# Patient Record
Sex: MALE | Race: WHITE | NOT HISPANIC OR LATINO | Employment: OTHER | URBAN - METROPOLITAN AREA
[De-identification: names, ages, dates, MRNs, and addresses within clinical notes are randomized per-mention and may not be internally consistent; named-entity substitution may affect disease eponyms.]

---

## 2021-01-01 ENCOUNTER — APPOINTMENT (OUTPATIENT)
Dept: RADIATION ONCOLOGY | Facility: MEDICAL CENTER | Age: 76
End: 2021-01-01
Payer: COMMERCIAL

## 2021-01-01 ENCOUNTER — HOSPITAL ENCOUNTER (OUTPATIENT)
Dept: RADIATION ONCOLOGY | Facility: MEDICAL CENTER | Age: 76
End: 2021-11-30
Attending: RADIOLOGY
Payer: COMMERCIAL

## 2021-01-01 ENCOUNTER — TELEPHONE (OUTPATIENT)
Dept: OPHTHALMOLOGY | Facility: MEDICAL CENTER | Age: 76
End: 2021-01-01

## 2021-01-01 ENCOUNTER — HOSPITAL ENCOUNTER (OUTPATIENT)
Dept: RADIOLOGY | Facility: MEDICAL CENTER | Age: 76
End: 2021-12-27
Attending: RADIOLOGY
Payer: COMMERCIAL

## 2021-01-01 ENCOUNTER — TELEPHONE (OUTPATIENT)
Dept: CARDIOLOGY | Facility: MEDICAL CENTER | Age: 76
End: 2021-01-01

## 2021-01-01 ENCOUNTER — APPOINTMENT (OUTPATIENT)
Dept: OPHTHALMOLOGY | Facility: MEDICAL CENTER | Age: 76
End: 2021-01-01
Payer: COMMERCIAL

## 2021-01-01 ENCOUNTER — TELEPHONE (OUTPATIENT)
Dept: RADIATION ONCOLOGY | Facility: MEDICAL CENTER | Age: 76
End: 2021-01-01

## 2021-01-01 VITALS
DIASTOLIC BLOOD PRESSURE: 69 MMHG | OXYGEN SATURATION: 99 % | BODY MASS INDEX: 22.65 KG/M2 | HEART RATE: 55 BPM | SYSTOLIC BLOOD PRESSURE: 133 MMHG | WEIGHT: 167 LBS | TEMPERATURE: 97.8 F

## 2021-01-01 VITALS
WEIGHT: 167.33 LBS | HEART RATE: 61 BPM | SYSTOLIC BLOOD PRESSURE: 116 MMHG | BODY MASS INDEX: 22.69 KG/M2 | DIASTOLIC BLOOD PRESSURE: 67 MMHG | OXYGEN SATURATION: 99 % | TEMPERATURE: 97.4 F

## 2021-01-01 DIAGNOSIS — C25.0 MALIGNANT NEOPLASM OF HEAD OF PANCREAS (HCC): ICD-10-CM

## 2021-01-01 LAB
CHEMOTHERAPY INFUSION START DATE: NORMAL
CHEMOTHERAPY INFUSION STOP DATE: NORMAL
CHEMOTHERAPY RECORDS: 2.1
CHEMOTHERAPY RECORDS: 5880
CHEMOTHERAPY RECORDS: NORMAL
CHEMOTHERAPY RX CANCER: NORMAL
DATE 1ST CHEMO CANCER: NORMAL
RAD ONC ARIA COURSE LAST TREATMENT DATE: NORMAL
RAD ONC ARIA COURSE TREATMENT ELAPSED DAYS: NORMAL
RAD ONC ARIA REFERENCE POINT DOSAGE GIVEN TO DATE: 39.9
RAD ONC ARIA REFERENCE POINT DOSAGE GIVEN TO DATE: 41.26
RAD ONC ARIA REFERENCE POINT DOSAGE GIVEN TO DATE: 42
RAD ONC ARIA REFERENCE POINT DOSAGE GIVEN TO DATE: 43.43
RAD ONC ARIA REFERENCE POINT DOSAGE GIVEN TO DATE: 44.1
RAD ONC ARIA REFERENCE POINT DOSAGE GIVEN TO DATE: 45.6
RAD ONC ARIA REFERENCE POINT DOSAGE GIVEN TO DATE: 46.2
RAD ONC ARIA REFERENCE POINT DOSAGE GIVEN TO DATE: 47.77
RAD ONC ARIA REFERENCE POINT DOSAGE GIVEN TO DATE: 48.3
RAD ONC ARIA REFERENCE POINT DOSAGE GIVEN TO DATE: 49.94
RAD ONC ARIA REFERENCE POINT DOSAGE GIVEN TO DATE: 50.4
RAD ONC ARIA REFERENCE POINT DOSAGE GIVEN TO DATE: 52.11
RAD ONC ARIA REFERENCE POINT DOSAGE GIVEN TO DATE: 52.5
RAD ONC ARIA REFERENCE POINT DOSAGE GIVEN TO DATE: 54.29
RAD ONC ARIA REFERENCE POINT DOSAGE GIVEN TO DATE: 54.6
RAD ONC ARIA REFERENCE POINT DOSAGE GIVEN TO DATE: 56.46
RAD ONC ARIA REFERENCE POINT DOSAGE GIVEN TO DATE: 56.7
RAD ONC ARIA REFERENCE POINT DOSAGE GIVEN TO DATE: 58.63
RAD ONC ARIA REFERENCE POINT DOSAGE GIVEN TO DATE: 58.8
RAD ONC ARIA REFERENCE POINT DOSAGE GIVEN TO DATE: 58.8
RAD ONC ARIA REFERENCE POINT DOSAGE GIVEN TO DATE: 60.8
RAD ONC ARIA REFERENCE POINT DOSAGE GIVEN TO DATE: 60.8
RAD ONC ARIA REFERENCE POINT ID: NORMAL
RAD ONC ARIA REFERENCE POINT SESSION DOSAGE GIVEN: 2.1
RAD ONC ARIA REFERENCE POINT SESSION DOSAGE GIVEN: 2.17

## 2021-01-01 PROCEDURE — 77336 RADIATION PHYSICS CONSULT: CPT | Performed by: RADIOLOGY

## 2021-01-01 PROCEDURE — 74178 CT ABD&PLV WO CNTR FLWD CNTR: CPT

## 2021-01-01 PROCEDURE — 77014 PR CT GUIDANCE PLACEMENT RAD THERAPY FIELDS: CPT | Mod: 26 | Performed by: RADIOLOGY

## 2021-01-01 PROCEDURE — 77386 HCHG IMRT DELIVERY COMPLEX: CPT | Performed by: RADIOLOGY

## 2021-01-01 PROCEDURE — 77427 RADIATION TX MANAGEMENT X5: CPT | Performed by: RADIOLOGY

## 2021-01-01 PROCEDURE — 71260 CT THORAX DX C+: CPT

## 2021-01-01 PROCEDURE — 700117 HCHG RX CONTRAST REV CODE 255: Performed by: RADIOLOGY

## 2021-01-01 PROCEDURE — 700111 HCHG RX REV CODE 636 W/ 250 OVERRIDE (IP): Performed by: RADIOLOGY

## 2021-01-01 RX ORDER — HEPARIN SODIUM (PORCINE) LOCK FLUSH IV SOLN 100 UNIT/ML 100 UNIT/ML
300-500 SOLUTION INTRAVENOUS PRN
Status: DISCONTINUED | OUTPATIENT
Start: 2021-01-01 | End: 2021-01-01 | Stop reason: HOSPADM

## 2021-01-01 RX ADMIN — IOHEXOL 100 ML: 350 INJECTION, SOLUTION INTRAVENOUS at 09:40

## 2021-01-01 RX ADMIN — HEPARIN 500 UNITS: 100 SYRINGE at 09:45

## 2021-01-01 ASSESSMENT — FIBROSIS 4 INDEX
FIB4 SCORE: 4.66
FIB4 SCORE: 4.66

## 2021-01-01 ASSESSMENT — PAIN SCALES - GENERAL
PAINLEVEL: NO PAIN
PAINLEVEL: NO PAIN

## 2021-03-15 ENCOUNTER — HOSPITAL ENCOUNTER (OUTPATIENT)
Dept: RADIOLOGY | Facility: MEDICAL CENTER | Age: 76
End: 2021-03-15
Payer: COMMERCIAL

## 2021-03-19 ENCOUNTER — PRE-ADMISSION TESTING (OUTPATIENT)
Dept: ADMISSIONS | Facility: MEDICAL CENTER | Age: 76
End: 2021-03-19
Attending: INTERNAL MEDICINE
Payer: COMMERCIAL

## 2021-03-19 DIAGNOSIS — Z01.812 PRE-OPERATIVE LABORATORY EXAMINATION: ICD-10-CM

## 2021-03-19 LAB
EST. AVERAGE GLUCOSE BLD GHB EST-MCNC: 217 MG/DL
HBA1C MFR BLD: 9.2 % (ref 4–5.6)

## 2021-03-19 PROCEDURE — C9803 HOPD COVID-19 SPEC COLLECT: HCPCS

## 2021-03-19 PROCEDURE — 36415 COLL VENOUS BLD VENIPUNCTURE: CPT

## 2021-03-19 PROCEDURE — U0003 INFECTIOUS AGENT DETECTION BY NUCLEIC ACID (DNA OR RNA); SEVERE ACUTE RESPIRATORY SYNDROME CORONAVIRUS 2 (SARS-COV-2) (CORONAVIRUS DISEASE [COVID-19]), AMPLIFIED PROBE TECHNIQUE, MAKING USE OF HIGH THROUGHPUT TECHNOLOGIES AS DESCRIBED BY CMS-2020-01-R: HCPCS

## 2021-03-19 PROCEDURE — U0005 INFEC AGEN DETEC AMPLI PROBE: HCPCS

## 2021-03-19 PROCEDURE — 83036 HEMOGLOBIN GLYCOSYLATED A1C: CPT

## 2021-03-19 RX ORDER — AMOXICILLIN AND CLAVULANATE POTASSIUM 875; 125 MG/1; MG/1
1 TABLET, FILM COATED ORAL 2 TIMES DAILY
Status: ON HOLD | COMMUNITY
End: 2021-08-29

## 2021-03-19 RX ORDER — ATORVASTATIN CALCIUM 10 MG/1
10 TABLET, FILM COATED ORAL NIGHTLY
COMMUNITY

## 2021-03-19 RX ORDER — GABAPENTIN 300 MG/1
300 CAPSULE ORAL 3 TIMES DAILY
COMMUNITY

## 2021-03-19 RX ORDER — VALSARTAN 160 MG/1
160 TABLET ORAL DAILY
Status: ON HOLD | COMMUNITY
End: 2022-01-01

## 2021-03-19 RX ORDER — TRAMADOL HYDROCHLORIDE 100 MG/1
100 TABLET, COATED ORAL
COMMUNITY
Start: 2021-03-09 | End: 2021-04-07

## 2021-03-19 RX ORDER — TAMSULOSIN HYDROCHLORIDE 0.4 MG/1
0.8 CAPSULE ORAL DAILY
COMMUNITY

## 2021-03-19 RX ORDER — SULFAMETHOXAZOLE AND TRIMETHOPRIM 800; 160 MG/1; MG/1
1 TABLET ORAL 2 TIMES DAILY
Status: ON HOLD | COMMUNITY
Start: 2021-02-02 | End: 2021-08-29

## 2021-03-19 RX ORDER — PANTOPRAZOLE SODIUM 40 MG/1
40 TABLET, DELAYED RELEASE ORAL
COMMUNITY
Start: 2021-03-09 | End: 2021-04-07

## 2021-03-19 RX ORDER — OXYCODONE HYDROCHLORIDE AND ACETAMINOPHEN 5; 325 MG/1; MG/1
2 TABLET ORAL EVERY 4 HOURS PRN
Status: ON HOLD | COMMUNITY
End: 2022-01-01

## 2021-03-19 NOTE — PREPROCEDURE INSTRUCTIONS
"Pre-admit appointment completed. \"Preparing for your Procedure\" sheet given to Pt with verbal and written instructions. Pt states all instructions given are understood and to call pre-admit or Dr's office for additional questions or any symptoms of illness/covid develop prior to DOS. Self isolation instructions for after the Covid test given to patient. Medications the patient will take the morning of surgery per anesthesia protocol: None and will take prescribed medications through the day before surgery.    Denies anesthesia complications    Had CBC, BMP, and EKG done at [facility] on 3/18/21      "

## 2021-03-20 LAB
SARS-COV-2 RNA RESP QL NAA+PROBE: NOTDETECTED
SPECIMEN SOURCE: NORMAL

## 2021-03-22 NOTE — PROGRESS NOTES
Subjective:   3/30/2021  6:32 AM  Primary care physician:Ric Delgadillo M.D.  Referring Provider: Mulu Faith DO  Medical Oncologist: Massimo De MD     Chief Complaint:   Chief Complaint   Patient presents with   • New Patient     NP PANCREATIC CA REF DR FAITH- EUS samantha for week before, Films from [facility]     Diagnosis:   1. Malignant neoplasm of head of pancreas (HCC)     2. Abnormal CT of the abdomen     3. Abdominal pain, unspecified abdominal location     4. Elevated CA 19-9 level         History of presenting illness:  Zeus Rodríguez  is a pleasant 75 y.o. year old male who presented with what appears to be adenocarcinoma in the head of the pancreas mostly focused on the uncinate and then tracking into the neck.  The patient states back in February he started developing lethargy and abdominal pain in early February.  He also started developing jaundice.  He was seen in the ER seen by medical oncology and had a CT scan.  I have personally reviewed that CT scan from [facility].  It does show a mass in the uncinate process extending up towards the neck.  It does narrow the portal vein SMV confluence as well as abutment on the common hepatic artery.  There is no sign of metastatic disease.  The patient was then sent for a PET scan which I have also personally reviewed from February 22, 2021 which showed some uptake in the pituitary gland but no mass identified.  There is no sign of metastatic disease and there is uptake in the mass in the head of the pancreas.  The patient did have a stent placed by gastroenterology.  He had an endoscopic ultrasound that identified the above findings as well.  The patient's bilirubin is now normalized.  He is not jaundiced.  He does complain of significant fatigue and decreased appetite.  He does have a family history of malignancies including a sister with breast cancer, father with cancer which she is unclear which one it was, and aunts and cousins with cancer.   He has not had any genetic counseling.  His daughter is here with him.  He is not jaundiced.  He states he was a heavy drinker when he was young but that stopped 20 years ago along with smoking.  He was never hospitalized for pancreatitis.  He does have dilated pancreatic duct at this time but he complains of minimal pain.  The patient is anxious to move forward with some type of therapy.  He feels he has been delayed dramatically and is anxious to start some type of therapy.  In light of the present findings, the patient has local advanced disease      Past Medical History:   Diagnosis Date   • Cancer (HCC) 2021    pancreatic   • Cataract     had surgery   • Dental disorder     upper dentures   • Diabetes (HCC)    • Heart burn    • Hemorrhagic disorder (HCC)     nose-bleeds   • High cholesterol    • Hypertension    • Indigestion    • Snoring    • Urinary bladder disorder      Past Surgical History:   Procedure Laterality Date   • PB UPPER GI ENDOSCOPY,DIAGNOSIS  3/24/2021    Procedure: GASTROSCOPY;  Surgeon: Peter Gaines M.D.;  Location: [facility];  Service: EUS   • EGD W/ENDOSCOPIC ULTRASOUND  3/24/2021    Procedure: EGD, WITH ENDOSCOPIC US;  Surgeon: Peter Gaines M.D.;  Location: [facility];  Service: EUS   • EGD WITH ASP/BX  3/24/2021    Procedure: EGD, WITH ASPIRATION BIOPSY;  Surgeon: Peter Gaines M.D.;  Location: [facility];  Service: EUS   • KNEE ARTHROPLASTY TOTAL Right 2018   • CATARACT PHACO WITH IOL Bilateral 2017   • OTHER  2017    L3,4,5 surgery   • CARPAL TUNNEL RELEASE Bilateral 2017   • OTHER  2016    cervical fusion     Allergies   Allergen Reactions   • Morphine Nausea     Outpatient Encounter Medications as of 3/30/2021   Medication Sig Dispense Refill   • ALLOPURINOL PO Take 500 mg by mouth.     • vitamin D (VITAMIND D3) 1000 UNIT Tab Take 1,000 Units by mouth every day.     • cyanocobalamin (VITAMIN B12) 1000 MCG Tab Take  by mouth  every day.     • metFORMIN (GLUCOPHAGE) 500 MG Tab TAKE ONE TABLET BY MOUTH ONCE A DAY WITH A MEAL FOR DIABETES     • oxyCODONE-acetaminophen (PERCOCET) 5-325 MG Tab TAKE 2 TABLETS BY MOUTH EVERY 4 HOURS AS NEEDED FOR PAIN     • pantoprazole (PROTONIX) 40 MG Tablet Delayed Response Take 40 mg by mouth.     • sulfamethoxazole-trimethoprim (BACTRIM DS) 800-160 MG tablet TAKE 1 TABLET BY MOUTH TWICE DAILY FOR 14 DAYS     • tamsulosin (FLOMAX) 0.4 MG capsule TAKE TWO CAPSULES BY MOUTH ONCE A DAY 30 MINUTES AFTER SAME MEAL EACH DAY; FOR PROSTATE ** DO NOT TAKE WITHIN 4 HOURS OF VIAGRA ** (DOSE INCREASE)     • traMADol HCl 100 MG Tab Take 100 mg by mouth.     • atorvastatin (LIPITOR) 10 MG Tab Take 10 mg by mouth every evening.     • gabapentin (NEURONTIN) 300 MG Cap Take 300 mg by mouth 3 times a day.     • valsartan (DIOVAN) 160 MG Tab Take 160 mg by mouth every day.     • FINASTERIDE PO Take  by mouth.     • NON SPECIFIED Vitamin B3     • amoxicillin-clavulanate (AUGMENTIN) 875-125 MG Tab Take 1 tablet by mouth 2 times a day.     • Acetaminophen (TYLENOL 8 HOUR PO) Take  by mouth.       No facility-administered encounter medications on file as of 3/30/2021.     Social History     Socioeconomic History   • Marital status:      Spouse name: Not on file   • Number of children: Not on file   • Years of education: Not on file   • Highest education level: Not on file   Occupational History   • Not on file   Tobacco Use   • Smoking status: Former Smoker   • Smokeless tobacco: Former User   • Tobacco comment: quit 30 years ago   Substance and Sexual Activity   • Alcohol use: Not Currently   • Drug use: Never   • Sexual activity: Not on file   Other Topics Concern   • Not on file   Social History Narrative   • Not on file     Social Determinants of Health     Financial Resource Strain:    • Difficulty of Paying Living Expenses:    Food Insecurity:    • Worried About Running Out of Food in the Last Year:    • Ran Out of  "Food in the Last Year:    Transportation Needs:    • Lack of Transportation (Medical):    • Lack of Transportation (Non-Medical):    Physical Activity:    • Days of Exercise per Week:    • Minutes of Exercise per Session:    Stress:    • Feeling of Stress :    Social Connections:    • Frequency of Communication with Friends and Family:    • Frequency of Social Gatherings with Friends and Family:    • Attends Buddhism Services:    • Active Member of Clubs or Organizations:    • Attends Club or Organization Meetings:    • Marital Status:    Intimate Partner Violence:    • Fear of Current or Ex-Partner:    • Emotionally Abused:    • Physically Abused:    • Sexually Abused:       Social History     Tobacco Use   Smoking Status Former Smoker   Smokeless Tobacco Former User   Tobacco Comment    quit 30 years ago     Social History     Substance and Sexual Activity   Alcohol Use Not Currently     Social History     Substance and Sexual Activity   Drug Use Never        No family history on file.    Review of Systems   Constitutional: Positive for chills, fever, malaise/fatigue and weight loss.   Eyes: Positive for blurred vision.   Gastrointestinal: Positive for abdominal pain, constipation, nausea and vomiting.   Skin: Positive for itching and rash.   Neurological: Positive for dizziness and weakness.   All other systems reviewed and are negative.       Objective:   /74 (BP Location: Right arm, Patient Position: Sitting, BP Cuff Size: Adult)   Pulse (!) 103   Temp (!) 35.6 °C (96.1 °F) (Temporal)   Ht 1.803 m (5' 11\")   Wt 91.2 kg (201 lb)   SpO2 92%   BMI 28.03 kg/m²     Physical Exam    Labs: 2/17/21    Ca 19-9 <38 U/ml 571High      WBC Count 3.7 - 10.6 x10^3/ul 13.4High     RBC Count 4.50 - 5.70 x10^6/ul 3.15Low     HGB 13.0 - 16.7 g/dl 10.9Low     Hct 38.8 - 49.7 % 31.8Low     MCV 83.0 - 99.0 fl 100.8High     MCH 28.0 - 33.8 pg 34.6High     MCHC 33.1 - 36.5 g/dL 34.3    RDW 11.8 - 14.0 % 14.7High   "   Platelet Count 146 - 390 x10^3/uL 205    MPV 6.4 - 10.2 fl 10.1      Sodium 136 - 144 mmol/L 135Low     POTASSIUM 3.6 - 5.1 mmol/L 3.7    Chloride 101 - 111 mmol/L 104    CARBON DIOXIDE 22 - 32 mmol/L 22    Anion Gap 2 - 11 mmol/L 9    Calcium, S/P 8.7 - 10.3 mg/dl 7.7Low     Glucose, Fasting 60 - 99 mg/dl 178High     BUN 8 - 20 mg/dl 10    Creatinine 0.80 - 1.40 mg/dl 0.71Low     Protein, Total 6.4 - 8.2 g/dl 5.1Low     Albumin 3.5 - 4.8 g/dl 2.2Low     ALKALINE PHOSPHATASE 38 - 126 IU/L 172High     ALT 17 - 63 IU/L 56    AST 15 - 41 IU/L 89High     Bilirubin, Total 0.4 - 2.0 mg/dl 1.9    BILIRUBIN, DIRECT 0.0 - 0.3 mg/dl 0.9High     Indirect Bilirubin 0.0 - 1.5 mg/dl 1.0    Globulin 2.6 - 3.1 g/dl 2.9    Albumin/Globulin Ratio 1.00 - 2.20 Ratio 0.80Low     EGFRE Calc >60 ml/min/1.7 108            Imaging: MRI 3/7/21  Per my read,         3/7/21 CT       2/22/21PET         Pathology: 3/8/21     DIAGNOSIS:   A.  Bile duct brushings (cytology):   POSITIVE FOR MALIGNANCY:  Adenocarcinoma present   B.  Pancreatic duct brushings (cytology):   POSITIVE FOR MALIGNANCY:   Adenocarcinoma present     Procedures: 3/24/21    1.  Esophagogastroduodenoscopy with removal of foreign body/pancreatic stent.  2.  Endoscopic ultrasound, upper.    3/8/21  ERCP      Diagnosis:     1. Malignant neoplasm of head of pancreas (HCC)     2. Abnormal CT of the abdomen     3. Abdominal pain, unspecified abdominal location     4. Elevated CA 19-9 level             Medical Decision Making:  Today's Assessment / Status / Plan:     In light of the present findings, the patient suffers from local advanced disease.  There is narrowing there is also involvement of the of the SMV portal vein junction.  Common hepatic artery.  There is no sign of metastatic disease thus the patient should go to neoadjuvant chemotherapy, full cycles of 6-8 followed by repeat imaging.  I had a long discussion with him about the game plan and he has agreed to along with  his daughter.  The patient would benefit from a PowerPort thus we will schedule him for PowerPort.  We discussed the risks and benefits of the PowerPort including bleeding, infection, the possibility of upper extremity thrombosis and not being able to feed the catheter.  He is left-handed but we will approach from the right side.  I have also sent a referral to cancer care specialist as well as Dr. Hadley since the patient is asking for second opinion.  I did stress to him that it would be nice to have his chemotherapy in [place] since it is close to him and that I work very closely with Dr. Jomar Tran and Dr. De.    Thank you  I, Dr. Quintanilla have entered, reviewed and confirmed the above diagnoses related to this patient on this date of service, 3/30/2021  6:32 AM.    He agreed and verbalized his agreement and understanding with the current plan. I answered all questions and concerns he has at this time and advised him to call at any time in the interim with questions or concerns in regards to his care.    Thank you for allowing me to participate in his care, I will continue to follow closely.       Please note that this dictation was created using voice recognition software. I have made every reasonable attempt to correct obvious errors, but I expect that there are errors of grammar and possibly content that I did not discover before finalizing the note.     Thank you for this consultation and allowing me to participate in your patient's care. If I can be of further service please contact my office.

## 2021-03-24 ENCOUNTER — ANESTHESIA (OUTPATIENT)
Dept: SURGERY | Facility: MEDICAL CENTER | Age: 76
End: 2021-03-24
Payer: COMMERCIAL

## 2021-03-24 ENCOUNTER — ANESTHESIA EVENT (OUTPATIENT)
Dept: SURGERY | Facility: MEDICAL CENTER | Age: 76
End: 2021-03-24
Payer: COMMERCIAL

## 2021-03-24 ENCOUNTER — HOSPITAL ENCOUNTER (OUTPATIENT)
Facility: MEDICAL CENTER | Age: 76
End: 2021-03-24
Attending: INTERNAL MEDICINE | Admitting: INTERNAL MEDICINE
Payer: COMMERCIAL

## 2021-03-24 VITALS
HEART RATE: 80 BPM | TEMPERATURE: 97 F | RESPIRATION RATE: 16 BRPM | DIASTOLIC BLOOD PRESSURE: 57 MMHG | BODY MASS INDEX: 29.81 KG/M2 | OXYGEN SATURATION: 95 % | WEIGHT: 212.96 LBS | SYSTOLIC BLOOD PRESSURE: 116 MMHG | HEIGHT: 71 IN

## 2021-03-24 LAB — GLUCOSE BLD-MCNC: 191 MG/DL (ref 65–99)

## 2021-03-24 PROCEDURE — 502240 HCHG MISC OR SUPPLY RC 0272: Performed by: INTERNAL MEDICINE

## 2021-03-24 PROCEDURE — 160025 RECOVERY II MINUTES (STATS): Performed by: INTERNAL MEDICINE

## 2021-03-24 PROCEDURE — 700101 HCHG RX REV CODE 250: Performed by: INTERNAL MEDICINE

## 2021-03-24 PROCEDURE — 160046 HCHG PACU - 1ST 60 MINS PHASE II: Performed by: INTERNAL MEDICINE

## 2021-03-24 PROCEDURE — 160048 HCHG OR STATISTICAL LEVEL 1-5: Performed by: INTERNAL MEDICINE

## 2021-03-24 PROCEDURE — 160002 HCHG RECOVERY MINUTES (STAT): Performed by: INTERNAL MEDICINE

## 2021-03-24 PROCEDURE — 160203 HCHG ENDO MINUTES - 1ST 30 MINS LEVEL 4: Performed by: INTERNAL MEDICINE

## 2021-03-24 PROCEDURE — 700105 HCHG RX REV CODE 258: Performed by: INTERNAL MEDICINE

## 2021-03-24 PROCEDURE — 82962 GLUCOSE BLOOD TEST: CPT

## 2021-03-24 PROCEDURE — 160035 HCHG PACU - 1ST 60 MINS PHASE I: Performed by: INTERNAL MEDICINE

## 2021-03-24 PROCEDURE — 160009 HCHG ANES TIME/MIN: Performed by: INTERNAL MEDICINE

## 2021-03-24 RX ORDER — DIPHENHYDRAMINE HYDROCHLORIDE 50 MG/ML
12.5 INJECTION INTRAMUSCULAR; INTRAVENOUS
Status: DISCONTINUED | OUTPATIENT
Start: 2021-03-24 | End: 2021-03-24 | Stop reason: HOSPADM

## 2021-03-24 RX ORDER — SODIUM CHLORIDE, SODIUM LACTATE, POTASSIUM CHLORIDE, CALCIUM CHLORIDE 600; 310; 30; 20 MG/100ML; MG/100ML; MG/100ML; MG/100ML
INJECTION, SOLUTION INTRAVENOUS CONTINUOUS
Status: DISCONTINUED | OUTPATIENT
Start: 2021-03-24 | End: 2021-03-24 | Stop reason: HOSPADM

## 2021-03-24 RX ORDER — ONDANSETRON 2 MG/ML
4 INJECTION INTRAMUSCULAR; INTRAVENOUS
Status: DISCONTINUED | OUTPATIENT
Start: 2021-03-24 | End: 2021-03-24 | Stop reason: HOSPADM

## 2021-03-24 RX ORDER — HALOPERIDOL 5 MG/ML
1 INJECTION INTRAMUSCULAR
Status: DISCONTINUED | OUTPATIENT
Start: 2021-03-24 | End: 2021-03-24 | Stop reason: HOSPADM

## 2021-03-24 RX ADMIN — SODIUM CHLORIDE, POTASSIUM CHLORIDE, SODIUM LACTATE AND CALCIUM CHLORIDE: 600; 310; 30; 20 INJECTION, SOLUTION INTRAVENOUS at 06:50

## 2021-03-24 RX ADMIN — WATER 15 ML: 100 IRRIGANT IRRIGATION at 06:49

## 2021-03-24 RX ADMIN — SODIUM CHLORIDE, POTASSIUM CHLORIDE, SODIUM LACTATE AND CALCIUM CHLORIDE: 600; 310; 30; 20 INJECTION, SOLUTION INTRAVENOUS at 08:00

## 2021-03-24 RX ADMIN — LIDOCAINE HYDROCHLORIDE 0.5 ML: 10 INJECTION, SOLUTION INFILTRATION; PERINEURAL at 06:50

## 2021-03-24 NOTE — ANESTHESIA PREPROCEDURE EVALUATION
Relevant Problems   No relevant active problems       Physical Exam    Airway   Mallampati: II  TM distance: >3 FB  Neck ROM: full       Cardiovascular - normal exam  Rhythm: regular  Rate: normal  (-) murmur     Dental - normal exam           Pulmonary - normal exam  Breath sounds clear to auscultation     Abdominal    Neurological - normal exam                 Anesthesia Plan    ASA 3   ASA physical status 3 criteria: other (comment)    Plan - general       Airway plan will be natural airway    (Metastatic cancer)      Induction: intravenous    Postoperative Plan: Postoperative administration of opioids is intended.    Pertinent diagnostic labs and testing reviewed    Informed Consent:    Anesthetic plan and risks discussed with patient.    Use of blood products discussed with: patient whom consented to blood products.

## 2021-03-24 NOTE — ANESTHESIA POSTPROCEDURE EVALUATION
Patient: Zeus Rodríguez    Procedure Summary     Date: 03/24/21 Room / Location:  ENDOSCOPIC ULTRASOUND ROOM / SURGERY [facility]    Anesthesia Start: 0800 Anesthesia Stop: 0857    Procedures:       GASTROSCOPY      EGD, WITH ENDOSCOPIC US      EGD, WITH ASPIRATION BIOPSY Diagnosis:       Mass of pancreas      (MASS OF PANCREAS)    Surgeons: Peter Gaines M.D. Responsible Provider: Yakov Lara M.D.    Anesthesia Type: general ASA Status: 2          Final Anesthesia Type: general  Last vitals  BP   Blood Pressure : 112/67    Temp   37.1 °C (98.8 °F)    Pulse   77   Resp   14    SpO2   100 %      Anesthesia Post Evaluation    Patient location during evaluation: PACU  Patient participation: complete - patient participated  Level of consciousness: awake and alert    Airway patency: patent  Anesthetic complications: no  Cardiovascular status: hemodynamically stable  Respiratory status: acceptable  Hydration status: euvolemic    PONV: none          There were no known complications for this encounter.     Nurse Pain Score: 0 (NPRS)

## 2021-03-24 NOTE — OR NURSING
0832: To PACU post EGD/EUS. EGD bite block in place.  0841: Bite block dc'd, breathing is spontaneous and unlabored.  0900: Pt on room air. No pain or nausea.  0925: Meets criteria for stage ll.

## 2021-03-24 NOTE — ANESTHESIA TIME REPORT
Anesthesia Start and Stop Event Times     Date Time Event    3/24/2021 0800 Anesthesia Start     0811 Ready for Procedure     0850 Anesthesia Stop        Responsible Staff  03/24/21    Name Role Begin End    Yakov Lara M.D. Anesth 0800 0850        Preop Diagnosis (Free Text):  Pre-op Diagnosis     MASS OF PANCREAS, OBSTRUCTIVE HYPERBILIRUBINEMIA        Preop Diagnosis (Codes):  Diagnosis Information     Diagnosis Code(s): Mass of pancreas [K86.89]        Post op Diagnosis  Pancreatic cancer (HCC)      Premium Reason  Non-Premium    Comments:

## 2021-03-24 NOTE — DISCHARGE INSTRUCTIONS
ENDOSCOPY HOME CARE INSTRUCTION    GASTROSCOPY OR ERCP  1. Don't eat or drink anything for about an hour after the test. You can then resume your regular diet.  2. Don't drive or drink alcohol for 24 hours. The medication you received will make you too drowsy.    4. If you begin to vomit bloody material, or develop black or bloody stools, call your doctor as soon as possible.  5. If you have any neck, chest, abdominal pain or temp of 100 degrees, call your doctor.  6. See your doctor ***  7. Additional instructions: ***  8. Prescriptions: ***  Resume pre-procedural diet    Dr. Gaines  [facility]  [address]  [phone]     EGD with:        Pancreas stent / foreign body removal     EUS upper     Indication:        Pancreas cancer staging     Sedation:         LAZ (Marco)     Findings:              EGD                          Esophagus                                      Unremarkable                          Stomach                                      Hiatal hernia - 42-44cm                                      Large partially digested gastric pool                                      Grossly unremarkable mucosa                          Duodenum                                      Unremarkable mucosa                                      Biliary and pancreatic stents in place                                      Pancreatic stent removed with rat-tooth forcep                 EUS                          Celiac axis                                      No adenopathy                                      Clear plane between tumor and celiac and superior mesenteric arteries                          Pancreas body/tail                                      Atrophic, heterogeneous, hypoechoic                          Pancreatic duct                                      Dilated and tortuous to tail of pancreas                          Ampulla                                      Bile duct stent in place                           CBD                                      Encased by tumor                          Head of pancreas tumor                                      29.0mm x 25.6mm x 27.3mm                                      Hypoechoic, heterogeneous                                      Abutting portal vein with loss of interface but no invasion into lumen    Plan:                Follow up with Dr. Quintanilla                          Favor neoadjuvant therapy prior to surgery        3/24/2021 8:31 AM Peter Gaines M.D.    You should call 911 if you develop problems with breathing or chest pain.  If any questions arise, call your doctor. If your doctor is not available, please feel free to call [phone]. You can also call the HEALTH HOTLINE open 24 hours/day, 7 days/week and speak to a nurse at [phone], or toll free [phone].    Depression / Suicide Risk    As you are discharged from this [facility], it is important to learn how to keep safe from harming yourself.    Recognize the warning signs:  · Abrupt changes in personality, positive or negative- including increase in energy   · Giving away possessions  · Change in eating patterns- significant weight changes-  positive or negative  · Change in sleeping patterns- unable to sleep or sleeping all the time   · Unwillingness or inability to communicate  · Depression  · Unusual sadness, discouragement and loneliness  · Talk of wanting to die  · Neglect of personal appearance   · Rebelliousness- reckless behavior  · Withdrawal from people/activities they love  · Confusion- inability to concentrate     If you or a loved one observes any of these behaviors or has concerns about self-harm, here's what you can do:  · Talk about it- your feelings and reasons for harming yourself  · Remove any means that you might use to hurt yourself (examples: pills, rope, extension cords, firearm)  · Get professional help from the community (Mental Health,  Substance Abuse, psychological counseling)  · Do not be alone:Call your Safe Contact- someone whom you trust who will be there for you.  · Call your local CRISIS HOTLINE 889-4842 or 531-188-1263  · Call your local Children's Mobile Crisis Response Team [place] (423) 067-1568 or www.[employer]  · Call the toll free National Suicide Prevention Hotlines   · National Suicide Prevention Lifeline 370-180-UWAQ (9097)  · National Hope Line Network 800-SUICIDE (727-0829)    I acknowledge receipt and understanding of these Home Care Instructions.

## 2021-03-24 NOTE — OR SURGEON
Immediate Post OP Note    PreOp Diagnosis: Pancreatic cancer    PostOp Diagnosis: Same    Procedure(s):  GASTROSCOPY - Wound Class: Clean Contaminated  EGD, WITH ENDOSCOPIC US - Wound Class: Clean Contaminated  EGD, WITH ASPIRATION BIOPSY - Wound Class: Clean Contaminated    Surgeon(s):  Peter Gaines M.D.    Anesthesiologist/Type of Anesthesia:  Anesthesiologist: Yakov Lara M.D./* No anesthesia type entered *    Surgical Staff:  Circulator: Gerardo Larios R.N.  Endoscopy Technician: Sheryl York; Vik Mitchell    Specimens removed if any:  * No specimens in log *    Dr. Gaines  [facility]  [place] [address]  [phone]    EGD with: Pancreas stent / foreign body removal    EUS upper    Indication: Pancreas cancer staging    Sedation: MAC (Marco)    Findings:   EGD    Esophagus     Unremarkable    Stomach     Hiatal hernia - 42-44cm     Large partially digested gastric pool     Grossly unremarkable mucosa    Duodenum     Unremarkable mucosa     Biliary and pancreatic stents in place     Pancreatic stent removed with rat-tooth forcep     EUS    Celiac axis     No adenopathy     Clear plane between tumor and celiac and superior mesenteric arteries    Pancreas body/tail     Atrophic, heterogeneous, hypoechoic    Pancreatic duct     Dilated and tortuous to tail of pancreas    Ampulla     Bile duct stent in place    CBD     Encased by tumor    Head of pancreas tumor     29.0mm x 25.6mm x 27.3mm     Hypoechoic, heterogeneous     Abutting portal vein with loss of interface but no invasion into lumen    Plan:  Follow up with Dr. Quintanilla    Favor neoadjuvant therapy prior to surgery      3/24/2021 8:31 AM Peter Gaines M.D.

## 2021-03-24 NOTE — PROCEDURES
DATE OF PROCEDURE:  03/24/2021     PROCEDURES:  1.  Esophagogastroduodenoscopy with removal of foreign body/pancreatic stent.  2.  Endoscopic ultrasound, upper.     INDICATION:  Pancreatic cancer staging.     FINAL IMPRESSION:  EGD FINDINGS:  1.  Esophagus, unremarkable mucosa.  2.  A 2 cm hiatal hernia.  3.  Large partially digested gastric pool limiting visualization.  4.  Grossly unremarkable gastric mucosa.  5.  Duodenum, unremarkable mucosa throughout.  6.  Biliary and pancreatic stents in place.  7.  Pancreatic stent removed with rat tooth forceps.     ENDOSCOPIC ULTRASOUND FINDINGS:  1.  Celiac axis, no adenopathy.  2.  Clear plane seen between pancreatic tumor and celiac and superior   mesenteric arteries.  3.  Pancreatic body and tail, atrophic heterogeneous and hypoechoic.  4.  Pancreatic duct dilated and tortuous through to the tail.  5.  Biliary ampulla bile duct stent in place.  6.  Common bile duct stent in place.  Encased by tumor in the head of the   pancreas.  7.  Head of pancreas tumor 29.0 mm x 25.6 mm x 27.3 mm.  Hypoechoic   heterogeneous.  Abutting portal vein with loss of interface, but no invasion   into the lumen.     RECOMMENDATIONS:  1.  Follow up with Dr. Quintanilla.  2.  Favor neoadjuvant therapy prior to surgery.     DESCRIPTION OF PROCEDURE:  Prior to the procedure, physical exam was stable.    During procedure, vital signs remained within normal limits.  Prior to   sedation, informed consent was obtained.  Risks, benefits, alternatives   including but not limited to risk of bleeding, infection, perforation, adverse   reaction to medication, failure to identify pathology, pancreatitis and death   explained to the patient and his daughter who accepted all risks.  The   patient was prepped in the left lateral position after sedation provided by   anesthesia in the form of propofol.     EGD:  Scope tip of the Olympus flexible gastroscope passed in the proximal   esophagus.  Proximal  middle and distal thirds of the esophagus were well   visualized and were unremarkable.  Stomach was entered.  There was a hiatal   hernia from 42-44 cm.  Air was insufflated.  There was a large partially   digested gastric pool, which had minimal liquid component and could not be   suctioned.  Air was insufflated, scope retroflexed to view the body, fundus   and cardia, then straightened to view the antrum and incisura.  The mucosa was   grossly unremarkable, but could not be fully assessed given the food in the   stomach.  The pylorus was patent.  Duodenal bulb sweep second and third   portion demonstrated unremarkable parenchyma.  The biliary ampulla had a   biliary and pancreatic stent in place.  A rat tooth forceps was passed down   the scope channel and the pancreatic stent was selectively removed through the   scope channel leaving the biliary stent in place.  Once this was complete,   the scope was withdrawn.  Air was suctioned from the stomach and we proceeded   with endoscopic ultrasound.     EUS:  The flexible radial echoendoscope passed in the proximal stomach.    Imaging of the celiac axis showed no adenopathy.  There was a clear plane seen   between tumor and celiac and superior mesenteric arteries arguing against   invasion.  The pancreatic body and tail itself was heterogeneous hypoechoic   and atrophic consistent with downstream tumor.  The pancreatic duct was   tortuous and dilated through to the tail of the pancreas.  Scope was advanced   into the duodenum.  The biliary ampulla was obscured by the bile duct stent.    The common bile duct was followed and was seen to be encased by tumor.  In the   head of the pancreas, there was a 29.0 x 27.3 x 25.6 mm heterogeneous   hypoechoic tumor consistent with the patient's known adenocarcinoma.  This was   followed and the portal vein was identified.  There was a loss of interface   between the tumor and the portal vein suggestive of tumor adherence at  least,   if not ingrowth.  There was no invasion into the lumen.  Multiple passes with   the scope were made in a similar fashion with the same findings.  Procedure   was deemed complete at this time.  Scope was withdrawn.  Air and liquid were   suctioned.  The patient tolerated the procedure well and was sent to recovery   without immediate complications.        ______________________________  MD MELANIE CARRERA/JEREMIAH/LEO    DD:  03/24/2021 08:50  DT:  03/24/2021 10:13    Job#:  [id number]

## 2021-03-30 ENCOUNTER — OFFICE VISIT (OUTPATIENT)
Dept: SURGICAL ONCOLOGY | Facility: MEDICAL CENTER | Age: 76
End: 2021-03-30
Payer: COMMERCIAL

## 2021-03-30 VITALS
BODY MASS INDEX: 28.14 KG/M2 | DIASTOLIC BLOOD PRESSURE: 74 MMHG | HEART RATE: 103 BPM | OXYGEN SATURATION: 92 % | WEIGHT: 201 LBS | SYSTOLIC BLOOD PRESSURE: 128 MMHG | TEMPERATURE: 96.1 F | HEIGHT: 71 IN

## 2021-03-30 DIAGNOSIS — C25.0 MALIGNANT NEOPLASM OF HEAD OF PANCREAS (HCC): ICD-10-CM

## 2021-03-30 DIAGNOSIS — R97.8 ELEVATED CA 19-9 LEVEL: ICD-10-CM

## 2021-03-30 DIAGNOSIS — R93.5 ABNORMAL CT OF THE ABDOMEN: ICD-10-CM

## 2021-03-30 DIAGNOSIS — R10.9 ABDOMINAL PAIN, UNSPECIFIED ABDOMINAL LOCATION: ICD-10-CM

## 2021-03-30 PROCEDURE — 99205 OFFICE O/P NEW HI 60 MIN: CPT | Performed by: SURGERY

## 2021-03-30 ASSESSMENT — ENCOUNTER SYMPTOMS
WEAKNESS: 1
WEIGHT LOSS: 1
FEVER: 1
DIZZINESS: 1
ABDOMINAL PAIN: 1
BLURRED VISION: 1
NAUSEA: 1
CONSTIPATION: 1
CHILLS: 1
VOMITING: 1

## 2021-03-30 NOTE — PATIENT INSTRUCTIONS
The patient be scheduled for [employer] and also being referred to cancer care specialist but also I have put a message out to Dr. Hadley in [place] since the patient prefers another opinion.

## 2021-03-31 ENCOUNTER — HOSPITAL ENCOUNTER (OUTPATIENT)
Facility: MEDICAL CENTER | Age: 76
End: 2021-03-31
Attending: SURGERY | Admitting: SURGERY
Payer: COMMERCIAL

## 2021-04-02 ENCOUNTER — PRE-ADMISSION TESTING (OUTPATIENT)
Dept: ADMISSIONS | Facility: MEDICAL CENTER | Age: 76
End: 2021-04-02
Attending: SURGERY
Payer: COMMERCIAL

## 2021-04-02 DIAGNOSIS — Z01.812 PRE-OPERATIVE LABORATORY EXAMINATION: ICD-10-CM

## 2021-04-02 LAB
COVID ORDER STATUS COVID19: NORMAL
SARS-COV-2 RNA RESP QL NAA+PROBE: NOTDETECTED
SPECIMEN SOURCE: NORMAL

## 2021-04-02 PROCEDURE — U0003 INFECTIOUS AGENT DETECTION BY NUCLEIC ACID (DNA OR RNA); SEVERE ACUTE RESPIRATORY SYNDROME CORONAVIRUS 2 (SARS-COV-2) (CORONAVIRUS DISEASE [COVID-19]), AMPLIFIED PROBE TECHNIQUE, MAKING USE OF HIGH THROUGHPUT TECHNOLOGIES AS DESCRIBED BY CMS-2020-01-R: HCPCS

## 2021-04-02 PROCEDURE — C9803 HOPD COVID-19 SPEC COLLECT: HCPCS

## 2021-04-02 PROCEDURE — U0005 INFEC AGEN DETEC AMPLI PROBE: HCPCS

## 2021-04-07 ENCOUNTER — HOSPITAL ENCOUNTER (OUTPATIENT)
Facility: MEDICAL CENTER | Age: 76
End: 2021-04-09
Attending: EMERGENCY MEDICINE | Admitting: STUDENT IN AN ORGANIZED HEALTH CARE EDUCATION/TRAINING PROGRAM
Payer: COMMERCIAL

## 2021-04-07 DIAGNOSIS — R17 JAUNDICE: ICD-10-CM

## 2021-04-07 PROBLEM — E11.9 DM (DIABETES MELLITUS) (HCC): Status: ACTIVE | Noted: 2021-04-07

## 2021-04-07 LAB
ALBUMIN SERPL BCP-MCNC: 3.2 G/DL (ref 3.2–4.9)
ALBUMIN/GLOB SERPL: 0.9 G/DL
ALP SERPL-CCNC: 431 U/L (ref 30–99)
ALT SERPL-CCNC: 69 U/L (ref 2–50)
ANION GAP SERPL CALC-SCNC: 11 MMOL/L (ref 7–16)
AST SERPL-CCNC: 129 U/L (ref 12–45)
BASOPHILS # BLD AUTO: 0.6 % (ref 0–1.8)
BASOPHILS # BLD: 0.04 K/UL (ref 0–0.12)
BILIRUB SERPL-MCNC: 6.3 MG/DL (ref 0.1–1.5)
BUN SERPL-MCNC: 7 MG/DL (ref 8–22)
CALCIUM SERPL-MCNC: 9 MG/DL (ref 8.5–10.5)
CHLORIDE SERPL-SCNC: 99 MMOL/L (ref 96–112)
CO2 SERPL-SCNC: 21 MMOL/L (ref 20–33)
CREAT SERPL-MCNC: 0.3 MG/DL (ref 0.5–1.4)
EOSINOPHIL # BLD AUTO: 0.07 K/UL (ref 0–0.51)
EOSINOPHIL NFR BLD: 1.1 % (ref 0–6.9)
ERYTHROCYTE [DISTWIDTH] IN BLOOD BY AUTOMATED COUNT: 54 FL (ref 35.9–50)
GLOBULIN SER CALC-MCNC: 3.5 G/DL (ref 1.9–3.5)
GLUCOSE SERPL-MCNC: 224 MG/DL (ref 65–99)
HCT VFR BLD AUTO: 36.2 % (ref 42–52)
HGB BLD-MCNC: 12.2 G/DL (ref 14–18)
IMM GRANULOCYTES # BLD AUTO: 0.08 K/UL (ref 0–0.11)
IMM GRANULOCYTES NFR BLD AUTO: 1.2 % (ref 0–0.9)
LYMPHOCYTES # BLD AUTO: 2.07 K/UL (ref 1–4.8)
LYMPHOCYTES NFR BLD: 32 % (ref 22–41)
MCH RBC QN AUTO: 33.3 PG (ref 27–33)
MCHC RBC AUTO-ENTMCNC: 33.7 G/DL (ref 33.7–35.3)
MCV RBC AUTO: 98.9 FL (ref 81.4–97.8)
MONOCYTES # BLD AUTO: 0.63 K/UL (ref 0–0.85)
MONOCYTES NFR BLD AUTO: 9.7 % (ref 0–13.4)
NEUTROPHILS # BLD AUTO: 3.58 K/UL (ref 1.82–7.42)
NEUTROPHILS NFR BLD: 55.4 % (ref 44–72)
NRBC # BLD AUTO: 0 K/UL
NRBC BLD-RTO: 0 /100 WBC
PLATELET # BLD AUTO: 256 K/UL (ref 164–446)
PMV BLD AUTO: 12.2 FL (ref 9–12.9)
POTASSIUM SERPL-SCNC: 3.7 MMOL/L (ref 3.6–5.5)
PROT SERPL-MCNC: 6.7 G/DL (ref 6–8.2)
RBC # BLD AUTO: 3.66 M/UL (ref 4.7–6.1)
SODIUM SERPL-SCNC: 131 MMOL/L (ref 135–145)
WBC # BLD AUTO: 6.5 K/UL (ref 4.8–10.8)

## 2021-04-07 PROCEDURE — G0378 HOSPITAL OBSERVATION PER HR: HCPCS

## 2021-04-07 PROCEDURE — 85025 COMPLETE CBC W/AUTO DIFF WBC: CPT

## 2021-04-07 PROCEDURE — 36415 COLL VENOUS BLD VENIPUNCTURE: CPT

## 2021-04-07 PROCEDURE — 80053 COMPREHEN METABOLIC PANEL: CPT

## 2021-04-07 PROCEDURE — 99285 EMERGENCY DEPT VISIT HI MDM: CPT

## 2021-04-07 PROCEDURE — 94760 N-INVAS EAR/PLS OXIMETRY 1: CPT

## 2021-04-07 PROCEDURE — 99219 PR INITIAL OBSERVATION CARE,LEVL II: CPT | Performed by: STUDENT IN AN ORGANIZED HEALTH CARE EDUCATION/TRAINING PROGRAM

## 2021-04-07 PROCEDURE — C9803 HOPD COVID-19 SPEC COLLECT: HCPCS | Performed by: EMERGENCY MEDICINE

## 2021-04-07 RX ORDER — HEPARIN SODIUM 5000 [USP'U]/ML
5000 INJECTION, SOLUTION INTRAVENOUS; SUBCUTANEOUS EVERY 8 HOURS
Status: DISCONTINUED | OUTPATIENT
Start: 2021-04-07 | End: 2021-04-07

## 2021-04-07 RX ORDER — TRAMADOL HYDROCHLORIDE 50 MG/1
100 TABLET ORAL EVERY 6 HOURS PRN
Status: DISCONTINUED | OUTPATIENT
Start: 2021-04-07 | End: 2021-04-09 | Stop reason: HOSPADM

## 2021-04-07 RX ORDER — OMEPRAZOLE 40 MG/1
40 CAPSULE, DELAYED RELEASE ORAL DAILY
Status: ON HOLD | COMMUNITY
End: 2022-01-01

## 2021-04-07 RX ORDER — ONDANSETRON 4 MG/1
4 TABLET, ORALLY DISINTEGRATING ORAL EVERY 4 HOURS PRN
Status: DISCONTINUED | OUTPATIENT
Start: 2021-04-07 | End: 2021-04-09 | Stop reason: HOSPADM

## 2021-04-07 RX ORDER — POLYETHYLENE GLYCOL 3350 17 G/17G
1 POWDER, FOR SOLUTION ORAL
Status: DISCONTINUED | OUTPATIENT
Start: 2021-04-07 | End: 2021-04-09 | Stop reason: HOSPADM

## 2021-04-07 RX ORDER — GABAPENTIN 300 MG/1
300 CAPSULE ORAL 3 TIMES DAILY
Status: DISCONTINUED | OUTPATIENT
Start: 2021-04-08 | End: 2021-04-09 | Stop reason: HOSPADM

## 2021-04-07 RX ORDER — ATORVASTATIN CALCIUM 10 MG/1
10 TABLET, FILM COATED ORAL NIGHTLY
Status: DISCONTINUED | OUTPATIENT
Start: 2021-04-07 | End: 2021-04-09 | Stop reason: HOSPADM

## 2021-04-07 RX ORDER — LABETALOL HYDROCHLORIDE 5 MG/ML
10 INJECTION, SOLUTION INTRAVENOUS EVERY 4 HOURS PRN
Status: DISCONTINUED | OUTPATIENT
Start: 2021-04-07 | End: 2021-04-09 | Stop reason: HOSPADM

## 2021-04-07 RX ORDER — SODIUM CHLORIDE 9 MG/ML
INJECTION, SOLUTION INTRAVENOUS CONTINUOUS
Status: ACTIVE | OUTPATIENT
Start: 2021-04-07 | End: 2021-04-08

## 2021-04-07 RX ORDER — AMOXICILLIN 250 MG
2 CAPSULE ORAL 2 TIMES DAILY
Status: DISCONTINUED | OUTPATIENT
Start: 2021-04-08 | End: 2021-04-09 | Stop reason: HOSPADM

## 2021-04-07 RX ORDER — OMEPRAZOLE 20 MG/1
20 CAPSULE, DELAYED RELEASE ORAL DAILY
Status: DISCONTINUED | OUTPATIENT
Start: 2021-04-08 | End: 2021-04-08

## 2021-04-07 RX ORDER — BISACODYL 10 MG
10 SUPPOSITORY, RECTAL RECTAL
Status: DISCONTINUED | OUTPATIENT
Start: 2021-04-07 | End: 2021-04-09 | Stop reason: HOSPADM

## 2021-04-07 RX ORDER — ONDANSETRON 2 MG/ML
4 INJECTION INTRAMUSCULAR; INTRAVENOUS EVERY 4 HOURS PRN
Status: DISCONTINUED | OUTPATIENT
Start: 2021-04-07 | End: 2021-04-09 | Stop reason: HOSPADM

## 2021-04-07 RX ORDER — VALSARTAN 80 MG/1
160 TABLET ORAL DAILY
Status: DISCONTINUED | OUTPATIENT
Start: 2021-04-08 | End: 2021-04-09 | Stop reason: HOSPADM

## 2021-04-07 RX ORDER — OXYCODONE HYDROCHLORIDE AND ACETAMINOPHEN 5; 325 MG/1; MG/1
1-2 TABLET ORAL EVERY 4 HOURS PRN
Status: DISCONTINUED | OUTPATIENT
Start: 2021-04-07 | End: 2021-04-09 | Stop reason: HOSPADM

## 2021-04-07 RX ORDER — FINASTERIDE 5 MG/1
5 TABLET, FILM COATED ORAL DAILY
Status: DISCONTINUED | OUTPATIENT
Start: 2021-04-08 | End: 2021-04-09 | Stop reason: HOSPADM

## 2021-04-07 RX ORDER — METRONIDAZOLE 500 MG/1
500 TABLET ORAL ONCE
Status: COMPLETED | OUTPATIENT
Start: 2021-04-07 | End: 2021-04-08

## 2021-04-07 RX ORDER — DEXTROSE MONOHYDRATE 25 G/50ML
50 INJECTION, SOLUTION INTRAVENOUS
Status: DISCONTINUED | OUTPATIENT
Start: 2021-04-07 | End: 2021-04-09 | Stop reason: HOSPADM

## 2021-04-07 RX ORDER — ACETAMINOPHEN 325 MG/1
650 TABLET ORAL EVERY 6 HOURS PRN
Status: DISCONTINUED | OUTPATIENT
Start: 2021-04-07 | End: 2021-04-09 | Stop reason: HOSPADM

## 2021-04-07 RX ORDER — TAMSULOSIN HYDROCHLORIDE 0.4 MG/1
0.4 CAPSULE ORAL DAILY
Status: DISCONTINUED | OUTPATIENT
Start: 2021-04-08 | End: 2021-04-09 | Stop reason: HOSPADM

## 2021-04-07 ASSESSMENT — ENCOUNTER SYMPTOMS
DIARRHEA: 0
CONSTIPATION: 0

## 2021-04-07 NOTE — ED TRIAGE NOTES
Pt ambulated to triage with c/o   Chief Complaint   Patient presents with   • Sent by MD     feb dx pancreatic CA, admitted 4 wks ago and stents placed, starting to become jaundice, supposed to have port place, sent for possible ERCP today       Pt reports pain 3/10.      Pt Informed regarding triage process and verbalized understanding to inform triage tech or RN for any changes in condition. Placed in family room.

## 2021-04-08 ENCOUNTER — HOSPITAL ENCOUNTER (OUTPATIENT)
Facility: MEDICAL CENTER | Age: 76
End: 2021-04-08
Attending: INTERNAL MEDICINE | Admitting: INTERNAL MEDICINE
Payer: COMMERCIAL

## 2021-04-08 PROBLEM — N40.0 BPH (BENIGN PROSTATIC HYPERPLASIA): Status: ACTIVE | Noted: 2021-04-08

## 2021-04-08 PROBLEM — D69.9 HEMORRHAGIC DISORDER (HCC): Status: ACTIVE | Noted: 2021-04-08

## 2021-04-08 PROBLEM — I10 HYPERTENSION: Status: ACTIVE | Noted: 2021-04-08

## 2021-04-08 LAB
ALBUMIN SERPL BCP-MCNC: 3.1 G/DL (ref 3.2–4.9)
ALBUMIN/GLOB SERPL: 1 G/DL
ALP SERPL-CCNC: 411 U/L (ref 30–99)
ALT SERPL-CCNC: 61 U/L (ref 2–50)
ANION GAP SERPL CALC-SCNC: 11 MMOL/L (ref 7–16)
AST SERPL-CCNC: 114 U/L (ref 12–45)
BILIRUB SERPL-MCNC: 5.8 MG/DL (ref 0.1–1.5)
BUN SERPL-MCNC: 6 MG/DL (ref 8–22)
CALCIUM SERPL-MCNC: 8.7 MG/DL (ref 8.5–10.5)
CHLORIDE SERPL-SCNC: 100 MMOL/L (ref 96–112)
CHOLEST SERPL-MCNC: 145 MG/DL (ref 100–199)
CO2 SERPL-SCNC: 23 MMOL/L (ref 20–33)
CREAT SERPL-MCNC: 0.3 MG/DL (ref 0.5–1.4)
ERYTHROCYTE [DISTWIDTH] IN BLOOD BY AUTOMATED COUNT: 55.4 FL (ref 35.9–50)
EST. AVERAGE GLUCOSE BLD GHB EST-MCNC: 209 MG/DL
GLOBULIN SER CALC-MCNC: 3.2 G/DL (ref 1.9–3.5)
GLUCOSE BLD-MCNC: 153 MG/DL (ref 65–99)
GLUCOSE BLD-MCNC: 193 MG/DL (ref 65–99)
GLUCOSE BLD-MCNC: 207 MG/DL (ref 65–99)
GLUCOSE BLD-MCNC: 217 MG/DL (ref 65–99)
GLUCOSE BLD-MCNC: 253 MG/DL (ref 65–99)
GLUCOSE SERPL-MCNC: 211 MG/DL (ref 65–99)
HBA1C MFR BLD: 8.9 % (ref 4–5.6)
HCT VFR BLD AUTO: 34.9 % (ref 42–52)
HDLC SERPL-MCNC: 17 MG/DL
HGB BLD-MCNC: 11.8 G/DL (ref 14–18)
LDLC SERPL CALC-MCNC: 96 MG/DL
MAGNESIUM SERPL-MCNC: 1.9 MG/DL (ref 1.5–2.5)
MCH RBC QN AUTO: 33.2 PG (ref 27–33)
MCHC RBC AUTO-ENTMCNC: 33.8 G/DL (ref 33.7–35.3)
MCV RBC AUTO: 98.3 FL (ref 81.4–97.8)
PLATELET # BLD AUTO: 247 K/UL (ref 164–446)
PMV BLD AUTO: 12 FL (ref 9–12.9)
POTASSIUM SERPL-SCNC: 3.6 MMOL/L (ref 3.6–5.5)
PROT SERPL-MCNC: 6.3 G/DL (ref 6–8.2)
RBC # BLD AUTO: 3.55 M/UL (ref 4.7–6.1)
SODIUM SERPL-SCNC: 134 MMOL/L (ref 135–145)
TRIGL SERPL-MCNC: 158 MG/DL (ref 0–149)
WBC # BLD AUTO: 6.4 K/UL (ref 4.8–10.8)

## 2021-04-08 PROCEDURE — 82962 GLUCOSE BLOOD TEST: CPT

## 2021-04-08 PROCEDURE — 700111 HCHG RX REV CODE 636 W/ 250 OVERRIDE (IP): Performed by: STUDENT IN AN ORGANIZED HEALTH CARE EDUCATION/TRAINING PROGRAM

## 2021-04-08 PROCEDURE — 85027 COMPLETE CBC AUTOMATED: CPT

## 2021-04-08 PROCEDURE — 99226 PR SUBSEQUENT OBSERVATION CARE,LEVEL III: CPT | Performed by: HOSPITALIST

## 2021-04-08 PROCEDURE — 80053 COMPREHEN METABOLIC PANEL: CPT

## 2021-04-08 PROCEDURE — G0378 HOSPITAL OBSERVATION PER HR: HCPCS

## 2021-04-08 PROCEDURE — 80061 LIPID PANEL: CPT

## 2021-04-08 PROCEDURE — 700102 HCHG RX REV CODE 250 W/ 637 OVERRIDE(OP): Performed by: STUDENT IN AN ORGANIZED HEALTH CARE EDUCATION/TRAINING PROGRAM

## 2021-04-08 PROCEDURE — 83036 HEMOGLOBIN GLYCOSYLATED A1C: CPT

## 2021-04-08 PROCEDURE — 83735 ASSAY OF MAGNESIUM: CPT

## 2021-04-08 PROCEDURE — 700105 HCHG RX REV CODE 258: Performed by: STUDENT IN AN ORGANIZED HEALTH CARE EDUCATION/TRAINING PROGRAM

## 2021-04-08 PROCEDURE — 96365 THER/PROPH/DIAG IV INF INIT: CPT

## 2021-04-08 PROCEDURE — 96372 THER/PROPH/DIAG INJ SC/IM: CPT | Mod: XU

## 2021-04-08 PROCEDURE — A9270 NON-COVERED ITEM OR SERVICE: HCPCS | Performed by: STUDENT IN AN ORGANIZED HEALTH CARE EDUCATION/TRAINING PROGRAM

## 2021-04-08 RX ORDER — OMEPRAZOLE 20 MG/1
40 CAPSULE, DELAYED RELEASE ORAL DAILY
Status: DISCONTINUED | OUTPATIENT
Start: 2021-04-08 | End: 2021-04-09 | Stop reason: HOSPADM

## 2021-04-08 RX ADMIN — CEFTRIAXONE SODIUM 1 G: 1 INJECTION, POWDER, FOR SOLUTION INTRAMUSCULAR; INTRAVENOUS at 01:39

## 2021-04-08 RX ADMIN — METRONIDAZOLE 500 MG: 500 TABLET ORAL at 01:39

## 2021-04-08 RX ADMIN — ATORVASTATIN CALCIUM 10 MG: 10 TABLET, FILM COATED ORAL at 20:29

## 2021-04-08 RX ADMIN — OMEPRAZOLE 40 MG: 20 CAPSULE, DELAYED RELEASE ORAL at 05:32

## 2021-04-08 RX ADMIN — GABAPENTIN 300 MG: 300 CAPSULE ORAL at 12:38

## 2021-04-08 RX ADMIN — INSULIN HUMAN 5 UNITS: 100 INJECTION, SOLUTION PARENTERAL at 23:21

## 2021-04-08 RX ADMIN — TAMSULOSIN HYDROCHLORIDE 0.4 MG: 0.4 CAPSULE ORAL at 05:33

## 2021-04-08 RX ADMIN — ATORVASTATIN CALCIUM 10 MG: 10 TABLET, FILM COATED ORAL at 01:39

## 2021-04-08 RX ADMIN — INSULIN HUMAN 3 UNITS: 100 INJECTION, SOLUTION PARENTERAL at 01:37

## 2021-04-08 RX ADMIN — SODIUM CHLORIDE: 9 INJECTION, SOLUTION INTRAVENOUS at 00:16

## 2021-04-08 RX ADMIN — INSULIN HUMAN 2 UNITS: 100 INJECTION, SOLUTION PARENTERAL at 05:40

## 2021-04-08 RX ADMIN — GABAPENTIN 300 MG: 300 CAPSULE ORAL at 18:02

## 2021-04-08 RX ADMIN — ALLOPURINOL 500 MG: 100 TABLET ORAL at 05:33

## 2021-04-08 RX ADMIN — FINASTERIDE 5 MG: 5 TABLET, FILM COATED ORAL at 05:33

## 2021-04-08 RX ADMIN — INSULIN HUMAN 3 UNITS: 100 INJECTION, SOLUTION PARENTERAL at 18:02

## 2021-04-08 RX ADMIN — GABAPENTIN 300 MG: 300 CAPSULE ORAL at 05:32

## 2021-04-08 ASSESSMENT — ENCOUNTER SYMPTOMS
BLOOD IN STOOL: 0
SHORTNESS OF BREATH: 0
DIAPHORESIS: 0
FOCAL WEAKNESS: 0
WEIGHT LOSS: 1
DEPRESSION: 0
TREMORS: 0
NERVOUS/ANXIOUS: 0
ABDOMINAL PAIN: 1
NECK PAIN: 0
VOMITING: 0
FEVER: 0
TINGLING: 0
CHILLS: 0
PALPITATIONS: 0
COUGH: 0

## 2021-04-08 ASSESSMENT — LIFESTYLE VARIABLES
HAVE PEOPLE ANNOYED YOU BY CRITICIZING YOUR DRINKING: NO
EVER HAD A DRINK FIRST THING IN THE MORNING TO STEADY YOUR NERVES TO GET RID OF A HANGOVER: NO
AVERAGE NUMBER OF DAYS PER WEEK YOU HAVE A DRINK CONTAINING ALCOHOL: 1
TOTAL SCORE: 0
CONSUMPTION TOTAL: POSITIVE
ON A TYPICAL DAY WHEN YOU DRINK ALCOHOL HOW MANY DRINKS DO YOU HAVE: 2
ALCOHOL_USE: YES
HOW MANY TIMES IN THE PAST YEAR HAVE YOU HAD 5 OR MORE DRINKS IN A DAY: 10
DOES PATIENT WANT TO STOP DRINKING: NO
HAVE YOU EVER FELT YOU SHOULD CUT DOWN ON YOUR DRINKING: NO
TOTAL SCORE: 0
EVER FELT BAD OR GUILTY ABOUT YOUR DRINKING: NO
TOTAL SCORE: 0

## 2021-04-08 ASSESSMENT — PAIN DESCRIPTION - PAIN TYPE
TYPE: ACUTE PAIN
TYPE: ACUTE PAIN

## 2021-04-08 ASSESSMENT — COGNITIVE AND FUNCTIONAL STATUS - GENERAL
DAILY ACTIVITIY SCORE: 23
SUGGESTED CMS G CODE MODIFIER DAILY ACTIVITY: CI
WALKING IN HOSPITAL ROOM: A LITTLE
TOILETING: A LITTLE
STANDING UP FROM CHAIR USING ARMS: A LITTLE
MOBILITY SCORE: 21
SUGGESTED CMS G CODE MODIFIER MOBILITY: CJ
CLIMB 3 TO 5 STEPS WITH RAILING: A LITTLE

## 2021-04-08 ASSESSMENT — PATIENT HEALTH QUESTIONNAIRE - PHQ9
2. FEELING DOWN, DEPRESSED, IRRITABLE, OR HOPELESS: NOT AT ALL
SUM OF ALL RESPONSES TO PHQ9 QUESTIONS 1 AND 2: 0
1. LITTLE INTEREST OR PLEASURE IN DOING THINGS: NOT AT ALL

## 2021-04-08 ASSESSMENT — FIBROSIS 4 INDEX: FIB4 SCORE: 4.49

## 2021-04-08 NOTE — ED PROVIDER NOTES
ED Provider Note    Scribed for HELEN Crook II* by Josue Cortez-Reyes. 4/7/2021  6:03 PM    Means of Arrival: Walk-in  History obtained by: Patient and daughter  Limitations: None    CHIEF COMPLAINT  Chief Complaint   Patient presents with   • Sent by MD wolfe dx pancreatic CA, admitted 4 wks ago and stents placed, starting to become jaundice, supposed to have port place, sent for possible ERCP today         HPI  Zeus Rodríguez is a 75 y.o. male who presents to the Emergency Department after his doctor advised him to present to the ED due to his juandice. 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 His daughter states that  his GI stent was placed in [place] by Dr. Reed.  She adds that they were told to present to the ED for a possible ERCP placement today. He endorses intermittent mid abdominal pain, and loss of appetite. He denies any additional shortness of breath, diarrhea or chest pain. He states that he has previously had an endoscopy performed by Dr. Nassar adding that his regular GI provider is Dr. Palma ([facility]).    REVIEW OF SYSTEMS  Review of Systems   Constitutional: Negative for chills and fever.        Weight loss and decreased appetite   Respiratory: Negative for cough and shortness of breath.    Cardiovascular: Negative for chest pain.   Gastrointestinal: Positive for abdominal pain. Negative for constipation, diarrhea and vomiting.   Skin:        Positive for juandice   All other systems reviewed and are negative.    See HPI for further details.    PAST MEDICAL HISTORY   has a past medical history of Cancer (HCC) (2021), Cataract, Dental disorder, Diabetes (HCC), Heart burn, Hemorrhagic disorder (HCC), High cholesterol, Hypertension, Indigestion, Snoring, and Urinary bladder disorder.    SOCIAL HISTORY  Social History     Tobacco Use   • Smoking status: Former Smoker     Packs/day: 1.00      "Years: 30.00     Pack years: 30.00     Types: Cigarettes     Start date: 1961     Quit date: 1991     Years since quittin.2   • Smokeless tobacco: Former User   • Tobacco comment: quit 30 years ago   Substance and Sexual Activity   • Alcohol use: Not Currently     Alcohol/week: 1.2 oz     Types: 2 Cans of beer per week   • Drug use: Never   • Sexual activity: Not on file       SURGICAL HISTORY   has a past surgical history that includes knee arthroplasty total (Right, 2018); cataract phaco with iol (Bilateral, 2017); other (2016); other (2017); carpal tunnel release (Bilateral, 2017); upper gi endoscopy,diagnosis (3/24/2021); egd w/endoscopic ultrasound (3/24/2021); and egd with asp/bx (3/24/2021).    CURRENT MEDICATIONS  Home Medications     Reviewed by Da Marvin R.N. (Registered Nurse) on 21 at 0145  Med List Status: Complete   Medication Last Dose Status   ALLOPURINOL PO 2021 Active   amoxicillin-clavulanate (AUGMENTIN) 875-125 MG Tab 2021 Active   atorvastatin (LIPITOR) 10 MG Tab 2021 Active   cyanocobalamin (VITAMIN B12) 1000 MCG Tab 2021 Active   FINASTERIDE PO 2021 Active   gabapentin (NEURONTIN) 300 MG Cap 2021 Active   metFORMIN (GLUCOPHAGE) 500 MG Tab 2021 Active   omeprazole (PRILOSEC) 40 MG delayed-release capsule 2021 Active   oxyCODONE-acetaminophen (PERCOCET) 5-325 MG Tab >5 DAYS Active   sulfamethoxazole-trimethoprim (BACTRIM DS) 800-160 MG tablet 2021 Active   tamsulosin (FLOMAX) 0.4 MG capsule 2021 Active   valsartan (DIOVAN) 160 MG Tab 2021 Active   vitamin D (VITAMIND D3) 1000 UNIT Tab 2021 Active                ALLERGIES  Allergies   Allergen Reactions   • Morphine Nausea       PHYSICAL EXAM  VITAL SIGNS: /82   Pulse 77   Temp 36.3 °C (97.4 °F) (Temporal)   Resp 19   Ht 1.803 m (5' 11\")   Wt 89.8 kg (197 lb 15.6 oz)   SpO2 97%   BMI 27.61 kg/m²     Pulse ox interpretation: I interpret this pulse ox as " normal.  Constitutional: Alert in no apparent distress.  Pleasant 75-year-old man, appears jaundiced.  HENT: No signs of trauma, Bilateral external ears normal, Nose normal.   Eyes: Pupils are equal, Conjunctiva normal, Scleral icterus  Neck: Normal range of motion, No tenderness, Supple, No stridor.   Cardiovascular: Regular rate and rhythm, no murmurs. Symmetric distal pulses. No cyanosis of extremities. No peripheral edema of extremities.  Thorax & Lungs: Normal breath sounds, No respiratory distress, No wheezing, No chest tenderness.   Abdomen:Soft, mild right upper quadrant tenderness without guarding, No masses, No pulsatile masses. No peritoneal signs.  Skin: Warm, Dry, jaundice.   Back: No midline bony tenderness, No CVA tenderness.   Musculoskeletal: Good range of motion in all major joints. No tenderness to palpation or major deformities noted.   Neurologic: Alert , Normal motor function, Normal sensory function, No focal deficits noted.   Psychiatric: Affect normal, Judgment no    DIAGNOSTIC STUDIES / PROCEDURES    Pertinent Labs & Imaging studies reviewed. (See chart for details)    COURSE & MEDICAL DECISION MAKING  Pertinent Labs & Imaging studies reviewed. (See chart for details)    6:03 PM This is a 75 y.o. male who presents with adenocarcinoma of his pancreas with worsening juandice, concerns for possible biliary stent obstruction, may need ERCP for further evaluation.  Serum studies CBC, CMP ordered.  Also ordered a Covid assay given this is hospital policy for all hospitalized patients.  I do anticipate hospitalization.  Will wait for labs before contacting GI and hospitalist. Patient verbalizes understanding and agreement to this plan of care.  He is scheduled for chemotherapy port placement tomorrow here at [facility].  This can be further discussed with hospitalist service once he is hospitalized as to if this can be done during his stay.    8:39 PM- Paged GI    8:51 PM -bilirubin level of  6.  , ALT 69, alk phos 431.  This is likely obstructive jaundice.  I do not have comparison labs.  I discussed the patient's case and the above findings with Dr. Tyler (GI) who will arrange for evaluation tomorrow morning.  I have asked Mr. Rodríguez not to eat or drink anything after midnight.    9:06 PM - Paged Hospitalist     9:12 PM I discussed the patient's case and the above findings with Dr. Berg (hospitalist) who has agreed to arrange for hospitalization.      DISPOSITION:  Patient will be hospitalized by Dr. Berg in guarded condition.      FINAL IMPRESSION  1. Jaundice    Pancreatic cancer     I, Josue Cortez-Reyes (Scribe), am scribing for, and in the presence of, NENO Crook II.    Electronically signed by: Josue Cortez-Reyes (Scribe), 4/7/2021    IHenrik II, M* personally performed the services described in this documentation, as scribed by Josue Cortez-Reyes in my presence, and it is both accurate and complete. C.    The note accurately reflects work and decisions made by me.  Henrik Pelletier II, M.D.  4/8/2021  2:06 AM

## 2021-04-08 NOTE — ED NOTES
Med Rec complete per Pt w/family present at bedside.  Allergies reviewed.  Pt states that he was not taking his BP meds during the course of his ABX use because it was dropping his BP. He states he took was 4/6/21 PM because he noticed his BP was increasing.     Pt was taking Bactrim DS and Augment for a 14 day course and was finished 4/2/21.    Home [facility]  ([phone])

## 2021-04-08 NOTE — ASSESSMENT & PLAN NOTE
In setting of pancreatic head cancer s/p biliary stent placement in recent past.  Likely obstructive Jaundice  GI consulted: Dr. Carrillo  For likely ERCP, keep npo past midnight.  Rocephin/Flagyl ordered 1 dose.

## 2021-04-08 NOTE — DISCHARGE PLANNING
Mr. Rodríguez was scheduled for a cephalic power port insertion 4/9 at 1015 by Dr. Quintanilla prior to admission. It appears that he will be scheduled for an ERCP today and anticipate he will stay overnight . Notified Dr. Quintanilla' office of admission and left a VM for the [address], regarding the scheduled procedure.

## 2021-04-08 NOTE — DIETARY
"Nutrition services: Day 1 of admit.  Zeus Rodríguez is a 75 y.o. male with admitting DX of nakul.   Consult received for MST 5 (34 + lb wt loss x 3 mo, poor oral intake).    Spoke with patient and wife at bedside. Patient reports UBW of 225 lb but was unsure when he last weighed this. Per chart review, patient was last at this weight in early February - see chart review weight history in evaluation below.     Patient reports decreased appetite over the past couple months. Noted with abdominal pain, lethargy. Patient's wife relays he is normally a \"good eater\"; observe he has been eating <50% of his baseline intake during this time.    Patient notes he did not like the chicken and orzo he received for dinner last night - added to dislikes in computer. Patient eager for diet advancement following procedure, was agreeable to receive shakes (Boost GC) in all flavors once diet advances.     Assessment:  Height: 180.3 cm (5' 11\")  Weight: 89 kg (196 lb 3.4 oz) via bed scale 4/8  Body mass index is 27.37 kg/m²., BMI classification: overweight  Diet/Intake: NPO at this time pending anticipated ERCP today.     Evaluation:   1. PMH: T2DM, pancreatic cancer  2. S/p recent biliary stent placement  3. Weight history: 12.8% wt loss x 2 mo is clinically significant, severe  · 3/6/21 96.6 kg (212 lb)  · 2/2/21 102.1 kg (225 lb)  4. MAR: SSI, NS @ 75 mL/h  5. Labs: Na 134 (L), glu 211-224 (H),  (H), ALT 61 (H), Alk Phos 411 (H), A1c 8.9 (H), creat 0.30 (L)  6. Pitting abdominal edema documented.     Malnutrition Risk: Patient with severe chronic malnutrition RT pancreatic cancer AEB severe 12.8% wt loss and PO <50% typical x 2 mo.     Recommendations/Plan:  1. Obtain order for Boost Glucose Control supplements per RD poor PO protocol once diet advances. Once diet advances, add Boost Glucose Control (all flavors) with all meals.   2. Advance diet per MD.  3. Encourage intake of meals and supplements once diet " advanced.  4. Document intake of all meals and supplements as % taken in ADL's to provide interdisciplinary communication across all shifts.   5. Monitor weight.  6. Nutrition rep will continue to see patient for ongoing meal and snack preferences.     RD following.

## 2021-04-08 NOTE — PROGRESS NOTES
2 RN skin check completed with HAY De La Torre  Devices in place N/A.  Skin assessed under devices N/A.  Confirmed pressure ulcers found on N/A.  New potential pressure ulcers noted on N/A. Wound consult placed N/A.    Noted redness, blanching in bilateral back of the ears; bilateral elbows pink and blanching. Other areas of skin is intact, no open wounds seen     The following interventions in place; encouraged to turn to sides; on pressure redistribution mattress.

## 2021-04-08 NOTE — CARE PLAN
Problem: Pain Management  Goal: Pain level will decrease to patient's comfort goal  4/8/2021 0226 by STEPHANIE FrankNDeni  Outcome: PROGRESSING AS EXPECTED  4/8/2021 0220 by Da Marvin R.N.  Outcome: PROGRESSING AS EXPECTED     Problem: Communication  Goal: The ability to communicate needs accurately and effectively will improve  Outcome: PROGRESSING AS EXPECTED

## 2021-04-08 NOTE — PROGRESS NOTES
Received bedside report and accepted care of patient.    Pt is currently A/ox4, room air with no visible or stated sign of distress, discomfort, or Sob. Pt currently is NPO, sips with medications for anticipated interventions. Pt is glucose checks q6hrs. Pt is continent of bowel. Pt has one PIV right forearm, running NS at 75mL/hr. Pt is upself with minor assistance.    Patient currently resting in bed in no visible or stated signs of distress. Bed alarm in place, controls on and bed in locked and lowest position. Call light and personal possessions within reach. Patient educated about use of call light and verbalized understanding.

## 2021-04-08 NOTE — CARE PLAN
Problem: Nutritional:  Goal: Achieve adequate nutritional intake  Description: Patient will consume >50% of meals and supplements on diet advanced past clear liquids  Outcome: NOT MET   See RD note; RD following.

## 2021-04-08 NOTE — PROGRESS NOTES
Received report from Riya. Arrived in the unit via gurney. Belongings is at bedside. Pt assisted and ambulated to the bed. Diet reinstructed to be on NPO. Head to toe assessment done. Admission profile done. Alert and oriented x4. Safety precautions in placed. Bed in lowest position. Upper side rails up. Treaded socks on. Reinforced the use of call light when needing assistance.

## 2021-04-08 NOTE — H&P
Hospital Medicine History & Physical Note    Date of Service  4/8/2021    Primary Care Physician  Ric Delgadillo M.D.    Consultants  [facility] Dr. Carrillo.    Code Status  Full Code    Chief Complaint  Chief Complaint   Patient presents with   • Sent by MD     feb dx pancreatic CA, admitted 4 wks ago and stents placed, starting to become jaundice, supposed to have port place, sent for possible ERCP today         History of Presenting Illness  75M with recent diagnosis of pancreatic head cancer diabetes hemorrhagic disorder and urinary bladder disorder presents with jaundice sent from outpatient physician for evaluation.  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 Moore.  The patient's daughter states they were told to present to the ED for ERCP placement.  Patient is complaining of abdominal pain loss of appetite and difficulty passing gas. Denies sob, cough, chest pain/pressure, extremity swelling, diaphoresis, fever, chills, nausea, vomiting, hemoptysis, diarrhea, headaches, dysuria/dyspareunia, polyuria, or polydipsia.     Upon admission no white count elevated glucose of 224 elevated AST of 129 elevated ALT 69  1T bili 6.3 and mild anemia hemoglobin 12.2 macrocytic.  ED Dr. Huff has consulted GI consultants with Dr. Calderon who took the call and agreed to evaluate the patient.  Will admit patient for jaundice and elevated bilirubin keep n.p.o. with anticipated ERCP tomorrow morning.    Review of Systems  ROS  10+ systems reviewed and negative except as noted in HPI.    Past Medical History   has a past medical history of Cancer (HCC) (2021), Cataract, Dental disorder, Diabetes (HCC), Heart burn, Hemorrhagic disorder (HCC), High cholesterol, Hypertension, Indigestion, Snoring, and Urinary bladder disorder.    Surgical History   has a past surgical history that includes knee arthroplasty total (Right, 2018); cataract phaco  with iol (Bilateral, 2017); other (2016); other (2017); carpal tunnel release (Bilateral, 2017); pr upper gi endoscopy,diagnosis (3/24/2021); egd w/endoscopic ultrasound (3/24/2021); and egd with asp/bx (3/24/2021).     Family History  family history is not on file.   No pertinent family history elicited    Social History   reports that he quit smoking about 30 years ago. His smoking use included cigarettes. He started smoking about 60 years ago. He has a 30.00 pack-year smoking history. He has quit using smokeless tobacco. He reports previous alcohol use of about 1.2 oz of alcohol per week. He reports that he does not use drugs.    Allergies  Allergies   Allergen Reactions   • Morphine Nausea       Medications  Prior to Admission Medications   Prescriptions Last Dose Informant Patient Reported? Taking?   ALLOPURINOL PO   Yes No   Sig: Take 500 mg by mouth.   Acetaminophen (TYLENOL 8 HOUR PO)   Yes No   Sig: Take  by mouth.   FINASTERIDE PO   Yes No   Sig: Take  by mouth.   NON SPECIFIED   Yes No   Sig: Vitamin B3   amoxicillin-clavulanate (AUGMENTIN) 875-125 MG Tab   Yes No   Sig: Take 1 tablet by mouth 2 times a day.   atorvastatin (LIPITOR) 10 MG Tab   Yes No   Sig: Take 10 mg by mouth every evening.   cyanocobalamin (VITAMIN B12) 1000 MCG Tab   Yes No   Sig: Take  by mouth every day.   gabapentin (NEURONTIN) 300 MG Cap   Yes No   Sig: Take 300 mg by mouth 3 times a day.   metFORMIN (GLUCOPHAGE) 500 MG Tab   Yes No   Sig: TAKE ONE TABLET BY MOUTH ONCE A DAY WITH A MEAL FOR DIABETES   oxyCODONE-acetaminophen (PERCOCET) 5-325 MG Tab   Yes No   Sig: TAKE 2 TABLETS BY MOUTH EVERY 4 HOURS AS NEEDED FOR PAIN   pantoprazole (PROTONIX) 40 MG Tablet Delayed Response   Yes No   Sig: Take 40 mg by mouth.   sulfamethoxazole-trimethoprim (BACTRIM DS) 800-160 MG tablet   Yes No   Sig: TAKE 1 TABLET BY MOUTH TWICE DAILY FOR 14 DAYS   tamsulosin (FLOMAX) 0.4 MG capsule   Yes No   Sig: TAKE TWO CAPSULES BY MOUTH ONCE A DAY 30  MINUTES AFTER SAME MEAL EACH DAY; FOR PROSTATE ** DO NOT TAKE WITHIN 4 HOURS OF VIAGRA ** (DOSE INCREASE)   traMADol HCl 100 MG Tab   Yes No   Sig: Take 100 mg by mouth.   valsartan (DIOVAN) 160 MG Tab   Yes No   Sig: Take 160 mg by mouth every day.   vitamin D (VITAMIND D3) 1000 UNIT Tab   Yes No   Sig: Take 1,000 Units by mouth every day.      Facility-Administered Medications: None       Physical Exam  Temp:  [36.2 °C (97.2 °F)-36.3 °C (97.4 °F)] 36.2 °C (97.2 °F)  Pulse:  [67-99] 71  Resp:  [14-26] 16  BP: (104-149)/(45-89) 122/73  SpO2:  [94 %-98 %] 94 %    Physical Exam  Physical Exam  Vitals and nursing note reviewed.  Constitutional:     General: Alert in no acute distress.    Appearance: Pt is not ill-appearing.     Comments:   HENT: No signs of trauma, Bilateral external ears normal, Nose normal.      Head: Normocephalic.     Mouth/Throat: Unremarkable. Moist Mucosa     Comments:   Eyes:      Pupils: Pupils are equal round and reactive to light, Conjunctiva normal, Non-icteric.   Neck: Normal range of motion, No tenderness, Supple, No stridor.   Cardiovascular:      Rate and Rhythm: Regular Rate and Rhythm     Heart sounds: No murmur, rubs, or gallops appreciated.  Pulmonary/Thorax & Lungs:      Effort: No respiratory distress.     Breath sounds: No stridor. No wheezing, No Rhonchi, no palpable chest tenderness     Comments:    Abdomen:     General: There is no distension.      Palpation/Auscultation: Soft, No tenderness, No masses, No pulsatile masses. Bowel sounds normal. No rebound/peritoneal signs. Negative Mcgee sign.     Hernia: No hernia is appreciated at this time.   Skin: Warm, Dry, No erythema, No rash.  Jaundiced     Coloration: Skin is not jaundiced or pale.   Back: No bony tenderness, No CVA tenderness.   Musculoskeletal:         General: No swelling or tenderness.   Extremities:       General: Intact distal pulses, No edema, No tenderness, No cyanosis      Vascular:   Neurologic/Psych:  Alert, No focal deficits noted.       Comments:         Laboratory:  Recent Labs     04/07/21  1740 04/08/21  0120   WBC 6.5 6.4   RBC 3.66* 3.55*   HEMOGLOBIN 12.2* 11.8*   HEMATOCRIT 36.2* 34.9*   MCV 98.9* 98.3*   MCH 33.3* 33.2*   MCHC 33.7 33.8   RDW 54.0* 55.4*   PLATELETCT 256 247   MPV 12.2 12.0     Recent Labs     04/07/21 1740 04/08/21  0120   SODIUM 131* 134*   POTASSIUM 3.7 3.6   CHLORIDE 99 100   CO2 21 23   GLUCOSE 224* 211*   BUN 7* 6*   CREATININE 0.30* 0.30*   CALCIUM 9.0 8.7     Recent Labs     04/07/21 1740 04/08/21  0120   ALTSGPT 69* 61*   ASTSGOT 129* 114*   ALKPHOSPHAT 431* 411*   TBILIRUBIN 6.3* 5.8*   GLUCOSE 224* 211*         No results for input(s): NTPROBNP in the last 72 hours.  Recent Labs     04/08/21  0120   TRIGLYCERIDE 158*   HDL 17*   LDL 96     No results for input(s): TROPONINT in the last 72 hours.    Imaging:  No orders to display         Assessment/Plan:  I anticipate this patient will require at least two midnights for appropriate medical management, necessitating inpatient admission.    Jaundice- (present on admission)  Assessment & Plan  In setting of pancreatic head cancer s/p biliary stent placement in recent past.  Likely obstructive Jaundice  GI consulted: Dr. Carrillo  For likely ERCP, keep npo past midnight.  Rocephin/Flagyl ordered 1 dose.    DM (diabetes mellitus) (HCC)- (present on admission)  Assessment & Plan  a1c  Glucose in 200's upon admission.  ISS while NPO  Home med: Metformin not ordered.    Abdominal pain- (present on admission)  Assessment & Plan  Continue Home Percocet and tramadol      Malignant neoplasm of head of pancreas (HCC)- (present on admission)  Assessment & Plan  Insetting of biliary stent placement in the recent past    Hemorrhagic disorder (HCC)- (present on admission)  Assessment & Plan  chronic    Hypertension- (present on admission)  Assessment & Plan  Continue home medication valsartan and monitor.    BPH (benign prostatic hyperplasia)-  (present on admission)  Assessment & Plan  Cont. Tamsulosin/finasteride home med.

## 2021-04-08 NOTE — CONSULTS
Gastroenterology Consult Note:    Miky Garcia M.D.  Date & Time note created:    4/8/2021   11:03 AM     Referring MD:  Dr. Paola Barboza    Patient ID:  Name:             Zeus Rodríguez   YOB: 1945  Age:                 75 y.o.  male   MRN:               [id number]                                                             Reason for Consult:      Jaundice    History of Present Illness:    Zeus Rodríguez has pancreatic cancer with biliary obstruction that was successfully stented in 3/2021.  Surgical resection is being considered.  .  About 2/2021 he developed mild to severe upper abdominal pain.  He subsequently developed jaundice, and evaluation found pancreatic mass with partial biliary obstruction.  .  ERCP 3/8/2021: 15 mm distal CBD stricture with a dilated proximal CBD diameter of 15 mm.  Also, 20 mm distal pancreatic duct stricture with a dilated proximal PD diameter of 9 mm.  Brushing of both ducts were positive for adenocarcinoma.  ERS was performed, followed by biliary stenting (11.5 Uzbek 5 cm biliary stent), and pancreatic stenting (4 Uzbek 5 cm single pigtail pancreatic stent).  .  EUS on 3/24: Removed the pancreatic stent.  29 mm pancreatic head mass abutting the portal vein, but with no invasion of the portal vein.  .  He has been evaluated by Dr. Quintanilla, and surgical treatment is being considered.  .  The mild to severe upper abdominal pain has been continuously present since it started.  About 4/5 his wife noticed mild jaundice that worsened, so he was admitted through the ER last night.  Liver tests were elevated at [phone] initially.    Assessment:  Partial biliary obstruction  This problem is probably due to stent occlusion.  Arrange for an ERCP.    Pancreatic head cancer causing stricture of the CBD and PD    Hepatomegaly  Probably related to the partial biliary obstruction.  The chart does not include any prior imaging of the abdomen.  Initial  evaluation was performed at [facility].    Lovenox treatment  In hospital, Lovenox was ordered, but he refused it and now Lovenox is being held in anticipation of the ERCP.    Plan:   Hold Lovenox for now  Arrange for ERCP.    Labs:  Recent Labs     04/07/21  1740 04/08/21  0120   WBC 6.5 6.4   RBC 3.66* 3.55*   HEMOGLOBIN 12.2* 11.8*   HEMATOCRIT 36.2* 34.9*   MCV 98.9* 98.3*   MCH 33.3* 33.2*   MCHC 33.7 33.8   RDW 54.0* 55.4*   PLATELETCT 256 247   MPV 12.2 12.0       review  Recent Labs     04/07/21  1740 04/08/21  0120   SODIUM 131* 134*   POTASSIUM 3.7 3.6   CHLORIDE 99 100   CO2 21 23   GLUCOSE 224* 211*   BUN 7* 6*   CREATININE 0.30* 0.30*   CALCIUM 9.0 8.7     Recent Labs     04/07/21 1740 04/08/21  0120   ALTSGPT 69* 61*   ASTSGOT 129* 114*   ALKPHOSPHAT 431* 411*   TBILIRUBIN 6.3* 5.8*   GLUCOSE 224* 211*       No results found for: BLOODCULTU, BLDCULT, BCHOLD     GI/Nutrition:  Orders Placed This Encounter   Procedures   • Diet NPO     Standing Status:   Standing     Number of Occurrences:   1     Order Specific Question:   Restrict to:     Answer:   Sips with Medications [3]       Past Medical History:   Past Medical History:   Diagnosis Date   • Cancer (HCC) 2021    pancreatic   • Cataract     had surgery   • Dental disorder     upper dentures   • Diabetes (HCC)    • Heart burn    • Hemorrhagic disorder (HCC)     nose-bleeds   • High cholesterol    • Hypertension    • Indigestion    • Snoring    • Urinary bladder disorder        Past Surgical History:  Past Surgical History:   Procedure Laterality Date   • PB UPPER GI ENDOSCOPY,DIAGNOSIS  3/24/2021    Procedure: GASTROSCOPY;  Surgeon: Peter Gaines M.D.;  Location: [facility];  Service: EUS   • EGD W/ENDOSCOPIC ULTRASOUND  3/24/2021    Procedure: EGD, WITH ENDOSCOPIC US;  Surgeon: Peter Gaines M.D.;  Location: [facility];  Service: EUS   • EGD WITH ASP/BX  3/24/2021    Procedure: EGD, WITH ASPIRATION BIOPSY;   Surgeon: Peter Gaines M.D.;  Location: SURGERY [facility];  Service: EUS   • KNEE ARTHROPLASTY TOTAL Right 2018   • CATARACT PHACO WITH IOL Bilateral 2017   • OTHER  2017    L3,4,5 surgery   • CARPAL TUNNEL RELEASE Bilateral 2017   • OTHER  2016    cervical fusion       Medication Allergy:  Allergies   Allergen Reactions   • Morphine Nausea       Hospital Medications:  IV infusions: None  omeprazole, 40 mg, Oral, DAILY  gabapentin, 300 mg, Oral, TID  atorvastatin, 10 mg, Oral, Nightly  valsartan, 160 mg, Oral, DAILY  allopurinol, 500 mg, Oral, DAILY  tamsulosin, 0.4 mg, Oral, DAILY  finasteride, 5 mg, Oral, DAILY  senna-docusate, 2 tablet, Oral, BID  insulin regular, 2-9 Units, Subcutaneous, Q6HRS  enoxaparin (LOVENOX) injection, 30 mg, Subcutaneous, Q12HRS      traMADol, oxyCODONE-acetaminophen, senna-docusate **AND** polyethylene glycol/lytes **AND** magnesium hydroxide **AND** bisacodyl, acetaminophen, labetalol, ondansetron, ondansetron, insulin regular **AND** POC blood glucose manual result **AND** NOTIFY MD and PharmD **AND** glucose **AND** dextrose 50%    Current Outpatient Medications:  No current facility-administered medications on file prior to encounter.     Current Outpatient Medications on File Prior to Encounter   Medication Sig Dispense Refill   • omeprazole (PRILOSEC) 40 MG delayed-release capsule Take 40 mg by mouth every day.     • ALLOPURINOL PO Take 500 mg by mouth every day.     • vitamin D (VITAMIND D3) 1000 UNIT Tab Take 1,000 Units by mouth every day.     • cyanocobalamin (VITAMIN B12) 1000 MCG Tab Take  by mouth every day.     • metFORMIN (GLUCOPHAGE) 500 MG Tab Take 500 mg by mouth 2 times a day.     • oxyCODONE-acetaminophen (PERCOCET) 5-325 MG Tab Take 2 Tablets by mouth every four hours as needed.     • sulfamethoxazole-trimethoprim (BACTRIM DS) 800-160 MG tablet Take 1 tablet by mouth 2 times a day.     • tamsulosin (FLOMAX) 0.4 MG capsule Take 0.8 mg by mouth every day.   "   • atorvastatin (LIPITOR) 10 MG Tab Take 10 mg by mouth every evening.     • gabapentin (NEURONTIN) 300 MG Cap Take 300 mg by mouth 3 times a day.     • valsartan (DIOVAN) 160 MG Tab Take 160 mg by mouth every day.     • FINASTERIDE PO Take  by mouth.     • amoxicillin-clavulanate (AUGMENTIN) 875-125 MG Tab Take 1 tablet by mouth 2 times a day.         Physical Exam:  Vitals/ General Appearance:   Weight/BMI: Body mass index is 27.37 kg/m².  /66   Pulse 72   Temp 37.4 °C (99.4 °F) (Temporal)   Resp 17   Ht 1.803 m (5' 11\")   Wt 89 kg (196 lb 3.4 oz)   SpO2 96%   Vitals:    04/08/21 0020 04/08/21 0322 04/08/21 0537 04/08/21 0800   BP: 149/76 104/45 122/73 127/66   Pulse: 67 71  72   Resp: 16 16  17   Temp: 36.2 °C (97.2 °F) 36.2 °C (97.2 °F)  37.4 °C (99.4 °F)   TempSrc: Temporal Temporal  Temporal   SpO2: 95% 94%  96%   Weight: 89 kg (196 lb 3.4 oz)      Height:         Oxygen Therapy:  Pulse Oximetry: 96 %, O2 (LPM): 0, O2 Delivery Device: None - Room Air    Physical Exam   Constitutional: He is well-developed, well-nourished, and in no distress.   HENT:   Head: Normocephalic and atraumatic.   Mouth/Throat: Oropharynx is clear and moist.   Eyes: Pupils are equal, round, and reactive to light. EOM are normal. Scleral icterus (Mild scleral icterus.) is present.   Neck: No thyromegaly present.   Cardiovascular: Normal heart sounds. Exam reveals no friction rub.   No murmur heard.  Pulmonary/Chest: He has no wheezes. He has no rales.   Normal breath sounds anteriorly.   Abdominal: He exhibits no mass. There is abdominal tenderness.   Mild upper abdominal tenderness with voluntary guarding.  The liver was 2+ firm and palpable 8 cm below the xiphoid and 6 cm below the RCM.  No other masses, tenderness or organomegaly.   Musculoskeletal:         General: No edema.      Cervical back: Neck supple.   Lymphadenopathy:     He has no cervical adenopathy.   Neurological: He is alert. No cranial nerve deficit. "   Skin: No rash noted. No erythema.   Psychiatric: Memory and affect normal.   Vitals reviewed.      Review of Systems:      Review of Systems   Respiratory: Negative for cough and shortness of breath.    Cardiovascular: Negative for chest pain and palpitations.   Gastrointestinal: Positive for abdominal pain. Negative for blood in stool, melena and vomiting.             Family History:  History reviewed. No pertinent family history.    Social History:  Social History     Socioeconomic History   • Marital status:      Spouse name: Not on file   • Number of children: Not on file   • Years of education: Not on file   • Highest education level: Not on file   Occupational History   • Not on file   Tobacco Use   • Smoking status: Former Smoker     Packs/day: 1.00     Years: 30.00     Pack years: 30.00     Types: Cigarettes     Start date: 1961     Quit date: 1991     Years since quittin.2   • Smokeless tobacco: Former User   • Tobacco comment: quit 30 years ago   Substance and Sexual Activity   • Alcohol use: Not Currently     Alcohol/week: 1.2 oz     Types: 2 Cans of beer per week   • Drug use: Never   • Sexual activity: Not on file   Other Topics Concern   • Not on file   Social History Narrative   • Not on file     Social Determinants of Health     Financial Resource Strain:    • Difficulty of Paying Living Expenses:    Food Insecurity:    • Worried About Running Out of Food in the Last Year:    • Ran Out of Food in the Last Year:    Transportation Needs:    • Lack of Transportation (Medical):    • Lack of Transportation (Non-Medical):    Physical Activity:    • Days of Exercise per Week:    • Minutes of Exercise per Session:    Stress:    • Feeling of Stress :    Social Connections:    • Frequency of Communication with Friends and Family:    • Frequency of Social Gatherings with Friends and Family:    • Attends Denominational Services:    • Active Member of Clubs or Organizations:    • Attends Club or  Organization Meetings:    • Marital Status:    Intimate Partner Violence:    • Fear of Current or Ex-Partner:    • Emotionally Abused:    • Physically Abused:    • Sexually Abused:        MDM (Data Review):     Records reviewed and summarized in current documentation    Problem List:     Patient Active Problem List    Diagnosis Date Noted   • Jaundice 04/07/2021   • DM (diabetes mellitus) (HCC) 04/07/2021   • Malignant neoplasm of head of pancreas (HCC) 03/30/2021   • Abdominal pain 03/30/2021   • BPH (benign prostatic hyperplasia) 04/08/2021   • Hypertension 04/08/2021   • Hemorrhagic disorder (HCC) 04/08/2021   • Abnormal CT of the abdomen 03/30/2021   • Elevated CA 19-9 level 03/30/2021         Thank your for the opportunity to assist in the care of your patient.  Please call for any questions or concerns.    Miky Garcia M.D.

## 2021-04-08 NOTE — PROGRESS NOTES
[facility] Medicine Daily Progress Note    Date of Service  4/8/2021    Chief Complaint  75 y.o. male admitted 4/7/2021 with jaundice    Hospital Course  75M with recent diagnosis of pancreatic head cancer diabetes hemorrhagic disorder and urinary bladder disorder presents with jaundice sent from outpatient physician for evaluation.  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 Moore.  The patient's daughter states they were told to present to the ED for ERCP placement.  Patient is complaining of abdominal pain loss of appetite and difficulty passing gas. Denies sob, cough, chest pain/pressure, extremity swelling, diaphoresis, fever, chills, nausea, vomiting, hemoptysis, diarrhea, headaches, dysuria/dyspareunia, polyuria, or polydipsia.      Upon admission no white count elevated glucose of 224 elevated AST of 129 elevated ALT 69  1T bili 6.3 and mild anemia hemoglobin 12.2 macrocytic.  ED Dr. Huff has consulted GI consultants with Dr. Calderon who took the call and agreed to evaluate the patient.  Will admit patient for jaundice and elevated bilirubin keep n.p.o. with anticipated ERCP tomorrow morning    Interval Problem Update  Patient seen this morning appears to be jaundice otherwise doing okay , decreased appetite for the past few months.  He is complaining of some abdominal pain which is mild nonradiating nonspecific.  Notes and labs have been reviewed by me deep detail patient will likely get a ERCP by GI later today.    Consultants/Specialty  GI    Code Status  Full Code    Disposition  Pending  ERCP today    Review of Systems  Review of Systems   Constitutional: Positive for malaise/fatigue and weight loss. Negative for diaphoresis.   HENT: Negative for congestion, ear discharge and tinnitus.    Respiratory: Negative for cough and shortness of breath.    Cardiovascular: Negative for chest pain, palpitations and leg swelling.    Gastrointestinal: Positive for abdominal pain. Negative for blood in stool, melena and vomiting.   Genitourinary: Negative for dysuria and frequency.   Musculoskeletal: Negative for joint pain and neck pain.   Skin: Negative for rash.   Neurological: Negative for tingling, tremors and focal weakness.   Psychiatric/Behavioral: Negative for depression and suicidal ideas. The patient is not nervous/anxious.    All other systems reviewed and are negative.       Physical Exam  Temp:  [36.2 °C (97.2 °F)-37.4 °C (99.4 °F)] 37.4 °C (99.4 °F)  Pulse:  [67-99] 72  Resp:  [14-26] 17  BP: (104-149)/(45-89) 127/66  SpO2:  [94 %-98 %] 96 %    Physical Exam  Constitutional:       Appearance: Normal appearance. He is obese.      Comments: Tired, fatigue   HENT:      Head: Normocephalic and atraumatic.      Left Ear: There is no impacted cerumen.      Nose: Nose normal. No congestion.      Mouth/Throat:      Mouth: Mucous membranes are moist.      Pharynx: No oropharyngeal exudate.   Eyes:      General:         Right eye: No discharge.      Conjunctiva/sclera: Conjunctivae normal.      Pupils: Pupils are equal, round, and reactive to light.   Cardiovascular:      Rate and Rhythm: Normal rate.      Heart sounds: No murmur. No friction rub.   Pulmonary:      Effort: Pulmonary effort is normal. No respiratory distress.      Breath sounds: Normal breath sounds. No stridor. No wheezing.   Abdominal:      General: Abdomen is flat. Bowel sounds are normal. There is no distension.      Palpations: There is no mass.      Tenderness: There is no guarding.   Musculoskeletal:         General: No swelling, tenderness or signs of injury. Normal range of motion.      Cervical back: Normal range of motion and neck supple.   Skin:     General: Skin is warm and dry.      Coloration: Skin is not jaundiced.   Neurological:      General: No focal deficit present.      Mental Status: He is alert. Mental status is at baseline. He is disoriented.       Cranial Nerves: No cranial nerve deficit.      Motor: No weakness.   Psychiatric:         Mood and Affect: Mood normal.         Behavior: Behavior normal.         Thought Content: Thought content normal.         Fluids    Intake/Output Summary (Last 24 hours) at 4/8/2021 1157  Last data filed at 4/8/2021 0830  Gross per 24 hour   Intake 0 ml   Output 450 ml   Net -450 ml       Laboratory  Recent Labs     04/07/21  1740 04/08/21  0120   WBC 6.5 6.4   RBC 3.66* 3.55*   HEMOGLOBIN 12.2* 11.8*   HEMATOCRIT 36.2* 34.9*   MCV 98.9* 98.3*   MCH 33.3* 33.2*   MCHC 33.7 33.8   RDW 54.0* 55.4*   PLATELETCT 256 247   MPV 12.2 12.0     Recent Labs     04/07/21  1740 04/08/21  0120   SODIUM 131* 134*   POTASSIUM 3.7 3.6   CHLORIDE 99 100   CO2 21 23   GLUCOSE 224* 211*   BUN 7* 6*   CREATININE 0.30* 0.30*   CALCIUM 9.0 8.7             Recent Labs     04/08/21  0120   TRIGLYCERIDE 158*   HDL 17*   LDL 96       Imaging  No orders to display        Assessment/Plan  Jaundice- (present on admission)  Assessment & Plan  In setting of pancreatic head cancer s/p biliary stent placement in recent past.  Likely obstructive Jaundice  GI consulted: Dr. Carrillo, likely to take patient for ERCP today   keep npo   Rocephin/Flagyl ordered 1 dose.    DM (diabetes mellitus) (HCC)- (present on admission)  Assessment & Plan  a1c  Glucose in 200's upon admission.  ISS while NPO  Home med: Metformin not ordered.    Abdominal pain- (present on admission)  Assessment & Plan  Continue Home Percocet and tramadol      Malignant neoplasm of head of pancreas (HCC)- (present on admission)  Assessment & Plan  Insetting of biliary stent placement in the recent past    Hemorrhagic disorder (HCC)- (present on admission)  Assessment & Plan  chronic    Hypertension- (present on admission)  Assessment & Plan  Continue home medication valsartan and monitor.    BPH (benign prostatic hyperplasia)- (present on admission)  Assessment & Plan  Cont. Tamsulosin/finasteride  home med.         VTE prophylaxis: lovenox    Discussed plan of care with patient, nursing will likely go for ERCP today with GI.  Will determine his plan of care further.

## 2021-04-09 ENCOUNTER — PATIENT OUTREACH (OUTPATIENT)
Dept: HEALTH INFORMATION MANAGEMENT | Facility: OTHER | Age: 76
End: 2021-04-09

## 2021-04-09 ENCOUNTER — APPOINTMENT (OUTPATIENT)
Dept: RADIOLOGY | Facility: MEDICAL CENTER | Age: 76
End: 2021-04-09
Attending: SURGERY
Payer: COMMERCIAL

## 2021-04-09 ENCOUNTER — ANESTHESIA EVENT (OUTPATIENT)
Dept: SURGERY | Facility: MEDICAL CENTER | Age: 76
End: 2021-04-09
Payer: COMMERCIAL

## 2021-04-09 ENCOUNTER — ANESTHESIA (OUTPATIENT)
Dept: SURGERY | Facility: MEDICAL CENTER | Age: 76
End: 2021-04-09
Payer: COMMERCIAL

## 2021-04-09 VITALS
SYSTOLIC BLOOD PRESSURE: 143 MMHG | RESPIRATION RATE: 17 BRPM | TEMPERATURE: 97.9 F | HEIGHT: 71 IN | DIASTOLIC BLOOD PRESSURE: 84 MMHG | WEIGHT: 196.21 LBS | HEART RATE: 71 BPM | BODY MASS INDEX: 27.47 KG/M2 | OXYGEN SATURATION: 96 %

## 2021-04-09 LAB
EKG IMPRESSION: NORMAL
GLUCOSE BLD-MCNC: 158 MG/DL (ref 65–99)
GLUCOSE BLD-MCNC: 181 MG/DL (ref 65–99)
SARS-COV-2 RNA RESP QL NAA+PROBE: NOTDETECTED
SPECIMEN SOURCE: NORMAL

## 2021-04-09 PROCEDURE — 700105 HCHG RX REV CODE 258: Performed by: ANESTHESIOLOGY

## 2021-04-09 PROCEDURE — 700101 HCHG RX REV CODE 250: Performed by: SURGERY

## 2021-04-09 PROCEDURE — U0005 INFEC AGEN DETEC AMPLI PROBE: HCPCS

## 2021-04-09 PROCEDURE — 700117 HCHG RX CONTRAST REV CODE 255: Performed by: INTERNAL MEDICINE

## 2021-04-09 PROCEDURE — 160002 HCHG RECOVERY MINUTES (STAT): Performed by: SURGERY

## 2021-04-09 PROCEDURE — A9270 NON-COVERED ITEM OR SERVICE: HCPCS | Performed by: STUDENT IN AN ORGANIZED HEALTH CARE EDUCATION/TRAINING PROGRAM

## 2021-04-09 PROCEDURE — C1788 PORT, INDWELLING, IMP: HCPCS | Performed by: SURGERY

## 2021-04-09 PROCEDURE — 110371 HCHG SHELL REV 272: Performed by: SURGERY

## 2021-04-09 PROCEDURE — 99239 HOSP IP/OBS DSCHRG MGMT >30: CPT | Performed by: HOSPITALIST

## 2021-04-09 PROCEDURE — 160028 HCHG SURGERY MINUTES - 1ST 30 MINS LEVEL 3: Performed by: SURGERY

## 2021-04-09 PROCEDURE — 160039 HCHG SURGERY MINUTES - EA ADDL 1 MIN LEVEL 3: Performed by: SURGERY

## 2021-04-09 PROCEDURE — 160035 HCHG PACU - 1ST 60 MINS PHASE I: Performed by: SURGERY

## 2021-04-09 PROCEDURE — 700105 HCHG RX REV CODE 258: Performed by: SURGERY

## 2021-04-09 PROCEDURE — 501838 HCHG SUTURE GENERAL: Performed by: SURGERY

## 2021-04-09 PROCEDURE — 77001 FLUOROGUIDE FOR VEIN DEVICE: CPT | Mod: 26 | Performed by: SURGERY

## 2021-04-09 PROCEDURE — 96372 THER/PROPH/DIAG INJ SC/IM: CPT | Mod: XU

## 2021-04-09 PROCEDURE — C1769 GUIDE WIRE: HCPCS | Performed by: SURGERY

## 2021-04-09 PROCEDURE — U0003 INFECTIOUS AGENT DETECTION BY NUCLEIC ACID (DNA OR RNA); SEVERE ACUTE RESPIRATORY SYNDROME CORONAVIRUS 2 (SARS-COV-2) (CORONAVIRUS DISEASE [COVID-19]), AMPLIFIED PROBE TECHNIQUE, MAKING USE OF HIGH THROUGHPUT TECHNOLOGIES AS DESCRIBED BY CMS-2020-01-R: HCPCS

## 2021-04-09 PROCEDURE — 160009 HCHG ANES TIME/MIN: Performed by: SURGERY

## 2021-04-09 PROCEDURE — 82962 GLUCOSE BLOOD TEST: CPT

## 2021-04-09 PROCEDURE — 700111 HCHG RX REV CODE 636 W/ 250 OVERRIDE (IP): Performed by: ANESTHESIOLOGY

## 2021-04-09 PROCEDURE — 700101 HCHG RX REV CODE 250: Performed by: ANESTHESIOLOGY

## 2021-04-09 PROCEDURE — 93005 ELECTROCARDIOGRAM TRACING: CPT | Performed by: INTERNAL MEDICINE

## 2021-04-09 PROCEDURE — C1876 STENT, NON-COA/NON-COV W/DEL: HCPCS | Performed by: SURGERY

## 2021-04-09 PROCEDURE — 160048 HCHG OR STATISTICAL LEVEL 1-5: Performed by: SURGERY

## 2021-04-09 PROCEDURE — 700102 HCHG RX REV CODE 250 W/ 637 OVERRIDE(OP): Performed by: STUDENT IN AN ORGANIZED HEALTH CARE EDUCATION/TRAINING PROGRAM

## 2021-04-09 PROCEDURE — 93010 ELECTROCARDIOGRAM REPORT: CPT | Performed by: STUDENT IN AN ORGANIZED HEALTH CARE EDUCATION/TRAINING PROGRAM

## 2021-04-09 PROCEDURE — G0378 HOSPITAL OBSERVATION PER HR: HCPCS

## 2021-04-09 PROCEDURE — 700111 HCHG RX REV CODE 636 W/ 250 OVERRIDE (IP): Performed by: SURGERY

## 2021-04-09 PROCEDURE — 36561 INSERT TUNNELED CV CATH: CPT | Mod: RT | Performed by: SURGERY

## 2021-04-09 PROCEDURE — 99217 PR OBSERVATION CARE DISCHARGE: CPT | Performed by: HOSPITALIST

## 2021-04-09 DEVICE — POWER PORTMRI COMPATABLE: Type: IMPLANTABLE DEVICE | Site: CHEST | Status: FUNCTIONAL

## 2021-04-09 DEVICE — STENT BILIARY WALLFLEX 10-40: Type: IMPLANTABLE DEVICE | Site: BILE DUCT | Status: FUNCTIONAL

## 2021-04-09 RX ORDER — MIDAZOLAM HYDROCHLORIDE 1 MG/ML
1 INJECTION INTRAMUSCULAR; INTRAVENOUS
Status: DISCONTINUED | OUTPATIENT
Start: 2021-04-09 | End: 2021-04-09 | Stop reason: HOSPADM

## 2021-04-09 RX ORDER — SODIUM CHLORIDE, SODIUM LACTATE, POTASSIUM CHLORIDE, CALCIUM CHLORIDE 600; 310; 30; 20 MG/100ML; MG/100ML; MG/100ML; MG/100ML
INJECTION, SOLUTION INTRAVENOUS CONTINUOUS
Status: DISCONTINUED | OUTPATIENT
Start: 2021-04-09 | End: 2021-04-09 | Stop reason: HOSPADM

## 2021-04-09 RX ORDER — LIDOCAINE HYDROCHLORIDE 20 MG/ML
INJECTION, SOLUTION EPIDURAL; INFILTRATION; INTRACAUDAL; PERINEURAL PRN
Status: DISCONTINUED | OUTPATIENT
Start: 2021-04-09 | End: 2021-04-09 | Stop reason: SURG

## 2021-04-09 RX ORDER — BUPIVACAINE HYDROCHLORIDE AND EPINEPHRINE 5; 5 MG/ML; UG/ML
INJECTION, SOLUTION EPIDURAL; INTRACAUDAL; PERINEURAL
Status: DISCONTINUED | OUTPATIENT
Start: 2021-04-09 | End: 2021-04-09 | Stop reason: HOSPADM

## 2021-04-09 RX ORDER — CEFAZOLIN SODIUM 1 G/3ML
INJECTION, POWDER, FOR SOLUTION INTRAMUSCULAR; INTRAVENOUS PRN
Status: DISCONTINUED | OUTPATIENT
Start: 2021-04-09 | End: 2021-04-09 | Stop reason: SURG

## 2021-04-09 RX ORDER — MEPERIDINE HYDROCHLORIDE 25 MG/ML
25 INJECTION INTRAMUSCULAR; INTRAVENOUS; SUBCUTANEOUS
Status: DISCONTINUED | OUTPATIENT
Start: 2021-04-09 | End: 2021-04-09 | Stop reason: HOSPADM

## 2021-04-09 RX ORDER — ONDANSETRON 2 MG/ML
INJECTION INTRAMUSCULAR; INTRAVENOUS PRN
Status: DISCONTINUED | OUTPATIENT
Start: 2021-04-09 | End: 2021-04-09 | Stop reason: SURG

## 2021-04-09 RX ORDER — SUCCINYLCHOLINE/SOD CL,ISO/PF 200MG/10ML
SYRINGE (ML) INTRAVENOUS PRN
Status: DISCONTINUED | OUTPATIENT
Start: 2021-04-09 | End: 2021-04-09 | Stop reason: SURG

## 2021-04-09 RX ORDER — LIDOCAINE HYDROCHLORIDE 40 MG/ML
SOLUTION TOPICAL PRN
Status: DISCONTINUED | OUTPATIENT
Start: 2021-04-09 | End: 2021-04-09 | Stop reason: SURG

## 2021-04-09 RX ORDER — SODIUM CHLORIDE, SODIUM LACTATE, POTASSIUM CHLORIDE, CALCIUM CHLORIDE 600; 310; 30; 20 MG/100ML; MG/100ML; MG/100ML; MG/100ML
INJECTION, SOLUTION INTRAVENOUS
Status: DISCONTINUED | OUTPATIENT
Start: 2021-04-09 | End: 2021-04-09 | Stop reason: SURG

## 2021-04-09 RX ORDER — OXYCODONE HCL 5 MG/5 ML
10 SOLUTION, ORAL ORAL
Status: DISCONTINUED | OUTPATIENT
Start: 2021-04-09 | End: 2021-04-09 | Stop reason: HOSPADM

## 2021-04-09 RX ORDER — ONDANSETRON 2 MG/ML
4 INJECTION INTRAMUSCULAR; INTRAVENOUS
Status: DISCONTINUED | OUTPATIENT
Start: 2021-04-09 | End: 2021-04-09 | Stop reason: HOSPADM

## 2021-04-09 RX ORDER — HALOPERIDOL 5 MG/ML
1 INJECTION INTRAMUSCULAR
Status: DISCONTINUED | OUTPATIENT
Start: 2021-04-09 | End: 2021-04-09 | Stop reason: HOSPADM

## 2021-04-09 RX ORDER — OXYCODONE HCL 5 MG/5 ML
5 SOLUTION, ORAL ORAL
Status: DISCONTINUED | OUTPATIENT
Start: 2021-04-09 | End: 2021-04-09 | Stop reason: HOSPADM

## 2021-04-09 RX ORDER — DIPHENHYDRAMINE HYDROCHLORIDE 50 MG/ML
12.5 INJECTION INTRAMUSCULAR; INTRAVENOUS
Status: DISCONTINUED | OUTPATIENT
Start: 2021-04-09 | End: 2021-04-09 | Stop reason: HOSPADM

## 2021-04-09 RX ADMIN — ONDANSETRON 4 MG: 2 INJECTION INTRAMUSCULAR; INTRAVENOUS at 10:53

## 2021-04-09 RX ADMIN — SODIUM CHLORIDE, POTASSIUM CHLORIDE, SODIUM LACTATE AND CALCIUM CHLORIDE: 600; 310; 30; 20 INJECTION, SOLUTION INTRAVENOUS at 10:06

## 2021-04-09 RX ADMIN — GABAPENTIN 300 MG: 300 CAPSULE ORAL at 13:31

## 2021-04-09 RX ADMIN — Medication 5 MG: at 10:09

## 2021-04-09 RX ADMIN — ALFENTANIL HYDROCHLORIDE 750 MCG: 500 INJECTION INTRAVENOUS at 10:09

## 2021-04-09 RX ADMIN — Medication 140 MG: at 10:10

## 2021-04-09 RX ADMIN — LIDOCAINE HYDROCHLORIDE 20 MG: 20 INJECTION, SOLUTION EPIDURAL; INFILTRATION; INTRACAUDAL at 10:09

## 2021-04-09 RX ADMIN — INSULIN HUMAN 2 UNITS: 100 INJECTION, SOLUTION PARENTERAL at 13:29

## 2021-04-09 RX ADMIN — FINASTERIDE 5 MG: 5 TABLET, FILM COATED ORAL at 05:06

## 2021-04-09 RX ADMIN — PROPOFOL 40 MG: 10 INJECTION, EMULSION INTRAVENOUS at 10:10

## 2021-04-09 RX ADMIN — ALLOPURINOL 500 MG: 100 TABLET ORAL at 05:06

## 2021-04-09 RX ADMIN — Medication 40 MG: at 10:41

## 2021-04-09 RX ADMIN — GABAPENTIN 300 MG: 300 CAPSULE ORAL at 05:06

## 2021-04-09 RX ADMIN — LIDOCAINE HYDROCHLORIDE 160 MG: 40 SOLUTION TOPICAL at 10:12

## 2021-04-09 RX ADMIN — INSULIN HUMAN 2 UNITS: 100 INJECTION, SOLUTION PARENTERAL at 05:11

## 2021-04-09 RX ADMIN — PROPOFOL 20 MG: 10 INJECTION, EMULSION INTRAVENOUS at 10:09

## 2021-04-09 RX ADMIN — OMEPRAZOLE 40 MG: 20 CAPSULE, DELAYED RELEASE ORAL at 05:06

## 2021-04-09 RX ADMIN — CEFAZOLIN 2 G: 330 INJECTION, POWDER, FOR SOLUTION INTRAMUSCULAR; INTRAVENOUS at 10:06

## 2021-04-09 RX ADMIN — ALFENTANIL HYDROCHLORIDE 250 MCG: 500 INJECTION INTRAVENOUS at 10:40

## 2021-04-09 RX ADMIN — TAMSULOSIN HYDROCHLORIDE 0.4 MG: 0.4 CAPSULE ORAL at 05:06

## 2021-04-09 ASSESSMENT — ENCOUNTER SYMPTOMS
DIAPHORESIS: 0
ABDOMINAL PAIN: 1
NERVOUS/ANXIOUS: 0
VOMITING: 0
DEPRESSION: 0
TINGLING: 0
NECK PAIN: 0
PALPITATIONS: 0
BLOOD IN STOOL: 0
WEIGHT LOSS: 1
TREMORS: 0
COUGH: 0
SHORTNESS OF BREATH: 0
FOCAL WEAKNESS: 0

## 2021-04-09 ASSESSMENT — PAIN SCALES - GENERAL: PAIN_LEVEL: 0

## 2021-04-09 ASSESSMENT — PAIN DESCRIPTION - PAIN TYPE
TYPE: SURGICAL PAIN
TYPE: SURGICAL PAIN

## 2021-04-09 NOTE — DISCHARGE SUMMARY
I copy and pasted this discharge summary from different encounter and by Dr. Barboza  Discharge Summary     CHIEF COMPLAINT ON ADMISSION  jaundice     Reason for Admission   Chief Complaint  75 y.o. male admitted 4/7/2021 with jaundice     Hospital Course  75M with recent diagnosis of pancreatic head cancer diabetes hemorrhagic disorder and urinary bladder disorder presents with jaundice sent from outpatient physician for evaluation.  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 Moore.  The patient's daughter states they were told to present to the ED for ERCP placement.  Patient is complaining of abdominal pain loss of appetite and difficulty passing gas. Denies sob, cough, chest pain/pressure, extremity swelling, diaphoresis, fever, chills, nausea, vomiting, hemoptysis, diarrhea, headaches, dysuria/dyspareunia, polyuria, or polydipsia.      Upon admission no white count elevated glucose of 224 elevated AST of 129 elevated ALT 69  1T bili 6.3 and mild anemia hemoglobin 12.2 macrocytic.  ED Dr. Huff has consulted GI consultants with Dr. Calderon who took the call and agreed to evaluate the patient.  Will admit patient for jaundice and elevated bilirubin keep n.p.o. with anticipated ERCP tomorrow morning     Interval Problem Update  Patient seen this morning appears to be jaundice otherwise doing okay , decreased appetite for the past few months.  He is complaining of some abdominal pain which is mild nonradiating nonspecific.  Notes and labs have been reviewed by me deep detail patient will likely get a ERCP by GI later today.     Patient actually underwent ERCP on 4/9/2021.  Please see full procedure note for detail.  At this time patient was tolerating his diet and GI recommended follow-up outpatient.  PROCEDURE:  Placement of right cephalic cutdown PowerPort.     Procedure(s):  INSERTION, CATHETER - CEPHALIC POWER PORT - Wound  Class: Clean  ERCP (ENDOSCOPIC RETROGRADE CHOLANGIOPANCREATOGRAPHY) - Wound Class: Clean Contaminated  ERCP,WITH CALCULUS REMOVAL FROM BILE OR PANCREATIC DUCT - Wound Class: Clean Contaminated        ERCP with:      Bile duct stent removal                          Bile duct stone extraction                          Bile duct metal stent placement                          Radiologic interpretation of static and dynamic fluoroscopic images     Indication:        Pancreas cancer with biliary obstruction     Sedation:         GA (Mock)     Findings:                          Ampulla                                      Post sphincterotomy anatomy                                      Bile duct stent in place - removed with snare                          Pancreatic duct                                      Neither injected nor cannulated                          CBD                                      2cm stricture in head of pancreas                                      Stones / debris in proximal duct                 Therapeutics                          Bile duct stent removal with snare                          Bile duct balloon stone and sludge extraction                          Bile duct uncovered metal stent placement - 10mm x 40mm     Plan:                Follow liver enzymes                          Plan per Dr. Quintanilla and oncology     She denies any current complaint on the day of discharge is tolerating a diet well I discussed the case in detail with Dr. giraldo and Dr. Petty  and they are okay with discharging patient and having him follow-up outpatient.  Patient understood and agreed with above plan           Jaundice- (present on admission)  Assessment & Plan  In setting of pancreatic head cancer s/p biliary stent placement in recent past.  Likely obstructive Jaundice  GI consulted: ERCP   keep npo   Rocephin/Flagyl ordered 1 dose.     DM (diabetes mellitus) (HCC)- (present on  admission)  Assessment & Plan  a1c  Glucose in 200's upon admission.  ISS while NPO  Home med: Metformin not ordered.     Abdominal pain- (present on admission)  Assessment & Plan  Continue Home Percocet and tramadol        Malignant neoplasm of head of pancreas (HCC)- (present on admission)  Assessment & Plan  Insetting of biliary stent placement in the recent past     Hemorrhagic disorder (HCC)- (present on admission)  Assessment & Plan  chronic     Hypertension- (present on admission)  Assessment & Plan  Continue home medication valsartan and monitor.     BPH (benign prostatic hyperplasia)- (present on admission)  Assessment & Plan  Cont. Tamsulosin/finasteride home med.              Admission Date  4/7/21     CODE STATUS  Prior     HPI & HOSPITAL COURSE     Therefore, he is discharged in good and stable condition to home with close outpatient follow-up.     The patient met 2-midnight criteria for an inpatient stay at the time of discharge.     Discharge Date  4/9/21     FOLLOW UP ITEMS POST DISCHARGE  pcp  Gi  surgery     DISCHARGE DIAGNOSES  Active Problems:    * No active hospital problems. *  Resolved Problems:    * No resolved hospital problems. *        FOLLOW UP  No future appointments.  No follow-up provider specified.     MEDICATIONS ON DISCHARGE      Medication List       Notice    Cannot display discharge medications because the patient has not yet been admitted.             CONTINUE taking these medications      Instructions   ALLOPURINOL PO    Take 500 mg by mouth every day.  Dose: 500 mg      amoxicillin-clavulanate 875-125 MG Tabs  Commonly known as: AUGMENTIN    Take 1 tablet by mouth 2 times a day.  Dose: 1 tablet      atorvastatin 10 MG Tabs  Commonly known as: LIPITOR    Take 10 mg by mouth every evening.  Dose: 10 mg      cyanocobalamin 1000 MCG Tabs  Commonly known as: VITAMIN B12    Take  by mouth every day.      FINASTERIDE PO    Take  by mouth.      gabapentin 300 MG Caps  Commonly known  as: NEURONTIN    Take 300 mg by mouth 3 times a day.  Dose: 300 mg      metFORMIN 500 MG Tabs  Commonly known as: GLUCOPHAGE    Take 500 mg by mouth 2 times a day.  Dose: 500 mg      omeprazole 40 MG delayed-release capsule  Commonly known as: PRILOSEC    Take 40 mg by mouth every day.  Dose: 40 mg      oxyCODONE-acetaminophen 5-325 MG Tabs  Commonly known as: PERCOCET    Take 2 Tablets by mouth every four hours as needed.  Dose: 2 tablet      sulfamethoxazole-trimethoprim 800-160 MG tablet  Commonly known as: BACTRIM DS    Take 1 tablet by mouth 2 times a day.  Dose: 1 tablet      tamsulosin 0.4 MG capsule  Commonly known as: FLOMAX    Take 0.8 mg by mouth every day.  Dose: 0.8 mg      valsartan 160 MG Tabs  Commonly known as: DIOVAN    Take 160 mg by mouth every day.  Dose: 160 mg      vitamin D 1000 UNIT Tabs  Commonly known as: VITAMIND D3    Take 1,000 Units by mouth every day.  Dose: 1,000 Units                Allergies       Allergies   Allergen Reactions   • Morphine Nausea         DIET  No orders of the defined types were placed in this encounter.        ACTIVITY  As tolerated.  Weight bearing as tolerated     CONSULTATIONS  Gi  surgery     PROCEDURES      Procedure(s):  INSERTION, CATHETER - CEPHALIC POWER PORT - Wound Class: Clean  ERCP (ENDOSCOPIC RETROGRADE CHOLANGIOPANCREATOGRAPHY) - Wound Class: Clean Contaminated  ERCP,WITH CALCULUS REMOVAL FROM BILE OR PANCREATIC DUCT - Wound Class: Clean Contaminated     Surgeon(s):  SPRING Lai M.D.     Anesthesiologist/Type of Anesthesia:  Anesthesiologist: Larry Torres M.D./General     Surgical Staff:  Circulator: Slime Meza R.N.  Endoscopy Technician: Syl Barrientos  Scrub [facility]: [facility]  Radiology Technologist: Mickey Garsia  Endoscopy Nurse: Kathy Rivera R.N.     Specimens removed if any:  * No specimens in log *     Dr. Gaines  [facility]  [facility]  [phone]     ERCP with:      Bile  duct stent removal                          Bile duct stone extraction                          Bile duct metal stent placement                          Radiologic interpretation of static and dynamic fluoroscopic images     Indication:        Pancreas cancer with biliary obstruction     Sedation:         GA (Mock)     Findings:                          Ampulla                                      Post sphincterotomy anatomy                                      Bile duct stent in place - removed with snare                          Pancreatic duct                                      Neither injected nor cannulated                          CBD                                      2cm stricture in head of pancreas                                      Stones / debris in proximal duct                 Therapeutics                          Bile duct stent removal with snare                          Bile duct balloon stone and sludge extraction                          Bile duct uncovered metal stent placement - 10mm x 40mm     Plan:                Follow liver enzymes                          Plan per Dr. Quintanilla and oncology                          Patient may benefit from EUS with celiac block if pain continues           LABORATORY        Lab Results   Component Value Date     SODIUM 134 (L) 04/08/2021     POTASSIUM 3.6 04/08/2021     CHLORIDE 100 04/08/2021     CO2 23 04/08/2021     GLUCOSE 211 (H) 04/08/2021     BUN 6 (L) 04/08/2021     CREATININE 0.30 (L) 04/08/2021               Lab Results   Component Value Date     WBC 6.4 04/08/2021     HEMOGLOBIN 11.8 (L) 04/08/2021     HEMATOCRIT 34.9 (L) 04/08/2021     PLATELETCT 247 04/08/2021                 I did not provide care to this patient or evaluate this patient.  I signed this note to complete the patient chart as Dr. Barboza is no longer working here in [facility].

## 2021-04-09 NOTE — PROGRESS NOTES
A&Ox4, on RA. Reports mild discomfort to back from laying in bed, pt encouraged to reposition often. Page in place, CDI. Pt is NPO at this time. All comfort measures in place. Call light and personal belongings by bedside. Bed locked and in lowest position. Hourly rounding in place.

## 2021-04-09 NOTE — CARE PLAN
Problem: Pain Management  Goal: Pain level will decrease to patient's comfort goal  Outcome: PROGRESSING AS EXPECTED  Intervention: Follow pain managment plan developed in collaboration with patient and Interdisciplinary Team  Note: Denies any pain.      Problem: Knowledge Deficit  Goal: Knowledge of disease process/condition, treatment plan, diagnostic tests, and medications will improve  Outcome: PROGRESSING AS EXPECTED  Intervention: Explain information regarding disease process/condition, treatment plan, diagnostic tests, and medications and document in education  Note: POC and medications explained to patient. All questions answered.

## 2021-04-09 NOTE — ANESTHESIA POSTPROCEDURE EVALUATION
Patient: Zeus Rodríguez    Procedure Summary     Date: 04/09/21 Room / Location: [place] [address] / SURGERY [facility]    Anesthesia Start: 1006 Anesthesia Stop: 1102    Procedures:       INSERTION, CATHETER - CEPHALIC POWER PORT (Right Chest)      ERCP (ENDOSCOPIC RETROGRADE CHOLANGIOPANCREATOGRAPHY) (N/A Abdomen) Diagnosis: (PANCREATIC CANCER)    Surgeons: Hernan Quintanilla M.D.; Peter Gaines M.D. Responsible Provider: Larry Torres M.D.    Anesthesia Type: general ASA Status: 3          Final Anesthesia Type: general  Last vitals  BP   Blood Pressure : 146/77    Temp   35.8 °C (96.5 °F)    Pulse   70   Resp   16    SpO2   95 %      Anesthesia Post Evaluation    Patient location during evaluation: PACU  Patient participation: complete - patient participated  Level of consciousness: awake and alert  Pain score: 0    Airway patency: patent  Anesthetic complications: no  Cardiovascular status: hemodynamically stable  Respiratory status: acceptable  Hydration status: euvolemic    PONV: none          No complications documented.     Nurse Pain Score: 0 (NPRS)

## 2021-04-09 NOTE — ANESTHESIA PREPROCEDURE EVALUATION
Relevant Problems   CARDIAC   (+) Hypertension       Physical Exam    Airway   Mallampati: II  TM distance: >3 FB  Neck ROM: full       Cardiovascular - normal exam  Rhythm: regular  Rate: normal  (-) murmur     Dental - normal exam           Pulmonary - normal exam  Breath sounds clear to auscultation  (+) decreased breath sounds     Abdominal    Neurological - normal exam                 Anesthesia Plan    ASA 3   ASA physical status 3 criteria: COPD and diabetes - poorly controlled    Plan - general       Airway plan will be ETT          Induction: intravenous    Postoperative Plan: Postoperative administration of opioids is intended.    Pertinent diagnostic labs and testing reviewed    Informed Consent:    Anesthetic plan and risks discussed with patient.    Use of blood products discussed with: patient whom consented to blood products.

## 2021-04-09 NOTE — OR SURGEON
Immediate Post OP Note    PreOp Diagnosis: Biliary obstruction    PostOp Diagnosis: Same    Procedure(s):  INSERTION, CATHETER - CEPHALIC POWER PORT - Wound Class: Clean  ERCP (ENDOSCOPIC RETROGRADE CHOLANGIOPANCREATOGRAPHY) - Wound Class: Clean Contaminated  ERCP,WITH CALCULUS REMOVAL FROM BILE OR PANCREATIC DUCT - Wound Class: Clean Contaminated    Surgeon(s):  SPRING Lai M.D.    Anesthesiologist/Type of Anesthesia:  Anesthesiologist: Larry Torres M.D./General    Surgical Staff:  Circulator: Slime Meza R.N.  Endoscopy Technician: Syl Barrientos  Scrub Person: [facility]  Radiology Technologist: Mickey Garsia  Endoscopy Nurse: Kathy Rivera R.N.    Specimens removed if any:  * No specimens in log *    Dr. Gaines  [facility]  [place] [facility]  [phone]    ERCP with: Bile duct stent removal    Bile duct stone extraction    Bile duct metal stent placement    Radiologic interpretation of static and dynamic fluoroscopic images    Indication: Pancreas cancer with biliary obstruction    Sedation: GA (Mock)    Findings:    Ampulla     Post sphincterotomy anatomy     Bile duct stent in place - removed with snare    Pancreatic duct     Neither injected nor cannulated    CBD     2cm stricture in head of pancreas     Stones / debris in proximal duct     Therapeutics    Bile duct stent removal with snare    Bile duct balloon stone and sludge extraction    Bile duct uncovered metal stent placement - 10mm x 40mm    Plan:  Follow liver enzymes    Plan per Dr. Quintanilla and oncology    Patient may benefit from EUS with celiac block if pain continues      4/9/2021 11:47 AM Peter Gaines M.D.

## 2021-04-09 NOTE — PROGRESS NOTES
GI ATTENDING:    Patient seen and examined.  Chart reviewed.  ERCP  explained at length.    Patient requests to proceed with ERCP.    Consenting person was given an opportunity to ask questions and discuss other options.  Risks including but not limited to pancreatitis, contrast reaction, stent migration and/or occlusion, perforation, infection, bleeding, missed lesion(s), cardiac and/or pulmonary event, aspiration, stroke, possible need for surgery and/or interventional radiology, hospitalization possibly prolonged, discomfort, unsuccessful and/or incomplete procedure, indefinite diagnosis, ineffective therapy and/or persistent symptoms, possible need for repeat procedures and/or additional testings, damage to adjacent organs and/or vascular structures, medication reaction, disability, death, and other adverse events possibly life-threatening.  Discussion was undertaken with Layman's terms.  Consenting person stated understanding and acceptance of these risks, and wished to proceed.  Informed consent was given in clear state of mind.

## 2021-04-09 NOTE — PROCEDURES
DATE OF PROCEDURE:  04/09/2021     PROCEDURES:  Endoscopic retrograde cholangiopancreatography with:  1.  Bile duct stent removal.  2.  Bile duct stone extraction.  3.  Bile duct metal stent placement.  4.  Radiologic interpretation of static and dynamic fluoroscopic images by   myself at no time was a radiologist present in the room.     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     FINAL IMPRESSION:  1.  Biliary ampulla post-sphincterotomy anatomy.  2.  Bile duct stent in place, removed with snare.  3.  Pancreatic duct neither injected nor cannulated.  4.  Common bile duct 2 cm stricture in the head of the pancreas consistent   with known malignancy.  Stones and debris in the proximal duct.     THERAPEUTICS:  1.  Bile duct stent removal with snare.  2.  Bile duct balloon stone extraction with 9-12 mm balloon.  3.  Bile duct uncovered metal stent placement with [employer] 10x40 mm   self-expanding metal stent.     RECOMMENDATIONS:  1.  Follow liver enzymes.  2.  Plan per Dr. Quintanilla, oncology.  3.  The patient may benefit from endoscopic ultrasound with celiac block if   pain continues.     Of note, this procedure was done in conjunction and immediately following   PowerPort placement by Dr. Quintanilla.     PROCEDURE:  Prior to the procedure, physical exam was stable.  During   procedure, vital signs remained within normal limits.  Prior to sedation,   informed consent was obtained.  Risks, benefits, alternatives including, but   not limited to risk of bleeding, infection, perforation, adverse reaction to   medication, failure to identify pathology, pancreatitis and death explained to   the patient who accepted all risks.  The patient was intubated and sedated   and prepped in the supine position.  Of note, prior to my procedure, Dr. Quintanilla placed a port into the right brachiocephalic vein and this was   uncomplicated.  The patient was left supine for  my procedure.     Scope tip of the Olympus flexible side-viewing TJF duodenoscope passed to the   level of the biliary ampulla in the short position.  The biliary ampulla had   post-sphincterotomy anatomy and there was a plastic bile duct stent in place.    This was snared at the scope tip and removed through the scope channel.  Of   note, there was an 11 Belgian stent and difficult to pull through the scope   channel.  A 9-12 mm balloon was then exchanged down the scope channel and the   common bile duct was freely cannulated on the first attempt with the wire,   which passed along the expected course of the intrahepatic biliary tree.  This   was followed with the catheter and injection of contrast was made showing a 2   cm stricture consistent with the patient's known malignancy.  This was in the   head of the pancreas.  Upstream from this was multiple filling defects   consistent with stones and sludge.  The wire was left in place and the balloon   catheter passed to the common hepatic duct, inflated to 12 mm withdrawn down   the duct with delivery of soft stone debris, soft stones and copious sludge.    Repeat sweeps were made until this is greatly diminished.  The wire was left   in place.  The balloon was removed and a 40x10 mm [employer] uncovered   metal biliary stent was chosen and deployed over the wire in the usual fashion   utilizing both fluoroscopic and visual markings.  The stent deployed fully   and there was further flow of sludge debris and dark bile and excellent flow.    Stent was determined to be in good position.  The procedure was deemed   complete.  The scope was withdrawn.  Air and liquid were suctioned.  The   patient tolerated the procedure well and was sent to recovery without   immediate complications.        ______________________________  MD MELANIE CARRERA/KRISTA    DD:  04/09/2021 11:56  DT:  04/09/2021 12:55    Job#:  [id number]

## 2021-04-09 NOTE — CARE PLAN
Problem: Pain Management  Goal: Pain level will decrease to patient's comfort goal  Outcome: MET     Problem: Fluid Volume:  Goal: Will maintain balanced intake and output  Outcome: MET     Problem: Communication  Goal: The ability to communicate needs accurately and effectively will improve  Outcome: MET     Problem: Safety  Goal: Will remain free from injury  Outcome: MET  Goal: Will remain free from falls  Outcome: MET     Problem: Infection  Goal: Will remain free from infection  Outcome: MET     Problem: Venous Thromboembolism (VTW)/Deep Vein Thrombosis (DVT) Prevention:  Goal: Patient will participate in Venous Thrombosis (VTE)/Deep Vein Thrombosis (DVT)Prevention Measures  Outcome: MET     Problem: Bowel/Gastric:  Goal: Normal bowel function is maintained or improved  Outcome: MET  Goal: Will not experience complications related to bowel motility  Outcome: MET     Problem: Knowledge Deficit  Goal: Knowledge of disease process/condition, treatment plan, diagnostic tests, and medications will improve  Outcome: MET  Goal: Knowledge of the prescribed therapeutic regimen will improve  Outcome: MET     Problem: Discharge Barriers/Planning  Goal: Patient's continuum of care needs will be met  Outcome: MET

## 2021-04-09 NOTE — ANESTHESIA TIME REPORT
Anesthesia Start and Stop Event Times     Date Time Event    4/9/2021 0902 Ready for Procedure     1006 Anesthesia Start     1102 Anesthesia Stop        Responsible Staff  04/09/21    Name Role Begin End    Larry Torres M.D. Anesth 1006 1102        Preop Diagnosis (Free Text):  Pre-op Diagnosis     PANCREATIC CANCER        Preop Diagnosis (Codes):    Post op Diagnosis  Pancreatic cancer (HCC)      Premium Reason  Non-Premium    Comments:

## 2021-04-09 NOTE — OP REPORT
DATE OF SERVICE:  04/09/2021     INDICATIONS:  The patient is 75-year-old male patient known to me who is   status post diagnosis of pancreatic head tumor, came in through the emergency   room with jaundice.  He was in need of neoadjuvant chemotherapy, also   replacement with a metal stent, so Dr. Gaines and myself proceeded with   placing a PowerPort first followed by ERCP with metal stent placement.     SURGEON:  Hernan Quintanilla MD     ASSISTANT:  None.     ANESTHESIOLOGIST:  Larry Torres MD     ANESTHESIA:  General and local anesthetic.     ESTIMATED BLOOD LOSS:  5 mL.     COMPLICATIONS:  None.     FINDINGS:  Fluoroscopically placed catheter to the superior vena cava atrial   junction with good inflow and outflow x3.     PREOPERATIVE DIAGNOSIS:  Pancreatic head tumor.     POSTOPERATIVE DIAGNOSIS:  Pancreatic head tumor with obstructive jaundice.     PROCEDURE:  Placement of right cephalic cutdown PowerPort.     CASE CLASS:  Clean.     DESCRIPTION OF PROCEDURE:  The patient was brought to OR and placed under   general anesthetic with both arms tucked, covered with sterile drapes after   being prepped with chlorhexidine prep.  A timeout was done, which was   adequate.  At that point, using 5 mL of 0.5% Marcaine with epinephrine in the   right deltopectoral groove, an incision was made with a 10 blade and Bovie   electrocautery.  We encountered the deltopectoral groove, got control of   moderate sized cephalic vein with 3-0 Vicryl.  The distal was tied and   proximal was elevated.  Small venotomy was created and an 8-Senegalese flushed   PowerPort catheter was inserted under fluoroscopic guidance to the superior   vena cava atrial junction with good inflow and outflow.  Once in its final   position, the proximal tie was clamped with a rubber shod, cut and attached to   a flushed PowerPort, unclamped, tested for a second time and having great   inflow and outflow.  At that point, he was placed in reverse  Trendelenburg.  A   pocket was created along the pec fascia.  It was secured to the pec fascia at   4 points using 2-0 Prolene, tested for a final time in its final position and   had great inflow and outflow.  At that point, we gave a total of 30 mL of   0.5% Marcaine with epinephrine around the pec fascia, muscle, soft tissue and   skin.  We then closed the soft tissue with interrupted 3-0 Vicryl.  Skin was   closed using 4-0 Monocryl.  Mastisol and Steri-Strips were applied.  He was   then extubated and transferred to recovery room.        ______________________________  MD RODOLFO Quinonez/KRISTA    DD:  04/09/2021 10:47  DT:  04/09/2021 11:41    Job#:  [id number]

## 2021-04-09 NOTE — OR NURSING
1103: Pt arrives to PACU awake and calm. VSS. Dressing to right chest is CDI. Xray completed and line placement was confirmed with fluoride per OR RN Kathy. Both hearing aids and upper dentures are in.    1115: Pt sitting up in the gurney tolerating sips of water. Denies pain or nausea.    1145: Pt continues to deny pain or nausea. Report was called to Win GUIDO. Dressing CDI upon leaving PACU.    1158: Pt going to his room with transport on 2L n.c O2 tank full and connected to pt.

## 2021-04-09 NOTE — PROGRESS NOTES
[facility] Medicine Daily Progress Note    Date of Service  4/9/2021    Chief Complaint  75 y.o. male admitted 4/7/2021 with jaundice    Hospital Course  75M with recent diagnosis of pancreatic head cancer diabetes hemorrhagic disorder and urinary bladder disorder presents with jaundice sent from outpatient physician for evaluation.  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 Moore.  The patient's daughter states they were told to present to the ED for ERCP placement.  Patient is complaining of abdominal pain loss of appetite and difficulty passing gas. Denies sob, cough, chest pain/pressure, extremity swelling, diaphoresis, fever, chills, nausea, vomiting, hemoptysis, diarrhea, headaches, dysuria/dyspareunia, polyuria, or polydipsia.      Upon admission no white count elevated glucose of 224 elevated AST of 129 elevated ALT 69  1T bili 6.3 and mild anemia hemoglobin 12.2 macrocytic.  ED Dr. Huff has consulted GI consultants with Dr. Calderon who took the call and agreed to evaluate the patient.  Will admit patient for jaundice and elevated bilirubin keep n.p.o. with anticipated ERCP tomorrow morning    Interval Problem Update  Patient seen this morning appears to be jaundice otherwise doing okay , decreased appetite for the past few months.  He is complaining of some abdominal pain which is mild nonradiating nonspecific.  Notes and labs have been reviewed by me deep detail patient will likely get a ERCP by GI later today.    4/9: patient seen after ERCP, tolerated procedure well. Vitals stable  Will need to control pain  GI to fu with plan  Fu LFTs pending  Start diet    Patient's wife is in the room with him and both of them are frustrated because they have not spoken to a GI physician after the procedure.  They are also frustrated that he has to share room with another patient and he has to share a bathroom in the sink.  He says  this is unacceptable and hospital should not be doing this.  I explained to the patient in detail that unfortunately we are very busy hospital and we like to save individual rooms for isolated patients.  Patient would like to go home however I discussed with him that according to Dr. Gaines's note it says that plan will be according to Dr. perez and the oncologist.  I have a text out to Dr. Ascencio to discuss this in detail and to address patient's concerns.      Consultants/Specialty  GI    Code Status  Full Code    Disposition  Pending  Gi recs  Review of Systems  Review of Systems   Constitutional: Positive for malaise/fatigue and weight loss. Negative for diaphoresis.   HENT: Negative for congestion, ear discharge and tinnitus.    Respiratory: Negative for cough and shortness of breath.    Cardiovascular: Negative for chest pain, palpitations and leg swelling.   Gastrointestinal: Positive for abdominal pain. Negative for blood in stool, melena and vomiting.   Genitourinary: Negative for dysuria and frequency.   Musculoskeletal: Negative for joint pain and neck pain.   Skin: Negative for rash.   Neurological: Negative for tingling, tremors and focal weakness.   Psychiatric/Behavioral: Negative for depression and suicidal ideas. The patient is not nervous/anxious.    All other systems reviewed and are negative.       Physical Exam  Temp:  [35.8 °C (96.5 °F)-37.1 °C (98.7 °F)] 36.1 °C (97 °F)  Pulse:  [] 77  Resp:  [15-18] 15  BP: (113-150)/(67-88) 129/78  SpO2:  [93 %-97 %] 96 %    Physical Exam  Constitutional:       Appearance: Normal appearance. He is obese.      Comments: Tired, fatigue   HENT:      Head: Normocephalic and atraumatic.      Left Ear: There is no impacted cerumen.      Nose: Nose normal. No congestion.      Mouth/Throat:      Mouth: Mucous membranes are moist.      Pharynx: No oropharyngeal exudate.   Eyes:      General:         Right eye: No discharge.      Conjunctiva/sclera:  Conjunctivae normal.      Pupils: Pupils are equal, round, and reactive to light.   Cardiovascular:      Rate and Rhythm: Normal rate.      Heart sounds: No murmur. No friction rub.   Pulmonary:      Effort: Pulmonary effort is normal. No respiratory distress.      Breath sounds: Normal breath sounds. No stridor. No wheezing.   Abdominal:      General: Abdomen is flat. Bowel sounds are normal. There is no distension.      Palpations: There is no mass.      Tenderness: There is no guarding.   Musculoskeletal:         General: No swelling, tenderness or signs of injury. Normal range of motion.      Cervical back: Normal range of motion and neck supple.   Skin:     General: Skin is warm and dry.      Coloration: Skin is not jaundiced.   Neurological:      General: No focal deficit present.      Mental Status: He is alert. Mental status is at baseline. He is disoriented.      Cranial Nerves: No cranial nerve deficit.      Motor: No weakness.   Psychiatric:         Mood and Affect: Mood normal.         Behavior: Behavior normal.         Thought Content: Thought content normal.         Fluids    Intake/Output Summary (Last 24 hours) at 4/9/2021 1218  Last data filed at 4/9/2021 1101  Gross per 24 hour   Intake 1010 ml   Output 2000 ml   Net -990 ml       Laboratory  Recent Labs     04/07/21  1740 04/08/21  0120   WBC 6.5 6.4   RBC 3.66* 3.55*   HEMOGLOBIN 12.2* 11.8*   HEMATOCRIT 36.2* 34.9*   MCV 98.9* 98.3*   MCH 33.3* 33.2*   MCHC 33.7 33.8   RDW 54.0* 55.4*   PLATELETCT 256 247   MPV 12.2 12.0     Recent Labs     04/07/21  1740 04/08/21  0120   SODIUM 131* 134*   POTASSIUM 3.7 3.6   CHLORIDE 99 100   CO2 21 23   GLUCOSE 224* 211*   BUN 7* 6*   CREATININE 0.30* 0.30*   CALCIUM 9.0 8.7             Recent Labs     04/08/21  0120   TRIGLYCERIDE 158*   HDL 17*   LDL 96       Imaging  DX-PORTABLE FLUORO > 1 HOUR   Final Result      Intraoperative fluoroscopy spot images as described above.            Assessment/Plan  Jaundice- (present on admission)  Assessment & Plan  In setting of pancreatic head cancer s/p biliary stent placement in recent past.  Likely obstructive Jaundice  GI consulted:s/p ERCP with findings of  ERCP with:      Bile duct stent removal                          Bile duct stone extraction                          Bile duct metal stent placement                          Radiologic interpretation of static and dynamic fluoroscopic images     Indication:        Pancreas cancer with biliary obstruction     Sedation:         GA (Mock)     Findings:                          Ampulla                                      Post sphincterotomy anatomy                                      Bile duct stent in place - removed with snare                          Pancreatic duct                                      Neither injected nor cannulated                          CBD                                      2cm stricture in head of pancreas                                      Stones / debris in proximal duct                 Therapeutics                          Bile duct stent removal with snare                          Bile duct balloon stone and sludge extraction                          Bile duct uncovered metal stent placement - 10mm x 40mm     Plan:                Follow liver enzymes                          Plan per Dr. Quintanilla and oncology                          Patient may benefit from EUS with celiac block if pain continues       Pending further recs by GI    .    DM (diabetes mellitus) (HCC)- (present on admission)  Assessment & Plan  a1c  Glucose in 200's upon admission.  ISS   Home med: Metformin not ordered.    Abdominal pain- (present on admission)  Assessment & Plan  Continue Home Percocet and tramadol      Malignant neoplasm of head of pancreas (HCC)- (present on admission)  Assessment & Plan  Insetting of biliary stent placement in the recent past    Hemorrhagic disorder (HCC)-  (present on admission)  Assessment & Plan  chronic    Hypertension- (present on admission)  Assessment & Plan  Continue home medication valsartan and monitor.    BPH (benign prostatic hyperplasia)- (present on admission)  Assessment & Plan  Cont. Tamsulosin/finasteride home med.         VTE prophylaxis: lovenox    Discussed plan of care with patient, nursing, further plans pending per GI recs

## 2021-04-09 NOTE — PROGRESS NOTES
Discharge instructions reviewed and questions answered. PIV previously removed by bedside RN. Pt can shower but no bathes or hot tubs. Pt given supplies for dressing changes. Pt instructed to monitor for s/s infection. Pt given info on power port. Pt has f/u with oncologist Wednesday 4/14.

## 2021-04-09 NOTE — ANESTHESIA PROCEDURE NOTES
Airway    Date/Time: 4/9/2021 10:12 AM  Performed by: Larry Torres M.D.  Authorized by: Larry Torres M.D.     Location:  OR  Urgency:  Elective  Indications for Airway Management:  Anesthesia      Spontaneous Ventilation: absent    Sedation Level:  Deep  Preoxygenated: Yes    Patient Position:  Sniffing  Final Airway Type:  Endotracheal airway  Final Endotracheal Airway:  ETT  Cuffed: Yes    Technique Used for Successful ETT Placement:  Direct laryngoscopy  Devices/Methods Used in Placement:  Intubating stylet    Insertion Site:  Oral  Blade Type:  Weeks  Laryngoscope Blade/Videolaryngoscope Blade Size:  2  ETT Size (mm):  8.0  Measured from:  Lips  ETT to Lips (cm):  24  Placement Verified by: auscultation and capnometry    Cormack-Lehane Classification:  Grade I - full view of glottis  Number of Attempts at Approach:  1

## 2021-04-09 NOTE — DISCHARGE INSTRUCTIONS
Discharge Instructions    Discharged to home by car with relative. Discharged via wheelchair, hospital escort: Yes.  Special equipment needed: Not Applicable    Be sure to schedule a follow-up appointment with your primary care doctor or any specialists as instructed.     Discharge Plan:   Diet Plan: Discussed  Activity Level: Discussed  Confirmed Follow up Appointment: Patient to Call and Schedule Appointment  Confirmed Symptoms Management: Discussed  Medication Reconciliation Updated: Yes    I understand that a diet low in cholesterol, fat, and sodium is recommended for good health. Unless I have been given specific instructions below for another diet, I accept this instruction as my diet prescription.   Other diet: Diabetic diet    Special Instructions: None    · Is patient discharged on Warfarin / Coumadin?   No     Depression / Suicide Risk    As you are discharged from this [facility] facility, it is important to learn how to keep safe from harming yourself.    Recognize the warning signs:  · Abrupt changes in personality, positive or negative- including increase in energy   · Giving away possessions  · Change in eating patterns- significant weight changes-  positive or negative  · Change in sleeping patterns- unable to sleep or sleeping all the time   · Unwillingness or inability to communicate  · Depression  · Unusual sadness, discouragement and loneliness  · Talk of wanting to die  · Neglect of personal appearance   · Rebelliousness- reckless behavior  · Withdrawal from people/activities they love  · Confusion- inability to concentrate     If you or a loved one observes any of these behaviors or has concerns about self-harm, here's what you can do:  · Talk about it- your feelings and reasons for harming yourself  · Remove any means that you might use to hurt yourself (examples: pills, rope, extension cords, firearm)  · Get professional help from the community (Mental Health, Substance Abuse,  psychological counseling)  · Do not be alone:Call your Safe Contact- someone whom you trust who will be there for you.  · Call your local CRISIS HOTLINE 174-2983 or 034-882-7148  · Call your local Children's Mobile Crisis Response Team [place] (756) 959-6678 or www.[employer]  · Call the toll free National Suicide Prevention Hotlines   · National Suicide Prevention Lifeline 435-005-TTUT (3505)  · [facility] Line Network 800-SUICIDE (499-6879)      Implanted Port Insertion  Implanted port insertion is a procedure to put in a port and catheter. The port is a device with an injectable disk that can be accessed by your health care provider. The port is connected to a vein in the chest or neck by a small flexible tube (catheter). There are different types of ports. The implanted port may be used as a long-term IV access for:  · Medicines, such as chemotherapy.  · Fluids.  · Liquid nutrition, such as total parenteral nutrition (TPN).  When you have a port, this means that your health care provider will not need to use the veins in your arms for these procedures.  Tell a health care provider about:  · Any allergies you have.  · All medicines you are taking, especially blood thinners, as well as any vitamins, herbs, eye drops, creams, over-the-counter medicines, and steroids.  · Any problems you or family members have had with anesthetic medicines.  · Any blood disorders you have.  · Any surgeries you have had.  · Any medical conditions you have or have had, including diabetes or kidney problems.  · Whether you are pregnant or may be pregnant.  What are the risks?  Generally, this is a safe procedure. However, problems may occur, including:  · Allergic reactions to medicines or dyes.  · Damage to other structures or organs.  · Infection.  · Damage to the blood vessel, bruising, or bleeding at the puncture site.  · Blood clot.  · Breakdown of the skin over the port.  · A collection of air in the chest that can  cause one of the lungs to collapse (pneumothorax). This is rare.  What happens before the procedure?  Medicines  · Ask your health care provider about:  ? Changing or stopping your regular medicines. This is especially important if you are taking diabetes medicines or blood thinners.  ? Taking medicines such as aspirin and ibuprofen. These medicines can thin your blood. Do not take these medicines unless your health care provider tells you to take them.  ? Taking over-the-counter medicines, vitamins, herbs, and supplements.  Staying hydrated  Follow instructions from your health care provider about hydration, which may include:  · Up to 2 hours before the procedure - you may continue to drink clear liquids, such as water, clear fruit juice, black coffee, and plain tea.    Eating and drinking restrictions  · Follow instructions from your health care provider about eating and drinking, which may include:  ? 8 hours before the procedure - stop eating heavy meals or foods, such as meat, fried foods, or fatty foods.  ? 6 hours before the procedure - stop eating light meals or foods, such as toast or cereal.  ? 6 hours before the procedure - stop drinking milk or drinks that contain milk.  ? 2 hours before the procedure - stop drinking clear liquids.  General instructions  · Plan to have someone take you home from the hospital or clinic.  · If you will be going home right after the procedure, plan to have someone with you for 24 hours.  · You may have blood tests.  · Do not use any products that contain nicotine or tobacco for at least 4-6 weeks before the procedure. These products include cigarettes, e-cigarettes, and chewing tobacco. If you need help quitting, ask your health care provider.  · Ask your health care provider what steps will be taken to help prevent infection. These may include:  ? Removing hair at the surgery site.  ? Washing skin with a germ-killing soap.  ? Taking antibiotic medicine.  What happens  during the procedure?    · An IV will be inserted into one of your veins.  · You will be given one or more of the following:  ? A medicine to help you relax (sedative).  ? A medicine to numb the area (local anesthetic).  · Two small incisions will be made to insert the port.  ? One smaller incision will be made in your neck to get access to the vein where the catheter will lie.  ? The other incision will be made in the upper chest. This is where the port will lie.  · The procedure may be done using continuous X-ray (fluoroscopy) or other imaging tools for guidance.  · The port and catheter will be placed. There may be a small, raised area where the port is.  · The port will be flushed with a salt solution (saline), and blood will be drawn to make sure that it is working correctly.  · The incisions will be closed.  · Bandages (dressings) may be placed over the incisions.  The procedure may vary among health care providers and hospitals.  What happens after the procedure?  · Your blood pressure, heart rate, breathing rate, and blood oxygen level will be monitored until you leave the hospital or clinic.  · Do not drive for 24 hours if you were given a sedative during your procedure.  · You will be given a 's information card for the type of port that you have. Keep this with you.  · Your port will need to be flushed and checked as told by your health care provider, usually every few weeks.  · A chest X-ray will be done to:  ? Check the placement of the port.  ? Make sure there is no injury to your lung.  Summary  · Implanted port insertion is a procedure to put in a port and catheter.  · The implanted port is used as a long-term IV access.  · The port will need to be flushed and checked as told by your health care provider, usually every few weeks.  · Keep your 's information card with you at all times.  This information is not intended to replace advice given to you by your health care  provider. Make sure you discuss any questions you have with your health care provider.  Document Released: 10/08/2014 Document Revised: 04/10/2020 Document Reviewed: 07/16/2019  Elsevier Patient Education © 2020 [employer]

## 2021-04-12 NOTE — DISCHARGE SUMMARY
Discharge Summary    CHIEF COMPLAINT ON ADMISSION  jaundice    Reason for Admission   Chief Complaint  75 y.o. male admitted 4/7/2021 with jaundice     Hospital Course  75M with recent diagnosis of pancreatic head cancer diabetes hemorrhagic disorder and urinary bladder disorder presents with jaundice sent from outpatient physician for evaluation.  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 Moore.  The patient's daughter states they were told to present to the ED for ERCP placement.  Patient is complaining of abdominal pain loss of appetite and difficulty passing gas. Denies sob, cough, chest pain/pressure, extremity swelling, diaphoresis, fever, chills, nausea, vomiting, hemoptysis, diarrhea, headaches, dysuria/dyspareunia, polyuria, or polydipsia.      Upon admission no white count elevated glucose of 224 elevated AST of 129 elevated ALT 69  1T bili 6.3 and mild anemia hemoglobin 12.2 macrocytic.  ED Dr. Huff has consulted GI consultants with Dr. Calderon who took the call and agreed to evaluate the patient.  Will admit patient for jaundice and elevated bilirubin keep n.p.o. with anticipated ERCP tomorrow morning     Interval Problem Update  Patient seen this morning appears to be jaundice otherwise doing okay , decreased appetite for the past few months.  He is complaining of some abdominal pain which is mild nonradiating nonspecific.  Notes and labs have been reviewed by me deep detail patient will likely get a ERCP by GI later today.     Patient actually underwent ERCP on 4/9/2021.  Please see full procedure note for detail.  At this time patient was tolerating his diet and GI recommended follow-up outpatient.  PROCEDURE:  Placement of right cephalic cutdown PowerPort.    Procedure(s):  INSERTION, CATHETER - CEPHALIC POWER PORT - Wound Class: Clean  ERCP (ENDOSCOPIC RETROGRADE CHOLANGIOPANCREATOGRAPHY) - Wound Class: Clean  Contaminated  ERCP,WITH CALCULUS REMOVAL FROM BILE OR PANCREATIC DUCT - Wound Class: Clean Contaminated       ERCP with:      Bile duct stent removal                          Bile duct stone extraction                          Bile duct metal stent placement                          Radiologic interpretation of static and dynamic fluoroscopic images     Indication:        Pancreas cancer with biliary obstruction     Sedation:         GA (Mock)     Findings:                          Ampulla                                      Post sphincterotomy anatomy                                      Bile duct stent in place - removed with snare                          Pancreatic duct                                      Neither injected nor cannulated                          CBD                                      2cm stricture in head of pancreas                                      Stones / debris in proximal duct                 Therapeutics                          Bile duct stent removal with snare                          Bile duct balloon stone and sludge extraction                          Bile duct uncovered metal stent placement - 10mm x 40mm     Plan:                Follow liver enzymes                          Plan per Dr. Quintanilla and oncology    She denies any current complaint on the day of discharge is tolerating a diet well I discussed the case in detail with Dr. giraldo and Dr. Petty  and they are okay with discharging patient and having him follow-up outpatient.  Patient understood and agreed with above plan          Jaundice- (present on admission)  Assessment & Plan  In setting of pancreatic head cancer s/p biliary stent placement in recent past.  Likely obstructive Jaundice  GI consulted: ERCP   keep npo   Rocephin/Flagyl ordered 1 dose.     DM (diabetes mellitus) (HCC)- (present on admission)  Assessment & Plan  a1c  Glucose in 200's upon admission.  ISS while NPO  Home med: Metformin not  ordered.     Abdominal pain- (present on admission)  Assessment & Plan  Continue Home Percocet and tramadol        Malignant neoplasm of head of pancreas (HCC)- (present on admission)  Assessment & Plan  Insetting of biliary stent placement in the recent past     Hemorrhagic disorder (HCC)- (present on admission)  Assessment & Plan  chronic     Hypertension- (present on admission)  Assessment & Plan  Continue home medication valsartan and monitor.     BPH (benign prostatic hyperplasia)- (present on admission)  Assessment & Plan  Cont. Tamsulosin/finasteride home med.            Admission Date  4/7/21    CODE STATUS  Prior    HPI & HOSPITAL COURSE    Therefore, he is discharged in good and stable condition to home with close outpatient follow-up.    The patient met 2-midnight criteria for an inpatient stay at the time of discharge.    Discharge Date  4/9/21    FOLLOW UP ITEMS POST DISCHARGE  pcp  Gi  surgery    DISCHARGE DIAGNOSES  Active Problems:    * No active hospital problems. *  Resolved Problems:    * No resolved hospital problems. *      FOLLOW UP  No future appointments.  No follow-up provider specified.    MEDICATIONS ON DISCHARGE     Medication List      Notice    Cannot display discharge medications because the patient has not yet been admitted.       CONTINUE taking these medications      Instructions   ALLOPURINOL PO    Take 500 mg by mouth every day.  Dose: 500 mg      amoxicillin-clavulanate 875-125 MG Tabs  Commonly known as: AUGMENTIN    Take 1 tablet by mouth 2 times a day.  Dose: 1 tablet      atorvastatin 10 MG Tabs  Commonly known as: LIPITOR    Take 10 mg by mouth every evening.  Dose: 10 mg      cyanocobalamin 1000 MCG Tabs  Commonly known as: VITAMIN B12    Take  by mouth every day.      FINASTERIDE PO    Take  by mouth.      gabapentin 300 MG Caps  Commonly known as: NEURONTIN    Take 300 mg by mouth 3 times a day.  Dose: 300 mg      metFORMIN 500 MG Tabs  Commonly known as: GLUCOPHAGE     Take 500 mg by mouth 2 times a day.  Dose: 500 mg      omeprazole 40 MG delayed-release capsule  Commonly known as: PRILOSEC    Take 40 mg by mouth every day.  Dose: 40 mg      oxyCODONE-acetaminophen 5-325 MG Tabs  Commonly known as: PERCOCET    Take 2 Tablets by mouth every four hours as needed.  Dose: 2 tablet      sulfamethoxazole-trimethoprim 800-160 MG tablet  Commonly known as: BACTRIM DS    Take 1 tablet by mouth 2 times a day.  Dose: 1 tablet      tamsulosin 0.4 MG capsule  Commonly known as: FLOMAX    Take 0.8 mg by mouth every day.  Dose: 0.8 mg      valsartan 160 MG Tabs  Commonly known as: DIOVAN    Take 160 mg by mouth every day.  Dose: 160 mg      vitamin D 1000 UNIT Tabs  Commonly known as: VITAMIND D3    Take 1,000 Units by mouth every day.  Dose: 1,000 Units              Allergies  Allergies   Allergen Reactions   • Morphine Nausea       DIET  No orders of the defined types were placed in this encounter.      ACTIVITY  As tolerated.  Weight bearing as tolerated    CONSULTATIONS  Gi  surgery    PROCEDURES     Procedure(s):  INSERTION, CATHETER - CEPHALIC POWER PORT - Wound Class: Clean  ERCP (ENDOSCOPIC RETROGRADE CHOLANGIOPANCREATOGRAPHY) - Wound Class: Clean Contaminated  ERCP,WITH CALCULUS REMOVAL FROM BILE OR PANCREATIC DUCT - Wound Class: Clean Contaminated     Surgeon(s):  SPRING Lai M.D.     Anesthesiologist/Type of Anesthesia:  Anesthesiologist: Larry Torres M.D./General     Surgical Staff:  Circulator: Slime Meza R.N.  Endoscopy Technician: Syl Barrientos  Scrub Person: [facility]  Radiology Technologist: Mickey Garsia  Endoscopy Nurse: Kathy Rivera R.N.     Specimens removed if any:  * No specimens in log *     Dr. Gaines  [facility]  [facility]  [phone]     ERCP with:      Bile duct stent removal                          Bile duct stone extraction                          Bile duct metal stent placement                           Radiologic interpretation of static and dynamic fluoroscopic images     Indication:        Pancreas cancer with biliary obstruction     Sedation:         GA (Mock)     Findings:                          Ampulla                                      Post sphincterotomy anatomy                                      Bile duct stent in place - removed with snare                          Pancreatic duct                                      Neither injected nor cannulated                          CBD                                      2cm stricture in head of pancreas                                      Stones / debris in proximal duct                 Therapeutics                          Bile duct stent removal with snare                          Bile duct balloon stone and sludge extraction                          Bile duct uncovered metal stent placement - 10mm x 40mm     Plan:                Follow liver enzymes                          Plan per Dr. Quintanilla and oncology                          Patient may benefit from EUS with celiac block if pain continues           LABORATORY  Lab Results   Component Value Date    SODIUM 134 (L) 04/08/2021    POTASSIUM 3.6 04/08/2021    CHLORIDE 100 04/08/2021    CO2 23 04/08/2021    GLUCOSE 211 (H) 04/08/2021    BUN 6 (L) 04/08/2021    CREATININE 0.30 (L) 04/08/2021        Lab Results   Component Value Date    WBC 6.4 04/08/2021    HEMOGLOBIN 11.8 (L) 04/08/2021    HEMATOCRIT 34.9 (L) 04/08/2021    PLATELETCT 247 04/08/2021        Total time of the discharge process exceeds 45 minutes.

## 2021-04-21 ENCOUNTER — HOSPITAL ENCOUNTER (OUTPATIENT)
Dept: RADIOLOGY | Facility: MEDICAL CENTER | Age: 76
End: 2021-04-21
Attending: INTERNAL MEDICINE
Payer: COMMERCIAL

## 2021-04-21 DIAGNOSIS — R94.02 ABNORMAL BRAIN SCAN: ICD-10-CM

## 2021-04-21 PROCEDURE — 70553 MRI BRAIN STEM W/O & W/DYE: CPT | Mod: MH

## 2021-04-21 PROCEDURE — 700117 HCHG RX CONTRAST REV CODE 255: Performed by: INTERNAL MEDICINE

## 2021-04-21 PROCEDURE — A9576 INJ PROHANCE MULTIPACK: HCPCS | Performed by: INTERNAL MEDICINE

## 2021-04-21 RX ADMIN — GADOTERIDOL 15 ML: 279.3 INJECTION, SOLUTION INTRAVENOUS at 17:54

## 2021-07-10 ENCOUNTER — HOSPITAL ENCOUNTER (OUTPATIENT)
Dept: RADIOLOGY | Facility: MEDICAL CENTER | Age: 76
End: 2021-07-10
Attending: INTERNAL MEDICINE
Payer: COMMERCIAL

## 2021-07-10 DIAGNOSIS — C25.0 MALIGNANT NEOPLASM OF HEAD OF PANCREAS (HCC): ICD-10-CM

## 2021-07-10 PROCEDURE — 700117 HCHG RX CONTRAST REV CODE 255: Performed by: INTERNAL MEDICINE

## 2021-07-10 PROCEDURE — 71260 CT THORAX DX C+: CPT

## 2021-07-10 RX ADMIN — IOHEXOL 25 ML: 240 INJECTION, SOLUTION INTRATHECAL; INTRAVASCULAR; INTRAVENOUS; ORAL at 17:11

## 2021-07-10 RX ADMIN — IOHEXOL 100 ML: 350 INJECTION, SOLUTION INTRAVENOUS at 17:11

## 2021-07-19 ENCOUNTER — PATIENT OUTREACH (OUTPATIENT)
Dept: OTHER | Facility: MEDICAL CENTER | Age: 76
End: 2021-07-19

## 2021-07-19 NOTE — PROGRESS NOTES
Oncology nurse navigator called patient to follow up on a referral sent over by the radiation department.  Oncology nurse navigator introduced self my role and my services.  Patient reports that he is anxious to get rid of this tumor so he is looking forward to meeting with Dr. Jimenez.  He reports no barriers to care at this time.  [facility].

## 2021-07-27 ENCOUNTER — APPOINTMENT (OUTPATIENT)
Dept: SURGICAL ONCOLOGY | Facility: MEDICAL CENTER | Age: 76
End: 2021-07-27
Payer: COMMERCIAL

## 2021-07-29 ENCOUNTER — HOSPITAL ENCOUNTER (OUTPATIENT)
Dept: RADIATION ONCOLOGY | Facility: MEDICAL CENTER | Age: 76
End: 2021-07-31
Attending: RADIOLOGY
Payer: COMMERCIAL

## 2021-07-29 VITALS
TEMPERATURE: 98.5 F | HEIGHT: 71 IN | SYSTOLIC BLOOD PRESSURE: 126 MMHG | DIASTOLIC BLOOD PRESSURE: 75 MMHG | HEART RATE: 69 BPM | WEIGHT: 190.48 LBS | OXYGEN SATURATION: 95 % | BODY MASS INDEX: 26.67 KG/M2

## 2021-07-29 DIAGNOSIS — C25.0 MALIGNANT NEOPLASM OF HEAD OF PANCREAS (HCC): ICD-10-CM

## 2021-07-29 PROCEDURE — 99205 OFFICE O/P NEW HI 60 MIN: CPT | Performed by: RADIOLOGY

## 2021-07-29 PROCEDURE — 99214 OFFICE O/P EST MOD 30 MIN: CPT | Performed by: RADIOLOGY

## 2021-07-29 RX ORDER — ONDANSETRON 4 MG/1
8 TABLET, FILM COATED ORAL
COMMUNITY
End: 2022-01-01 | Stop reason: SDUPTHER

## 2021-07-29 RX ORDER — PANTOPRAZOLE SODIUM 40 MG/1
TABLET, DELAYED RELEASE ORAL
COMMUNITY
End: 2021-11-03

## 2021-07-29 RX ORDER — FINASTERIDE 5 MG/1
TABLET, FILM COATED ORAL
Status: ON HOLD | COMMUNITY
End: 2022-01-01

## 2021-07-29 ASSESSMENT — PAIN SCALES - GENERAL: PAINLEVEL: NO PAIN

## 2021-07-29 ASSESSMENT — FIBROSIS 4 INDEX: FIB4 SCORE: 4.43

## 2021-07-29 NOTE — CT SIMULATION
PATIENT NAME Zeus Rodríguez   PRIMARY PHYSICIAN Ric Delgadillo [id number]   REFERRING PHYSICIAN No ref. provider found 1946     Malignant neoplasm of head of pancreas (HCC)  Staging form: Exocrine Pancreas, AJCC 8th Edition  - Clinical stage from 7/29/2021: Stage III (cT4, cN0, cM0) - Signed by Art Jimenez M.D. on 7/29/2021  Total positive nodes: 0         Treatment Planning CT Simulation      Order Questions     Question Answer Comment    Is this for a new course of treatment? Yes     Is this an Addendum? No     Implanted Device/Pacemaker No     Simulation Status Initial     Planned Start Date 9/15/2021     Treatment Site Pancreas - Head     Laterality None     Treatment Technique IMRT     Other Technique(s) IMRT     Treatment Pattern/Frequency Daily 28 tx    Concurrent Chemotherapy Yes     Ordering Provider ELISEO WHITTINGTON xeloda    CT Technique 3D      4D     Slice Thickness 3mm     Scan Extent Chest     Contrast Esophageal      IV     IV Contrast Volume 80 cc     Treatment Device(s) Vac Morenita      Wing Board     Patient Attire Gown     Patient Position Supine     Patient Orientation Head First     Arm Position Up     Chin Position Neutral     Bladder No preference     Treatment Machine No preference     Treatment Image Guidance CBCT     Frequency (CBCT) Daily     Image Guidance Match PTV - Soft Tissue     Treatment Planning Image Fusion CT/PET     Other Orders Special Tx Procedure      Weekly Physics Check

## 2021-07-29 NOTE — NON-PROVIDER
"Patient was seen today in clinic with Dr. Jimenez for consultation.  Vitals signs and weight were obtained and pain assessment was completed.  Allergies and medications were reviewed with the patient.  Toxicities of treatment assessed.     Vitals/Pain:  Vitals:    07/29/21 1016   BP: 126/75   BP Location: Right arm   Patient Position: Sitting   BP Cuff Size: Adult   Pulse: 69   Temp: 36.9 °C (98.5 °F)   SpO2: 95%   Weight: 86.4 kg (190 lb 7.6 oz)   Height: 1.803 m (5' 11\")   Pain Score: No pain        Allergies:   Morphine    Current Medications:  Current Outpatient Medications   Medication Sig Dispense Refill   • ondansetron (ZOFRAN) 4 MG Tab tablet Take 8 mg by mouth 2 (two) times a day.     • omeprazole (PRILOSEC) 40 MG delayed-release capsule Take 40 mg by mouth every day.     • ALLOPURINOL PO Take 500 mg by mouth every day.     • vitamin D (VITAMIND D3) 1000 UNIT Tab Take 1,000 Units by mouth every day.     • cyanocobalamin (VITAMIN B12) 1000 MCG Tab Take  by mouth every day.     • metFORMIN (GLUCOPHAGE) 500 MG Tab Take 500 mg by mouth 2 times a day.     • tamsulosin (FLOMAX) 0.4 MG capsule Take 0.8 mg by mouth every day.     • atorvastatin (LIPITOR) 10 MG Tab Take 10 mg by mouth every evening.     • gabapentin (NEURONTIN) 300 MG Cap Take 300 mg by mouth 3 times a day.     • valsartan (DIOVAN) 160 MG Tab Take 160 mg by mouth every day.     • FINASTERIDE PO Take  by mouth.     • oxyCODONE-acetaminophen (PERCOCET) 5-325 MG Tab Take 2 Tablets by mouth every four hours as needed. (Patient not taking: Reported on 7/29/2021)     • sulfamethoxazole-trimethoprim (BACTRIM DS) 800-160 MG tablet Take 1 tablet by mouth 2 times a day.     • amoxicillin-clavulanate (AUGMENTIN) 875-125 MG Tab Take 1 tablet by mouth 2 times a day.       No current facility-administered medications for this encounter.           Heike Miller, Med Ass't  "

## 2021-07-29 NOTE — CONSULTS
RADIATION ONCOLOGY CONSULT    DATE OF SERVICE: 7/29/2021    IDENTIFICATION: A 76 y.o. male with   Visit Diagnoses     ICD-10-CM   1. Malignant neoplasm of head of pancreas (HCC)  C25.0     Malignant neoplasm of head of pancreas (HCC)  Staging form: Exocrine Pancreas, AJCC 8th Edition  - Clinical stage from 7/29/2021: Stage III (cT4, cN0, cM0) - Signed by Art Jimenez M.D. on 7/29/2021  Total positive nodes: 0    He is here at the kind request of Dr. Greer       HISTORY OF PRESENT ILLNESS:   Patient originally presented with fatigue abdominal pain and dark urine and pale stools with signs of early jaundice and was sent to the ER and had a CAT scan at [facility] in February which showed a pancreatic head mass extending up towards the neck with narrowing of the portal vein SMV confluence as well as abutment of the common hepatic artery with no signs of metastatic disease.  There was a PET scan February 22, 2021 which showed uptake within the head of the pancreas as well as a pituitary gland mass.  Patient had an ERCP with stent placement with bx showing adenocarcinoma. EUS 3/24/21 by Dr. Gaines showing 29mm abutting portal vein, but clear plane between tumor and celiac and SMA.  He was seen by originally seen by Dr. De and then switched to Dr. Greer and started chemotherapy with FOLFIRINOX.  Patient has completed 7 cycles and had an interval CT chest abdomen pelvis July 11, 2021 which shows mass in the pancreatic head and uncinate process not significant change from prior with encasement of SMV and SMA.  Of night though his CEA has dropped from 711 down to 375.  Patient is feeling well overall but does have some neuropathy and some crampy abdominal pain.    PROBLEM LIST:  Patient Active Problem List   Diagnosis   • Malignant neoplasm of head of pancreas (HCC)   • Abnormal CT of the abdomen   • Abdominal pain   • Elevated CA 19-9 level   • Jaundice   • DM (diabetes mellitus) (HCC)   • BPH (benign  prostatic hyperplasia)   • Hypertension   • Hemorrhagic disorder (HCC)        PAST SURGICAL HISTORY:  Past Surgical History:   Procedure Laterality Date   • PB ERCP,DIAGNOSTIC N/A 4/9/2021    Procedure: ERCP (ENDOSCOPIC RETROGRADE CHOLANGIOPANCREATOGRAPHY);  Surgeon: Peter Gaines M.D.;  Location: [facility];  Service: General   • PB ERCP,W/REMOVAL STONE,ANEUDY/PANCR DUCTS N/A 4/9/2021    Procedure: ERCP,WITH CALCULUS REMOVAL FROM BILE OR PANCREATIC DUCT;  Surgeon: Hernan Quintanilla M.D.;  Location: [facility];  Service: General   • CATH PLACEMENT Right 4/9/2021    Procedure: INSERTION, CATHETER - CEPHALIC POWER PORT;  Surgeon: Hernan Quintanilla M.D.;  Location: [facility];  Service: General   • PB UPPER GI ENDOSCOPY,DIAGNOSIS  3/24/2021    Procedure: GASTROSCOPY;  Surgeon: Peter Gaines M.D.;  Location: [facility];  Service: EUS   • EGD W/ENDOSCOPIC ULTRASOUND  3/24/2021    Procedure: EGD, WITH ENDOSCOPIC US;  Surgeon: Peter Gaines M.D.;  Location: [facility];  Service: EUS   • EGD WITH ASP/BX  3/24/2021    Procedure: EGD, WITH ASPIRATION BIOPSY;  Surgeon: Peter Gaines M.D.;  Location: [facility];  Service: EUS   • KNEE ARTHROPLASTY TOTAL Right 2018   • CATARACT PHACO WITH IOL Bilateral 2017   • OTHER  2017    L3,4,5 surgery   • CARPAL TUNNEL RELEASE Bilateral 2017   • OTHER  2016    cervical fusion       CURRENT MEDICATIONS:  Current Outpatient Medications   Medication Sig Dispense Refill   • ondansetron (ZOFRAN) 4 MG Tab tablet Take 8 mg by mouth 2 (two) times a day.     • omeprazole (PRILOSEC) 40 MG delayed-release capsule Take 40 mg by mouth every day.     • ALLOPURINOL PO Take 500 mg by mouth every day.     • vitamin D (VITAMIND D3) 1000 UNIT Tab Take 1,000 Units by mouth every day.     • cyanocobalamin (VITAMIN B12) 1000 MCG Tab Take  by mouth every day.     • metFORMIN (GLUCOPHAGE) 500 MG Tab Take 500  mg by mouth 2 times a day.     • tamsulosin (FLOMAX) 0.4 MG capsule Take 0.8 mg by mouth every day.     • atorvastatin (LIPITOR) 10 MG Tab Take 10 mg by mouth every evening.     • gabapentin (NEURONTIN) 300 MG Cap Take 300 mg by mouth 3 times a day.     • valsartan (DIOVAN) 160 MG Tab Take 160 mg by mouth every day.     • FINASTERIDE PO Take  by mouth.     • pantoprazole (PROTONIX) 40 MG Tablet Delayed Response pantoprazole 40 mg tablet,delayed release   Take 1 tablet every day by oral route.     • finasteride (PROSCAR) 5 MG Tab finasteride 5 mg tablet   Take 1 tablet every day by oral route.     • oxyCODONE-acetaminophen (PERCOCET) 5-325 MG Tab Take 2 Tablets by mouth every four hours as needed. (Patient not taking: Reported on 7/29/2021)     • sulfamethoxazole-trimethoprim (BACTRIM DS) 800-160 MG tablet Take 1 tablet by mouth 2 times a day.     • amoxicillin-clavulanate (AUGMENTIN) 875-125 MG Tab Take 1 tablet by mouth 2 times a day.       No current facility-administered medications for this encounter.       ALLERGIES:    Morphine    FAMILY HISTORY:    family history includes Cancer in his father, mother, and sister.    SOCIAL HISTORY:     reports that he quit smoking about 30 years ago. His smoking use included cigarettes. He started smoking about 60 years ago. He has a 30.00 pack-year smoking history. He has quit using smokeless tobacco. He reports previous alcohol use of about 1.2 oz of alcohol per week. He reports that he does not use drugs.    REVIEW OF SYSTEMS:  A review of systems for today's date of service was reviewed and uploaded into the electronic medical record.      PHYSICAL EXAM:    ECOG PERFORMANCE STATUS:  ECOG Performance Review 7/29/2021   ECOG Performance Status Restricted in physically strenuous activity but ambulatory and able to carry out work of a light or sedentary nature, e.g., light house work, office work   Some recent data might be hidden     Karnofsky Score 7/29/2021   Karnofsky  "Score 70   Some recent data might be hidden     /75 (BP Location: Right arm, Patient Position: Sitting, BP Cuff Size: Adult)   Pulse 69   Temp 36.9 °C (98.5 °F)   Ht 1.803 m (5' 11\")   Wt 86.4 kg (190 lb 7.6 oz)   SpO2 95%   BMI 26.57 kg/m²   Physical Exam  Vitals reviewed.   HENT:      Head: Normocephalic and atraumatic.   Eyes:      Pupils: Pupils are equal, round, and reactive to light.   Cardiovascular:      Rate and Rhythm: Normal rate.   Pulmonary:      Effort: Pulmonary effort is normal.   Abdominal:      General: Abdomen is flat.   Musculoskeletal:         General: Normal range of motion.      Cervical back: Normal range of motion.   Skin:     General: Skin is warm.   Neurological:      General: No focal deficit present.      Mental Status: He is alert.   Psychiatric:         Mood and Affect: Mood normal.          LABORATORY DATA:   Lab Results   Component Value Date/Time    WBC 6.4 04/08/2021 0120    WBC 6.5 04/07/2021 1740    HEMOGLOBIN 11.8 (L) 04/08/2021 0120    HEMOGLOBIN 12.2 (L) 04/07/2021 1740    HEMATOCRIT 34.9 (L) 04/08/2021 0120    HEMATOCRIT 36.2 (L) 04/07/2021 1740    MCV 98.3 (H) 04/08/2021 0120    MCV 98.9 (H) 04/07/2021 1740    PLATELETCT 247 04/08/2021 0120    PLATELETCT 256 04/07/2021 1740    NEUTS 3.58 04/07/2021 1740      Lab Results   Component Value Date/Time    SODIUM 134 (L) 04/08/2021 0120    SODIUM 131 (L) 04/07/2021 1740    POTASSIUM 3.6 04/08/2021 0120    POTASSIUM 3.7 04/07/2021 1740    BUN 6 (L) 04/08/2021 0120    BUN 7 (L) 04/07/2021 1740    CREATININE 0.30 (L) 04/08/2021 0120    CREATININE 0.30 (L) 04/07/2021 1740    CALCIUM 8.7 04/08/2021 0120    CALCIUM 9.0 04/07/2021 1740    ALBUMIN 3.1 (L) 04/08/2021 0120    ALBUMIN 3.2 04/07/2021 1740    ASTSGOT 114 (H) 04/08/2021 0120    ASTSGOT 129 (H) 04/07/2021 1740    ALKPHOSPHAT 411 (H) 04/08/2021 0120    ALKPHOSPHAT 431 (H) 04/07/2021 1740    IFNOTAFR >60 04/08/2021 0120    IFNOTAFR >60 04/07/2021 1740       RADIOLOGY " DATA:  CT-CHEST,ABDOMEN,PELVIS WITH    Result Date: 7/11/2021  7/10/2021 4:01 PM HISTORY/REASON FOR EXAM: Pancreatic cancer restaging TECHNIQUE/EXAM DESCRIPTION: CT scan of the chest, abdomen and pelvis with contrast. Thin-section helical scanning was obtained with intravenous contrast from the lung apices through the pubic symphysis to include the chest, abdomen and pelvis. 100 mL of Omnipaque 350 nonionic contrast was administered intravenously without complication. Low dose optimization technique was utilized for this CT exam including automated exposure control and adjustment of the mA and/or kV according to patient size. COMPARISON: 3/7/2021. FINDINGS: CT Chest: Vascular: The aorta is not aneurysmal. Atherosclerotic plaque is seen including coronary artery calcification. . Mediastinum/Jolly: No significant adenopathy. Cardiac: The heart is not enlarged. There is trace pericardial fluid. Pleura: No pleural effusion or pneumothorax identified. . Lungs: There is mild lingula, right middle lobe and bibasilar atelectasis. No suspicious pulmonary nodules are identified.. Soft tissues: There is gynecomastia.. Bones: Degenerative changes are seen in the spine. . CT Abdomen and Pelvis: Liver: The liver is hypodense compatible with hepatic steatosis. The left lobe of the liver is atrophic.. Gallbladder: There is air within the gallbladder. Biliary: There is a biliary stent with pneumobilia.. Spleen: Spleen is not enlarged. . Pancreas: There is pancreatic ductal dilatation. Pancreatic mass involving the pancreatic head and uncinate process is not significantly changed in size compared to prior. The SMV is almost completely encased and is mildly narrowed. The SMA is encased. There is mild stranding about the pancreas. Adrenal glands: No adrenal mass is seen. . Kidneys: There is no evidence of hydronephrosis. Small hypodense renal lesions bilaterally too small to characterize but likely represent small cysts.. Vasculature:  Aorta is not aneurysmal. Atherosclerotic plaque is seen. . Lymph nodes: No inguinal, retroperitoneal or mesenteric lymphadenopathy is seen. . Bowel: No obstruction or acute inflammation. Visualized appendix is unremarkable. Peritoneum: No free intraperitoneal fluid or air is seen. . Pelvis: Bladder appears thick-walled. Calcific densities in the pelvis likely represent phleboliths.. Musculoskeletal: Degenerative changes are seen in the spine. .     1.  Mass in the pancreatic head and uncinate process is not significantly changed in size compared to prior. There is encasement of the SMV and SMA. 2.  Dilatation of the pancreatic duct with mild stranding about the pancreas which can be seen in the setting of pancreatitis. 3.  Pneumobilia with air within the gallbladder noted. A biliary stent is now present. 4.  Hepatic steatosis. 5.  Bladder wall thickening. Correlation with urinalysis is recommended. 6.  No evidence of metastatic disease is identified in the chest.      IMPRESSION:    A 76 y.o. with  Visit Diagnoses     ICD-10-CM   1. Malignant neoplasm of head of pancreas (HCC)  C25.0     Malignant neoplasm of head of pancreas (HCC)  Staging form: Exocrine Pancreas, AJCC 8th Edition  - Clinical stage from 7/29/2021: Stage III (cT4, cN0, cM0) - Signed by Art Jimenez M.D. on 7/29/2021  Total positive nodes: 0    RECOMMENDATIONS:   I discussed the role of chemoradiation therapy in the treatment of locally advanced pancreatic cancer.  I agree with Dr. Greer and Dr. Sellers's assessment to give induction chemotherapy with FOLFIRINOX but given that he is beginning to be symptomatic from neuropathy after 9 cycles and the most recent CT scan shows nonsignificantly changed mass I do think it is reasonable to proceed with chemoradiation therapy with concurrent Xeloda.  Patient will undergo restaging after 6 weeks in early September as well as repeat CA 19-9 and if has no signs of distant metastatic disease I think it  is reasonable to proceed with chemoradiation therapy.    I explained to him the [facility] [place] Everardo et. Al. [facility] 2018 which studied patients that originally determined to be borderline resectable pancreatic cancer patients who underwent FOLFIRINOX neoadjuvant lady and after restaging who were still found to have persistent vascular involvement received a long course chemoradiation therapy with simultaneous integrated boost to tumor vascular interface of 58.8 Bates in 28 fractions.  In this cohort 97% achieved R0 resection with 2-year overall survival of 56% and among those who underwent resection median PFS was 49 months.    We discussed risk benefits and patient is amenable to treatment and patient will be undergoing CT mapping on September 8 after completion of his chemotherapy and restaging with plans to start treatment around September 15 with concurrent Xeloda.    Thank you for the opportunity to participate in his care.  If any questions or comments, please do not hesitate in calling.    Orders Placed This Encounter   • REFERRAL TO ONCOLOGY NURSE NAVIGATOR   • ondansetron (ZOFRAN) 4 MG Tab tablet   • pantoprazole (PROTONIX) 40 MG Tablet Delayed Response   • finasteride (PROSCAR) 5 MG Tab     CC: Dr. Greer, Dr. Sellers, Dr. Gaines, Dr. Garcia

## 2021-08-16 NOTE — PROGRESS NOTES
Subjective:      Primary care physician:Ric Delgadillo M.D.  Referring Provider: Mulu Palma DO  Medical Oncologist: Krunal Greer MD     Chief Complaint: No chief complaint on file.    Diagnosis:   1. Malignant neoplasm of head of pancreas (HCC)     2. Abdominal pain, unspecified abdominal location     3. Elevated CA 19-9 level     4. Abnormal CT of the abdomen         History of presenting illness:  Zeus Rodríguez  is a pleasant 76 y.o. year old male who presented with ***      Past Medical History:   Diagnosis Date   • Cataract     had surgery   • Dental disorder     upper dentures   • Diabetes (HCC)    • Heart burn    • Hemorrhagic disorder (HCC)     nose-bleeds   • High cholesterol    • Hypertension    • Indigestion    • Pancreas cancer (HCC)    • Snoring    • Urinary bladder disorder      Past Surgical History:   Procedure Laterality Date   • PB ERCP,DIAGNOSTIC N/A 4/9/2021    Procedure: ERCP (ENDOSCOPIC RETROGRADE CHOLANGIOPANCREATOGRAPHY);  Surgeon: Peter Gaines M.D.;  Location: [facility];  Service: General   • PB ERCP,W/REMOVAL STONE,ANEUDY/PANCR DUCTS N/A 4/9/2021    Procedure: ERCP,WITH CALCULUS REMOVAL FROM BILE OR PANCREATIC DUCT;  Surgeon: Hernan Quintanilla M.D.;  Location: [facility];  Service: General   • CATH PLACEMENT Right 4/9/2021    Procedure: INSERTION, CATHETER - CEPHALIC POWER PORT;  Surgeon: Hernan Quintanilla M.D.;  Location: [facility];  Service: General   • PB UPPER GI ENDOSCOPY,DIAGNOSIS  3/24/2021    Procedure: GASTROSCOPY;  Surgeon: Peter Gaines M.D.;  Location: [facility];  Service: EUS   • EGD W/ENDOSCOPIC ULTRASOUND  3/24/2021    Procedure: EGD, WITH ENDOSCOPIC US;  Surgeon: Peter Gaines M.D.;  Location: [facility];  Service: EUS   • EGD WITH ASP/BX  3/24/2021    Procedure: EGD, WITH ASPIRATION BIOPSY;  Surgeon: Peter Gaines M.D.;  Location: [facility];  Service: EUS    • KNEE ARTHROPLASTY TOTAL Right 2018   • CATARACT PHACO WITH IOL Bilateral 2017   • OTHER  2017    L3,4,5 surgery   • CARPAL TUNNEL RELEASE Bilateral 2017   • OTHER  2016    cervical fusion     Allergies   Allergen Reactions   • Morphine Nausea     Outpatient Encounter Medications as of 8/17/2021   Medication Sig Dispense Refill   • ondansetron (ZOFRAN) 4 MG Tab tablet Take 8 mg by mouth 2 (two) times a day.     • pantoprazole (PROTONIX) 40 MG Tablet Delayed Response pantoprazole 40 mg tablet,delayed release   Take 1 tablet every day by oral route.     • finasteride (PROSCAR) 5 MG Tab finasteride 5 mg tablet   Take 1 tablet every day by oral route.     • omeprazole (PRILOSEC) 40 MG delayed-release capsule Take 40 mg by mouth every day.     • ALLOPURINOL PO Take 500 mg by mouth every day.     • vitamin D (VITAMIND D3) 1000 UNIT Tab Take 1,000 Units by mouth every day.     • cyanocobalamin (VITAMIN B12) 1000 MCG Tab Take  by mouth every day.     • metFORMIN (GLUCOPHAGE) 500 MG Tab Take 500 mg by mouth 2 times a day.     • oxyCODONE-acetaminophen (PERCOCET) 5-325 MG Tab Take 2 Tablets by mouth every four hours as needed. (Patient not taking: Reported on 7/29/2021)     • sulfamethoxazole-trimethoprim (BACTRIM DS) 800-160 MG tablet Take 1 tablet by mouth 2 times a day.     • tamsulosin (FLOMAX) 0.4 MG capsule Take 0.8 mg by mouth every day.     • atorvastatin (LIPITOR) 10 MG Tab Take 10 mg by mouth every evening.     • gabapentin (NEURONTIN) 300 MG Cap Take 300 mg by mouth 3 times a day.     • valsartan (DIOVAN) 160 MG Tab Take 160 mg by mouth every day.     • FINASTERIDE PO Take  by mouth.     • amoxicillin-clavulanate (AUGMENTIN) 875-125 MG Tab Take 1 tablet by mouth 2 times a day.       No facility-administered encounter medications on file as of 8/17/2021.     Social History     Socioeconomic History   • Marital status:      Spouse name: Not on file   • Number of children: Not on file   • Years of education:  Not on file   • Highest education level: Not on file   Occupational History   • Not on file   Tobacco Use   • Smoking status: Former Smoker     Packs/day: 1.00     Years: 30.00     Pack years: 30.00     Types: Cigarettes     Start date: 1961     Quit date: 1991     Years since quittin.6   • Smokeless tobacco: Former User   • Tobacco comment: quit 30 years ago   Vaping Use   • Vaping Use: Never used   Substance and Sexual Activity   • Alcohol use: Not Currently     Alcohol/week: 1.2 oz     Types: 2 Cans of beer per week   • Drug use: Never   • Sexual activity: Not on file   Other Topics Concern   • Not on file   Social History Narrative    Patient lives in [place].     Social Determinants of Health     Financial Resource Strain:    • Difficulty of Paying Living Expenses:    Food Insecurity:    • Worried About Running Out of Food in the Last Year:    • Ran Out of Food in the Last Year:    Transportation Needs:    • Lack of Transportation (Medical):    • Lack of Transportation (Non-Medical):    Physical Activity:    • Days of Exercise per Week:    • Minutes of Exercise per Session:    Stress:    • Feeling of Stress :    Social Connections:    • Frequency of Communication with Friends and Family:    • Frequency of Social Gatherings with Friends and Family:    • Attends Congregation Services:    • Active Member of Clubs or Organizations:    • Attends Club or Organization Meetings:    • Marital Status:    Intimate Partner Violence:    • Fear of Current or Ex-Partner:    • Emotionally Abused:    • Physically Abused:    • Sexually Abused:       Social History     Tobacco Use   Smoking Status Former Smoker   • Packs/day: 1.00   • Years: 30.00   • Pack years: 30.00   • Types: Cigarettes   • Start date: 1961   • Quit date: 1991   • Years since quittin.6   Smokeless Tobacco Former User   Tobacco Comment    quit 30 years ago     Social History     Substance and Sexual Activity   Alcohol Use Not  Currently   • Alcohol/week: 1.2 oz   • Types: 2 Cans of beer per week     Social History     Substance and Sexual Activity   Drug Use Never        Family History   Problem Relation Age of Onset   • Cancer Mother         Leukemia   • Cancer Father         Unknown   • Cancer Sister         Breast, Uterine       ROS     Objective:   There were no vitals taken for this visit.    Physical Exam    Labs:  Results for PHOEBE ELMORE (MRN [id number]) as of 8/16/2021 11:01   Ref. Range 4/8/2021 01:20   WBC Latest Ref Range: 4.8 - 10.8 K/uL 6.4   RBC Latest Ref Range: 4.70 - 6.10 M/uL 3.55 (L)   Hemoglobin Latest Ref Range: 14.0 - 18.0 g/dL 11.8 (L)   Hematocrit Latest Ref Range: 42.0 - 52.0 % 34.9 (L)   MCV Latest Ref Range: 81.4 - 97.8 fL 98.3 (H)   MCH Latest Ref Range: 27.0 - 33.0 pg 33.2 (H)   MCHC Latest Ref Range: 33.7 - 35.3 g/dL 33.8   RDW Latest Ref Range: 35.9 - 50.0 fL 55.4 (H)   Platelet Count Latest Ref Range: 164 - 446 K/uL 247   MPV Latest Ref Range: 9.0 - 12.9 fL 12.0   Sodium Latest Ref Range: 135 - 145 mmol/L 134 (L)   Potassium Latest Ref Range: 3.6 - 5.5 mmol/L 3.6   Chloride Latest Ref Range: 96 - 112 mmol/L 100   Co2 Latest Ref Range: 20 - 33 mmol/L 23   Anion Gap Latest Ref Range: 7.0 - 16.0  11.0   Glucose Latest Ref Range: 65 - 99 mg/dL 211 (H)   Bun Latest Ref Range: 8 - 22 mg/dL 6 (L)   Creatinine Latest Ref Range: 0.50 - 1.40 mg/dL 0.30 (L)   GFR If  Latest Ref Range: >60 mL/min/1.73 m 2 >60   GFR If Non  Latest Ref Range: >60 mL/min/1.73 m 2 >60   Calcium Latest Ref Range: 8.5 - 10.5 mg/dL 8.7   AST(SGOT) Latest Ref Range: 12 - 45 U/L 114 (H)   ALT(SGPT) Latest Ref Range: 2 - 50 U/L 61 (H)   Alkaline Phosphatase Latest Ref Range: 30 - 99 U/L 411 (H)   Total Bilirubin Latest Ref Range: 0.1 - 1.5 mg/dL 5.8 (H)   Albumin Latest Ref Range: 3.2 - 4.9 g/dL 3.1 (L)   Total Protein Latest Ref Range: 6.0 - 8.2 g/dL 6.3   Globulin Latest Ref Range: 1.9 - 3.5 g/dL 3.2    A-G Ratio Latest Units: g/dL 1.0   Magnesium Latest Ref Range: 1.5 - 2.5 mg/dL 1.9   Glycohemoglobin Latest Ref Range: 4.0 - 5.6 % 8.9 (H)   Estim. Avg Glu Latest Units: mg/dL 209   Cholesterol,Tot Latest Ref Range: 100 - 199 mg/dL 145   Triglycerides Latest Ref Range: 0 - 149 mg/dL 158 (H)   HDL Latest Ref Range: >=40 mg/dL 17 (A)   LDL Latest Ref Range: <100 mg/dL 96       Imagin/10/21 CT   Per my read,     IMPRESSION:     1.  Mass in the pancreatic head and uncinate process is not significantly changed in size compared to prior. There is encasement of the SMV and SMA.  2.  Dilatation of the pancreatic duct with mild stranding about the pancreas which can be seen in the setting of pancreatitis.  3.  Pneumobilia with air within the gallbladder noted. A biliary stent is now present.  4.  Hepatic steatosis.  5.  Bladder wall thickening. Correlation with urinalysis is recommended.  6.  No evidence of metastatic disease is identified in the chest.    Pathology: 3/8/21      DIAGNOSIS:   A.  Bile duct brushings (cytology):   POSITIVE FOR MALIGNANCY:  Adenocarcinoma present   B.  Pancreatic duct brushings (cytology):   POSITIVE FOR MALIGNANCY:   Adenocarcinoma present        Procedures: 21    Placement of right cephalic cutdown PowerPort    Diagnosis:     1. Malignant neoplasm of head of pancreas (HCC)     2. Abdominal pain, unspecified abdominal location     3. Elevated CA 19-9 level     4. Abnormal CT of the abdomen             Medical Decision Making:  Today's Assessment / Status / Plan:     ***

## 2021-08-31 ENCOUNTER — APPOINTMENT (OUTPATIENT)
Dept: SURGICAL ONCOLOGY | Facility: MEDICAL CENTER | Age: 76
End: 2021-08-31
Payer: COMMERCIAL

## 2021-09-15 ENCOUNTER — HOSPITAL ENCOUNTER (OUTPATIENT)
Dept: RADIATION ONCOLOGY | Facility: MEDICAL CENTER | Age: 76
End: 2021-09-30
Attending: RADIOLOGY
Payer: COMMERCIAL

## 2021-09-16 PROCEDURE — 77334 RADIATION TREATMENT AID(S): CPT | Performed by: RADIOLOGY

## 2021-09-16 PROCEDURE — 77263 THER RADIOLOGY TX PLNG CPLX: CPT | Performed by: RADIOLOGY

## 2021-09-16 PROCEDURE — 77470 SPECIAL RADIATION TREATMENT: CPT | Performed by: RADIOLOGY

## 2021-09-16 PROCEDURE — 77470 SPECIAL RADIATION TREATMENT: CPT | Mod: 26 | Performed by: RADIOLOGY

## 2021-09-16 PROCEDURE — 77290 THER RAD SIMULAJ FIELD CPLX: CPT | Performed by: RADIOLOGY

## 2021-09-16 PROCEDURE — 700111 HCHG RX REV CODE 636 W/ 250 OVERRIDE (IP)

## 2021-09-16 PROCEDURE — 77334 RADIATION TREATMENT AID(S): CPT | Mod: 26 | Performed by: RADIOLOGY

## 2021-09-16 RX ORDER — HEPARIN SODIUM (PORCINE) LOCK FLUSH IV SOLN 100 UNIT/ML 100 UNIT/ML
SOLUTION INTRAVENOUS
Status: ACTIVE
Start: 2021-09-16 | End: 2021-09-17

## 2021-09-16 NOTE — NON-PROVIDER
1444 Consent obtained for IV contrast. Port accessed by [facility] RN.    1541 Port flushed and locked with 5mL of heparin, de-accessed by this RN, pt. tolerated well. Education provided.

## 2021-09-21 ENCOUNTER — APPOINTMENT (OUTPATIENT)
Dept: OPHTHALMOLOGY | Facility: MEDICAL CENTER | Age: 76
End: 2021-09-21
Payer: COMMERCIAL

## 2021-10-01 ENCOUNTER — HOSPITAL ENCOUNTER (OUTPATIENT)
Dept: RADIATION ONCOLOGY | Facility: MEDICAL CENTER | Age: 76
End: 2021-10-31
Attending: RADIOLOGY
Payer: COMMERCIAL

## 2021-10-05 ENCOUNTER — HOSPITAL ENCOUNTER (OUTPATIENT)
Facility: MEDICAL CENTER | Age: 76
End: 2021-10-05
Attending: NURSE PRACTITIONER
Payer: COMMERCIAL

## 2021-10-05 PROCEDURE — 86803 HEPATITIS C AB TEST: CPT

## 2021-10-05 PROCEDURE — 86704 HEP B CORE ANTIBODY TOTAL: CPT

## 2021-10-05 PROCEDURE — 87340 HEPATITIS B SURFACE AG IA: CPT

## 2021-10-05 PROCEDURE — 86706 HEP B SURFACE ANTIBODY: CPT

## 2021-10-06 LAB
HBV CORE AB SERPL QL IA: NONREACTIVE
HBV SURFACE AB SERPL IA-ACNC: <3.5 MIU/ML (ref 0–10)
HBV SURFACE AG SER QL: NORMAL
HCV AB SER QL: NORMAL

## 2021-10-08 PROCEDURE — 77338 DESIGN MLC DEVICE FOR IMRT: CPT | Mod: 26 | Performed by: RADIOLOGY

## 2021-10-08 PROCEDURE — 77338 DESIGN MLC DEVICE FOR IMRT: CPT | Performed by: RADIOLOGY

## 2021-10-08 PROCEDURE — 77300 RADIATION THERAPY DOSE PLAN: CPT | Performed by: RADIOLOGY

## 2021-10-08 PROCEDURE — 77301 RADIOTHERAPY DOSE PLAN IMRT: CPT | Performed by: RADIOLOGY

## 2021-10-08 PROCEDURE — 77293 RESPIRATOR MOTION MGMT SIMUL: CPT | Mod: 26 | Performed by: RADIOLOGY

## 2021-10-08 PROCEDURE — 77301 RADIOTHERAPY DOSE PLAN IMRT: CPT | Mod: 26 | Performed by: RADIOLOGY

## 2021-10-08 PROCEDURE — 77293 RESPIRATOR MOTION MGMT SIMUL: CPT | Performed by: RADIOLOGY

## 2021-10-08 PROCEDURE — 77300 RADIATION THERAPY DOSE PLAN: CPT | Mod: 26 | Performed by: RADIOLOGY

## 2021-10-11 ENCOUNTER — APPOINTMENT (OUTPATIENT)
Dept: RADIATION ONCOLOGY | Facility: MEDICAL CENTER | Age: 76
End: 2021-10-11
Payer: COMMERCIAL

## 2021-10-11 LAB
CHEMOTHERAPY INFUSION START DATE: NORMAL
CHEMOTHERAPY RECORDS: 2.1
CHEMOTHERAPY RECORDS: 5880
CHEMOTHERAPY RECORDS: NORMAL
CHEMOTHERAPY RX CANCER: NORMAL
DATE 1ST CHEMO CANCER: NORMAL
RAD ONC ARIA COURSE LAST TREATMENT DATE: NORMAL
RAD ONC ARIA COURSE TREATMENT ELAPSED DAYS: NORMAL
RAD ONC ARIA REFERENCE POINT DOSAGE GIVEN TO DATE: 2.1
RAD ONC ARIA REFERENCE POINT DOSAGE GIVEN TO DATE: 2.17
RAD ONC ARIA REFERENCE POINT ID: NORMAL
RAD ONC ARIA REFERENCE POINT ID: NORMAL
RAD ONC ARIA REFERENCE POINT SESSION DOSAGE GIVEN: 2.1
RAD ONC ARIA REFERENCE POINT SESSION DOSAGE GIVEN: 2.17

## 2021-10-11 PROCEDURE — 77386 HCHG IMRT DELIVERY COMPLEX: CPT | Performed by: RADIOLOGY

## 2021-10-11 PROCEDURE — 77280 THER RAD SIMULAJ FIELD SMPL: CPT | Performed by: RADIOLOGY

## 2021-10-12 ENCOUNTER — APPOINTMENT (OUTPATIENT)
Dept: RADIATION ONCOLOGY | Facility: MEDICAL CENTER | Age: 76
End: 2021-10-12
Payer: COMMERCIAL

## 2021-10-12 LAB
CHEMOTHERAPY INFUSION START DATE: NORMAL
CHEMOTHERAPY RECORDS: 2.1
CHEMOTHERAPY RECORDS: 5880
CHEMOTHERAPY RECORDS: NORMAL
CHEMOTHERAPY RX CANCER: NORMAL
DATE 1ST CHEMO CANCER: NORMAL
RAD ONC ARIA COURSE LAST TREATMENT DATE: NORMAL
RAD ONC ARIA COURSE TREATMENT ELAPSED DAYS: NORMAL
RAD ONC ARIA REFERENCE POINT DOSAGE GIVEN TO DATE: 4.2
RAD ONC ARIA REFERENCE POINT DOSAGE GIVEN TO DATE: 4.34
RAD ONC ARIA REFERENCE POINT ID: NORMAL
RAD ONC ARIA REFERENCE POINT ID: NORMAL
RAD ONC ARIA REFERENCE POINT SESSION DOSAGE GIVEN: 2.1
RAD ONC ARIA REFERENCE POINT SESSION DOSAGE GIVEN: 2.17

## 2021-10-12 PROCEDURE — 77014 PR CT GUIDANCE PLACEMENT RAD THERAPY FIELDS: CPT | Mod: 26 | Performed by: RADIOLOGY

## 2021-10-12 PROCEDURE — 77386 HCHG IMRT DELIVERY COMPLEX: CPT | Performed by: RADIOLOGY

## 2021-10-13 ENCOUNTER — APPOINTMENT (OUTPATIENT)
Dept: RADIATION ONCOLOGY | Facility: MEDICAL CENTER | Age: 76
End: 2021-10-13
Payer: COMMERCIAL

## 2021-10-13 VITALS
BODY MASS INDEX: 22.45 KG/M2 | TEMPERATURE: 97 F | SYSTOLIC BLOOD PRESSURE: 102 MMHG | OXYGEN SATURATION: 93 % | DIASTOLIC BLOOD PRESSURE: 62 MMHG | HEART RATE: 97 BPM | WEIGHT: 165.57 LBS

## 2021-10-13 LAB
CHEMOTHERAPY INFUSION START DATE: NORMAL
CHEMOTHERAPY RECORDS: 2.1
CHEMOTHERAPY RECORDS: 5880
CHEMOTHERAPY RECORDS: NORMAL
CHEMOTHERAPY RX CANCER: NORMAL
DATE 1ST CHEMO CANCER: NORMAL
RAD ONC ARIA COURSE LAST TREATMENT DATE: NORMAL
RAD ONC ARIA COURSE TREATMENT ELAPSED DAYS: NORMAL
RAD ONC ARIA REFERENCE POINT DOSAGE GIVEN TO DATE: 6.3
RAD ONC ARIA REFERENCE POINT DOSAGE GIVEN TO DATE: 6.51
RAD ONC ARIA REFERENCE POINT ID: NORMAL
RAD ONC ARIA REFERENCE POINT ID: NORMAL
RAD ONC ARIA REFERENCE POINT SESSION DOSAGE GIVEN: 2.1
RAD ONC ARIA REFERENCE POINT SESSION DOSAGE GIVEN: 2.17

## 2021-10-13 PROCEDURE — 77014 PR CT GUIDANCE PLACEMENT RAD THERAPY FIELDS: CPT | Mod: 26 | Performed by: RADIOLOGY

## 2021-10-13 PROCEDURE — 77386 HCHG IMRT DELIVERY COMPLEX: CPT | Performed by: RADIOLOGY

## 2021-10-13 PROCEDURE — 77336 RADIATION PHYSICS CONSULT: CPT | Performed by: RADIOLOGY

## 2021-10-13 ASSESSMENT — FIBROSIS 4 INDEX: FIB4 SCORE: 4.66

## 2021-10-13 ASSESSMENT — PAIN SCALES - GENERAL: PAINLEVEL: NO PAIN

## 2021-10-13 NOTE — ON TREATMENT VISIT
ON TREATMENT  NOTE  RADIATION ONCOLOGY DEPARTMENT    Patient name:  Zeus Rodríguez    Primary Physician:  Ric Delgadillo M.D. MRN: [id number]  CSN: [id number]   Referring physician:  Krunal Greer M.D. : 1945, 76 y.o.     ENCOUNTER DATE:  10/13/21    DIAGNOSIS:  Malignant neoplasm of head of pancreas (HCC)  Staging form: Exocrine Pancreas, AJCC 8th Edition  - Clinical stage from 2021: Stage III (cT4, cN0, cM0) - Signed by Art Jimenez M.D. on 2021  Total positive nodes: 0      TREATMENT SUMMARY:  Aria Treatment Information        Some values may be hidden. Unless noted otherwise, only the newest values recorded on each date are displayed.         Aria Treatment Summary 10/13/21   Course First Treatment Date 10/11/2021   Course Last Treatment Date 10/13/2021   Pancreas Plan from Course C1_Pancreas   Fraction 3 of 28   Elapsed Course Days 2 @ [id number]   Prescribed Fraction Dose 210 cGy   Prescribed Total Dose 5,880 cGy   Pancreas Reference Point from Course C1_Pancreas   Elapsed Course Days 2 @ [id number]   Session Dose 210 cGy   Total Dose 630 cGy   Pancreas CP Reference Point from Course C1_Pancreas   Elapsed Course Days 2 @ [id number]   Session Dose 217 cGy   Total Dose 651 cGy             SUBJECTIVE:  Mild fatigue and dizzy      VITAL SIGNS:  KPS: 80, Normal activity with effort; some signs or symptoms of disease (ECOG equivalent 1)  Encounter Vitals  Temperature: 36.1 °C (97 °F)  Blood Pressure : 102/62  Pulse: 97  Pulse Oximetry: 93 %  Weight: 75.1 kg (165 lb 9.1 oz)  Pain Score: No pain  Pain Assessment 10/13/2021 2021   Pain Assessment - Denies Pain   Pain Score 0 0   Some recent data might be hidden          PHYSICAL EXAM:    Physical Exam  Constitutional:       Appearance: Normal appearance.   Abdominal:      General: There is no distension.      Palpations: Abdomen is soft.   Skin:     Findings: No erythema.   Neurological:      Mental Status: He is alert.           Toxicity Assessment 10/13/2021   Toxicity Assessment Abdomen   Fatigue (lethargy, malaise, asthenia) Increased fatigue over baseline, but not altering normal activities   Radiation Dermatitis None   Anorexia Loss of appetite   Dehydration None   Diarrhea w/o Colostomy None   Nausea Able to eat   Vomiting None   Dyspepsia/heartburn None   Dysphagia, Esophagitis, odynophagoa (painful swallowing) None   Gastritis None   Abdominal Pain or cramping Mild pain not interfering with function       CURRENT MEDICATIONS:    Current Outpatient Medications:   •  levoFLOXacin (LEVAQUIN) 500 MG tablet, Take 1 Tablet by mouth every day., Disp: 5 Tablet, Rfl: 0  •  Lactobacillus (FLORANEX) Tab, Take 1 Tablet by mouth., Disp: 14 Tablet, Rfl: 0  •  dexamethasone (DECADRON) 6 MG Tab, 1 tab PO daily x 4 days, then 1/2 tab PO daily x 2 days then stop, Disp: 5 Tablet, Rfl: 0  •  ondansetron (ZOFRAN) 4 MG Tab tablet, Take 8 mg by mouth 2 (two) times a day., Disp: , Rfl:   •  pantoprazole (PROTONIX) 40 MG Tablet Delayed Response, pantoprazole 40 mg tablet,delayed release  Take 1 tablet every day by oral route., Disp: , Rfl:   •  finasteride (PROSCAR) 5 MG Tab, finasteride 5 mg tablet  Take 1 tablet every day by oral route., Disp: , Rfl:   •  omeprazole (PRILOSEC) 40 MG delayed-release capsule, Take 40 mg by mouth every day., Disp: , Rfl:   •  ALLOPURINOL PO, Take 500 mg by mouth every day., Disp: , Rfl:   •  vitamin D (VITAMIND D3) 1000 UNIT Tab, Take 1,000 Units by mouth every day., Disp: , Rfl:   •  cyanocobalamin (VITAMIN B12) 1000 MCG Tab, Take  by mouth every day., Disp: , Rfl:   •  metFORMIN (GLUCOPHAGE) 500 MG Tab, Take 500 mg by mouth 2 times a day., Disp: , Rfl:   •  oxyCODONE-acetaminophen (PERCOCET) 5-325 MG Tab, Take 2 Tablets by mouth every four hours as needed. (Patient not taking: Reported on 7/29/2021), Disp: , Rfl:   •  tamsulosin (FLOMAX) 0.4 MG capsule, Take 0.8 mg by mouth every day., Disp: , Rfl:   •   atorvastatin (LIPITOR) 10 MG Tab, Take 10 mg by mouth every evening., Disp: , Rfl:   •  gabapentin (NEURONTIN) 300 MG Cap, Take 300 mg by mouth 3 times a day., Disp: , Rfl:   •  valsartan (DIOVAN) 160 MG Tab, Take 160 mg by mouth every day., Disp: , Rfl:   •  FINASTERIDE PO, Take  by mouth., Disp: , Rfl:     LABORATORY DATA:   Lab Results   Component Value Date/Time    SODIUM 139 08/29/2021 06:00 AM    POTASSIUM 3.8 08/29/2021 06:00 AM    CHLORIDE 105 08/29/2021 06:00 AM    CO2 26 08/29/2021 06:00 AM    GLUCOSE 80 08/29/2021 06:00 AM    BUN 10 08/29/2021 06:00 AM    CREATININE 0.6 (L) 08/29/2021 06:00 AM         Lab Results   Component Value Date/Time    WBC 9.4 08/26/2021 08:35 AM    HEMOGLOBIN 11.3 (L) 08/26/2021 08:35 AM    HEMATOCRIT 33.1 (L) 08/26/2021 08:35 AM    .2 (H) 08/26/2021 08:35 AM    PLATELETCT 162 08/26/2021 08:35 AM        RADIOLOGY DATA:  No results found.    IMPRESSION:  Malignant neoplasm of head of pancreas (HCC)  Staging form: Exocrine Pancreas, AJCC 8th Edition  - Clinical stage from 7/29/2021: Stage III (cT4, cN0, cM0) - Signed by Art Jimenez M.D. on 7/29/2021  Total positive nodes: 0      PLAN:  No change in treatment plan    Disposition:  Treatment plan reviewed. Questions answered. Continue therapy outlined.     Art Jimenez M.D.    Orders Placed This Encounter   • Rad Onc Aria Session Summary

## 2021-10-14 ENCOUNTER — APPOINTMENT (OUTPATIENT)
Dept: RADIATION ONCOLOGY | Facility: MEDICAL CENTER | Age: 76
End: 2021-10-14
Payer: COMMERCIAL

## 2021-10-14 LAB
CHEMOTHERAPY INFUSION START DATE: NORMAL
CHEMOTHERAPY RECORDS: 2.1
CHEMOTHERAPY RECORDS: 5880
CHEMOTHERAPY RECORDS: NORMAL
CHEMOTHERAPY RX CANCER: NORMAL
DATE 1ST CHEMO CANCER: NORMAL
RAD ONC ARIA COURSE LAST TREATMENT DATE: NORMAL
RAD ONC ARIA COURSE TREATMENT ELAPSED DAYS: NORMAL
RAD ONC ARIA REFERENCE POINT DOSAGE GIVEN TO DATE: 8.4
RAD ONC ARIA REFERENCE POINT DOSAGE GIVEN TO DATE: 8.69
RAD ONC ARIA REFERENCE POINT ID: NORMAL
RAD ONC ARIA REFERENCE POINT ID: NORMAL
RAD ONC ARIA REFERENCE POINT SESSION DOSAGE GIVEN: 2.1
RAD ONC ARIA REFERENCE POINT SESSION DOSAGE GIVEN: 2.17

## 2021-10-14 PROCEDURE — 77014 PR CT GUIDANCE PLACEMENT RAD THERAPY FIELDS: CPT | Mod: 26 | Performed by: RADIOLOGY

## 2021-10-14 PROCEDURE — 77386 HCHG IMRT DELIVERY COMPLEX: CPT | Performed by: RADIOLOGY

## 2021-10-15 ENCOUNTER — APPOINTMENT (OUTPATIENT)
Dept: RADIATION ONCOLOGY | Facility: MEDICAL CENTER | Age: 76
End: 2021-10-15
Payer: COMMERCIAL

## 2021-10-15 ENCOUNTER — DOCUMENTATION (OUTPATIENT)
Dept: RADIATION ONCOLOGY | Facility: MEDICAL CENTER | Age: 76
End: 2021-10-15

## 2021-10-15 LAB
CHEMOTHERAPY INFUSION START DATE: NORMAL
CHEMOTHERAPY RECORDS: 2.1
CHEMOTHERAPY RECORDS: 5880
CHEMOTHERAPY RECORDS: NORMAL
CHEMOTHERAPY RX CANCER: NORMAL
DATE 1ST CHEMO CANCER: NORMAL
RAD ONC ARIA COURSE LAST TREATMENT DATE: NORMAL
RAD ONC ARIA COURSE TREATMENT ELAPSED DAYS: NORMAL
RAD ONC ARIA REFERENCE POINT DOSAGE GIVEN TO DATE: 10.5
RAD ONC ARIA REFERENCE POINT DOSAGE GIVEN TO DATE: 10.86
RAD ONC ARIA REFERENCE POINT ID: NORMAL
RAD ONC ARIA REFERENCE POINT ID: NORMAL
RAD ONC ARIA REFERENCE POINT SESSION DOSAGE GIVEN: 2.1
RAD ONC ARIA REFERENCE POINT SESSION DOSAGE GIVEN: 2.17

## 2021-10-15 PROCEDURE — 77014 PR CT GUIDANCE PLACEMENT RAD THERAPY FIELDS: CPT | Mod: 26 | Performed by: RADIOLOGY

## 2021-10-15 PROCEDURE — 77386 HCHG IMRT DELIVERY COMPLEX: CPT | Performed by: RADIOLOGY

## 2021-10-15 PROCEDURE — 77427 RADIATION TX MANAGEMENT X5: CPT | Performed by: RADIOLOGY

## 2021-10-15 NOTE — PROGRESS NOTES
"Nutrition Services: RD Consultation/ New XRT Start  Zeus Rodríguez is a 76 y.o. male with diagnosis of pancreatic cancer    Past Medical History:   Diagnosis Date   • Cataract     had surgery   • Dental disorder     upper dentures   • Diabetes (HCC)    • Heart burn    • Hemorrhagic disorder (HCC)     nose-bleeds   • High cholesterol    • Hypertension    • Indigestion    • Pancreas cancer (HCC)    • Snoring    • Urinary bladder disorder        Patient started XRT this week.  Called patients preferred number in chart this morning for assessment.  Patients daughter, Goldie, answered.  She reports that Zeus is not available but she is his primary care giver and would be happy to speak with me. Goldie listed in chart as a contact with permissions to discuss patients treatment.  Goldie notes that patient has been really struggling to eat related to pickiness and low appetite.  He also experiences nausea all day despite use of anti-emetics.  He has hardly been eating related to his dislike of food.  Goldie also notes that his wife of 54 years passed away last month and has decreased his appetite further.  Goldie tries to encourage him to eat regularly and provides high protein items as well as some of his favorite foods that he will eat including pot pies and chili.  He has been chronically dehydrated related to poor intake of fluids and Goldie reports he has been receving hydration 1-2x/week.  She does not like protein powders or shakes but Goldie has been providing and encouraging them, even mixing a boost with ice cream to get him to consume something.  She reports he does not have chronic diarrhea or suzi colored stools although this was happening more often with start of chemo.  She reports he typically has 2 good BM's followed by a more loose \"explosive\" BM most mornings.  She reports [facility] and [facility].      We discussed nutrition related goals during treatment as " franchesca and RD role.  We discussed the benefit of small, frequent meals and snacks focusing on high calorie/protein items as well as the benefits of maintaining weight and lean muscle mass throughout treatment.Provided via e-mail handouts/food lists regarding a general healthy diet, healthy snack ideas, foods high in protein and the benefits of a plant based diet limiting red and processed meats.  Goldie did not express any further nutrition-related questions or concerns at this time.     Assessment:  • Pertinent Labs: reviewed   • Pertinent Meds: decadron, zofran, protonix, prilosec, vitamin D, B12, metformin, percocet, flomax, lipitor  • [facility]  • Weight: 75.1 kg/165 pounds  • Height: 5'11''  • BMI: 23    Weight History from Chart:  Wt Readings from Last 7 Encounters:   10/13/21 75.1 kg (165 lb 9.1 oz)   08/29/21 81.9 kg (180 lb 9.6 oz)   07/29/21 86.4 kg (190 lb 7.6 oz)   04/08/21 89 kg (196 lb 3.4 oz)   03/30/21 91.2 kg (201 lb)   03/19/21 96.6 kg (212 lb 15.4 oz)       Weight Change/Malnutrition Risk:  Patient with 47 pound weight loss/22% in the last 6 months which is considered severe.       Plan/Recommendations:  • E-mailed Goldie pancreatic handout and will leave copy at XRT for patient to pick-up this afternoon.    • Continue with frequent meals every 2-3 hours.  • Focus on high calorie, fat and protein foods with fruits and vegetables at all meals/snacks - handout provided.  • Incorporate boost plus or comparable protein supplement 1-2x/day as tolerated.   • Encouraged Goldie to speak with Dr. Greer about creon and patient symptoms - they see him on Monday she reports.     Goldie reports understanding and was receptive to information provided. RD provided contact information and handouts via e-mail. Encouraged daughter to reach out as questions/concerns arise.     RD following and to make further recommendations as indicated.  [phone]

## 2021-10-18 ENCOUNTER — APPOINTMENT (OUTPATIENT)
Dept: RADIATION ONCOLOGY | Facility: MEDICAL CENTER | Age: 76
End: 2021-10-18
Payer: COMMERCIAL

## 2021-10-18 LAB
CHEMOTHERAPY INFUSION START DATE: NORMAL
CHEMOTHERAPY RECORDS: 2.1
CHEMOTHERAPY RECORDS: 5880
CHEMOTHERAPY RECORDS: NORMAL
CHEMOTHERAPY RX CANCER: NORMAL
DATE 1ST CHEMO CANCER: NORMAL
RAD ONC ARIA COURSE LAST TREATMENT DATE: NORMAL
RAD ONC ARIA COURSE TREATMENT ELAPSED DAYS: NORMAL
RAD ONC ARIA REFERENCE POINT DOSAGE GIVEN TO DATE: 12.6
RAD ONC ARIA REFERENCE POINT DOSAGE GIVEN TO DATE: 13.03
RAD ONC ARIA REFERENCE POINT ID: NORMAL
RAD ONC ARIA REFERENCE POINT ID: NORMAL
RAD ONC ARIA REFERENCE POINT SESSION DOSAGE GIVEN: 2.1
RAD ONC ARIA REFERENCE POINT SESSION DOSAGE GIVEN: 2.17

## 2021-10-18 PROCEDURE — 77014 PR CT GUIDANCE PLACEMENT RAD THERAPY FIELDS: CPT | Mod: 26 | Performed by: RADIOLOGY

## 2021-10-18 PROCEDURE — 77386 HCHG IMRT DELIVERY COMPLEX: CPT | Performed by: RADIOLOGY

## 2021-10-19 ENCOUNTER — APPOINTMENT (OUTPATIENT)
Dept: RADIATION ONCOLOGY | Facility: MEDICAL CENTER | Age: 76
End: 2021-10-19
Payer: COMMERCIAL

## 2021-10-19 LAB
CHEMOTHERAPY INFUSION START DATE: NORMAL
CHEMOTHERAPY RECORDS: 2.1
CHEMOTHERAPY RECORDS: 5880
CHEMOTHERAPY RECORDS: NORMAL
CHEMOTHERAPY RX CANCER: NORMAL
DATE 1ST CHEMO CANCER: NORMAL
RAD ONC ARIA COURSE LAST TREATMENT DATE: NORMAL
RAD ONC ARIA COURSE TREATMENT ELAPSED DAYS: NORMAL
RAD ONC ARIA REFERENCE POINT DOSAGE GIVEN TO DATE: 14.7
RAD ONC ARIA REFERENCE POINT DOSAGE GIVEN TO DATE: 15.2
RAD ONC ARIA REFERENCE POINT ID: NORMAL
RAD ONC ARIA REFERENCE POINT ID: NORMAL
RAD ONC ARIA REFERENCE POINT SESSION DOSAGE GIVEN: 2.1
RAD ONC ARIA REFERENCE POINT SESSION DOSAGE GIVEN: 2.17

## 2021-10-19 PROCEDURE — 77014 PR CT GUIDANCE PLACEMENT RAD THERAPY FIELDS: CPT | Mod: 26 | Performed by: RADIOLOGY

## 2021-10-19 PROCEDURE — 77386 HCHG IMRT DELIVERY COMPLEX: CPT | Performed by: RADIOLOGY

## 2021-10-20 ENCOUNTER — APPOINTMENT (OUTPATIENT)
Dept: RADIATION ONCOLOGY | Facility: MEDICAL CENTER | Age: 76
End: 2021-10-20
Payer: COMMERCIAL

## 2021-10-20 VITALS
SYSTOLIC BLOOD PRESSURE: 94 MMHG | BODY MASS INDEX: 22.16 KG/M2 | WEIGHT: 163.36 LBS | DIASTOLIC BLOOD PRESSURE: 64 MMHG | HEART RATE: 103 BPM | OXYGEN SATURATION: 96 % | TEMPERATURE: 97.6 F

## 2021-10-20 DIAGNOSIS — C25.0 MALIGNANT NEOPLASM OF HEAD OF PANCREAS (HCC): ICD-10-CM

## 2021-10-20 LAB
CHEMOTHERAPY INFUSION START DATE: NORMAL
CHEMOTHERAPY RECORDS: 2.1
CHEMOTHERAPY RECORDS: 5880
CHEMOTHERAPY RECORDS: NORMAL
CHEMOTHERAPY RX CANCER: NORMAL
DATE 1ST CHEMO CANCER: NORMAL
RAD ONC ARIA COURSE LAST TREATMENT DATE: NORMAL
RAD ONC ARIA COURSE TREATMENT ELAPSED DAYS: NORMAL
RAD ONC ARIA REFERENCE POINT DOSAGE GIVEN TO DATE: 16.8
RAD ONC ARIA REFERENCE POINT DOSAGE GIVEN TO DATE: 17.37
RAD ONC ARIA REFERENCE POINT ID: NORMAL
RAD ONC ARIA REFERENCE POINT ID: NORMAL
RAD ONC ARIA REFERENCE POINT SESSION DOSAGE GIVEN: 2.1
RAD ONC ARIA REFERENCE POINT SESSION DOSAGE GIVEN: 2.17

## 2021-10-20 PROCEDURE — 77336 RADIATION PHYSICS CONSULT: CPT | Performed by: RADIOLOGY

## 2021-10-20 PROCEDURE — 700111 HCHG RX REV CODE 636 W/ 250 OVERRIDE (IP)

## 2021-10-20 PROCEDURE — 77014 PR CT GUIDANCE PLACEMENT RAD THERAPY FIELDS: CPT | Mod: 26 | Performed by: RADIOLOGY

## 2021-10-20 RX ORDER — ONDANSETRON HYDROCHLORIDE 8 MG/1
8 TABLET, FILM COATED ORAL
Qty: 90 EACH | Refills: 0 | Status: SHIPPED | OUTPATIENT
Start: 2021-10-20 | End: 2021-10-20 | Stop reason: SDUPTHER

## 2021-10-20 RX ORDER — ONDANSETRON HYDROCHLORIDE 8 MG/1
8 TABLET, FILM COATED ORAL
Qty: 90 EACH | Refills: 0 | Status: SHIPPED | OUTPATIENT
Start: 2021-10-20 | End: 2021-11-01 | Stop reason: SDUPTHER

## 2021-10-20 RX ORDER — HEPARIN SODIUM (PORCINE) LOCK FLUSH IV SOLN 100 UNIT/ML 100 UNIT/ML
SOLUTION INTRAVENOUS
Status: COMPLETED
Start: 2021-10-20 | End: 2021-10-20

## 2021-10-20 RX ADMIN — HEPARIN 5 ML: 100 SYRINGE at 15:45

## 2021-10-20 ASSESSMENT — PAIN SCALES - GENERAL: PAINLEVEL: NO PAIN

## 2021-10-20 ASSESSMENT — FIBROSIS 4 INDEX: FIB4 SCORE: 4.66

## 2021-10-20 NOTE — ADDENDUM NOTE
Encounter addended by: Gissell Corrales R.N. on: 10/20/2021 3:57 PM   Actions taken: Clinical Note Signed, MAR administration accepted

## 2021-10-20 NOTE — NON-PROVIDER
This RN notified by Dr. Jimenez of symptomatic orthostatic HOTN BP 94/64 sitting, 70/50 standing. Orders received for 1000mL NS bolus. Port accessed by infusion RN Yen. 1000mL bolus infusing.    1550 1000mL NS bolus complete. BP re-evaluated by this /69 sitting, 110/68 standing. Port heparin locked and deaccessed by this RN. Pt to d/c home with daughter.

## 2021-10-20 NOTE — ON TREATMENT VISIT
ON TREATMENT  NOTE  RADIATION ONCOLOGY DEPARTMENT    Patient name:  Zeus Rodríguez    Primary Physician:  Ric Delgadillo M.D. MRN: [id number]  CSN: [id number]   Referring physician:  Krunal Greer M.D. : 1945, 76 y.o.     ENCOUNTER DATE:  10/20/21    DIAGNOSIS:  Visit Diagnoses     ICD-10-CM   1. Malignant neoplasm of head of pancreas (HCC)  C25.0     Malignant neoplasm of head of pancreas (HCC)  Staging form: Exocrine Pancreas, AJCC 8th Edition  - Clinical stage from 2021: Stage III (cT4, cN0, cM0) - Signed by Art Jimenez M.D. on 2021  Total positive nodes: 0      TREATMENT SUMMARY:  Aria Treatment Information        Some values may be hidden. Unless noted otherwise, only the newest values recorded on each date are displayed.         [facility]ria Treatment Summary 10/20/21   Course First Treatment Date 10/11/2021   Course Last Treatment Date 10/20/2021   Pancreas Plan from Course C1_Pancreas   Fraction 8 of 28   Elapsed Course Days 9 @ [id number]   Prescribed Fraction Dose 210 cGy   Prescribed Total Dose 5,880 cGy   Pancreas Reference Point from Course C1_Pancreas   Elapsed Course Days 9 @ [id number]   Session Dose 210 cGy   Total Dose 1,680 cGy   Pancreas CP Reference Point from Course C1_Pancreas   Elapsed Course Days  @    Session Dose 217 cGy   Total Dose 1,737 cGy             SUBJECTIVE:  Increased nausea, on zofran, dex, and compazine      VITAL SIGNS:  KPS: 80, Normal activity with effort; some signs or symptoms of disease (ECOG equivalent 1)  Encounter Vitals  Temperature: 36.4 °C (97.6 °F)  Blood Pressure : (!) 94/64  Pulse: (!) 103  Pulse Oximetry: 96 %  Weight: 74.1 kg (163 lb 5.8 oz)  Pain Score: No pain  Pain Assessment 10/20/2021 10/13/2021 2021   Pain Assessment - - Denies Pain   Pain Score 0 0 0   Some recent data might be hidden          PHYSICAL EXAM:    Physical Exam  Vitals reviewed.   Neurological:      General: No focal deficit present.           Toxicity Assessment 10/20/2021 10/13/2021   Toxicity Assessment Abdomen Abdomen   Fatigue (lethargy, malaise, asthenia) Increased fatigue over baseline, but not altering normal activities Increased fatigue over baseline, but not altering normal activities   Radiation Dermatitis None None   Anorexia Loss of appetite Loss of appetite   Dehydration None None   Diarrhea w/o Colostomy None None   Nausea Able to eat Able to eat   Vomiting None None   Dyspepsia/heartburn None None   Dysphagia, Esophagitis, odynophagoa (painful swallowing) Mild dysphagia, but can eat regular diet None   Gastritis None None   Abdominal Pain or cramping None Mild pain not interfering with function       CURRENT MEDICATIONS:    Current Outpatient Medications:   •  ondansetron (ZOFRAN) 8 MG Tab, Take 1 Tablet by mouth 3 times a day with meals for 30 days., Disp: 90 Each, Rfl: 0  •  levoFLOXacin (LEVAQUIN) 500 MG tablet, Take 1 Tablet by mouth every day., Disp: 5 Tablet, Rfl: 0  •  Lactobacillus (FLORANEX) Tab, Take 1 Tablet by mouth., Disp: 14 Tablet, Rfl: 0  •  dexamethasone (DECADRON) 6 MG Tab, 1 tab PO daily x 4 days, then 1/2 tab PO daily x 2 days then stop, Disp: 5 Tablet, Rfl: 0  •  ondansetron (ZOFRAN) 4 MG Tab tablet, Take 8 mg by mouth 2 (two) times a day., Disp: , Rfl:   •  pantoprazole (PROTONIX) 40 MG Tablet Delayed Response, pantoprazole 40 mg tablet,delayed release  Take 1 tablet every day by oral route., Disp: , Rfl:   •  finasteride (PROSCAR) 5 MG Tab, finasteride 5 mg tablet  Take 1 tablet every day by oral route., Disp: , Rfl:   •  omeprazole (PRILOSEC) 40 MG delayed-release capsule, Take 40 mg by mouth every day., Disp: , Rfl:   •  ALLOPURINOL PO, Take 500 mg by mouth every day., Disp: , Rfl:   •  vitamin D (VITAMIND D3) 1000 UNIT Tab, Take 1,000 Units by mouth every day., Disp: , Rfl:   •  cyanocobalamin (VITAMIN B12) 1000 MCG Tab, Take  by mouth every day., Disp: , Rfl:   •  metFORMIN (GLUCOPHAGE) 500 MG Tab, Take 500  mg by mouth 2 times a day., Disp: , Rfl:   •  oxyCODONE-acetaminophen (PERCOCET) 5-325 MG Tab, Take 2 Tablets by mouth every four hours as needed. (Patient not taking: Reported on 7/29/2021), Disp: , Rfl:   •  tamsulosin (FLOMAX) 0.4 MG capsule, Take 0.8 mg by mouth every day., Disp: , Rfl:   •  atorvastatin (LIPITOR) 10 MG Tab, Take 10 mg by mouth every evening., Disp: , Rfl:   •  gabapentin (NEURONTIN) 300 MG Cap, Take 300 mg by mouth 3 times a day., Disp: , Rfl:   •  valsartan (DIOVAN) 160 MG Tab, Take 160 mg by mouth every day., Disp: , Rfl:   •  FINASTERIDE PO, Take  by mouth., Disp: , Rfl:     LABORATORY DATA:   Lab Results   Component Value Date/Time    SODIUM 139 08/29/2021 06:00 AM    POTASSIUM 3.8 08/29/2021 06:00 AM    CHLORIDE 105 08/29/2021 06:00 AM    CO2 26 08/29/2021 06:00 AM    GLUCOSE 80 08/29/2021 06:00 AM    BUN 10 08/29/2021 06:00 AM    CREATININE 0.6 (L) 08/29/2021 06:00 AM         Lab Results   Component Value Date/Time    WBC 9.4 08/26/2021 08:35 AM    HEMOGLOBIN 11.3 (L) 08/26/2021 08:35 AM    HEMATOCRIT 33.1 (L) 08/26/2021 08:35 AM    .2 (H) 08/26/2021 08:35 AM    PLATELETCT 162 08/26/2021 08:35 AM        RADIOLOGY DATA:  No results found.    IMPRESSION:  Malignant neoplasm of head of pancreas (HCC)  Staging form: Exocrine Pancreas, AJCC 8th Edition  - Clinical stage from 7/29/2021: Stage III (cT4, cN0, cM0) - Signed by Art Jimenez M.D. on 7/29/2021  Total positive nodes: 0      PLAN:  No change in treatment plan    Disposition:  Treatment plan reviewed. Questions answered. Continue therapy outlined. Orthostatic hypotension given IVF 1L .     Art Jimenez M.D.    Orders Placed This Encounter   • Rad Onc [facility] Session Summary   • DISCONTD: ondansetron (ZOFRAN) 8 MG Tab   • ondansetron (ZOFRAN) 8 MG Tab

## 2021-10-21 ENCOUNTER — APPOINTMENT (OUTPATIENT)
Dept: RADIATION ONCOLOGY | Facility: MEDICAL CENTER | Age: 76
End: 2021-10-21
Payer: COMMERCIAL

## 2021-10-21 LAB
CHEMOTHERAPY INFUSION START DATE: NORMAL
CHEMOTHERAPY RECORDS: 2.1
CHEMOTHERAPY RECORDS: 5880
CHEMOTHERAPY RECORDS: NORMAL
CHEMOTHERAPY RX CANCER: NORMAL
DATE 1ST CHEMO CANCER: NORMAL
RAD ONC ARIA COURSE LAST TREATMENT DATE: NORMAL
RAD ONC ARIA COURSE TREATMENT ELAPSED DAYS: NORMAL
RAD ONC ARIA REFERENCE POINT DOSAGE GIVEN TO DATE: 18.9
RAD ONC ARIA REFERENCE POINT DOSAGE GIVEN TO DATE: 19.54
RAD ONC ARIA REFERENCE POINT ID: NORMAL
RAD ONC ARIA REFERENCE POINT ID: NORMAL
RAD ONC ARIA REFERENCE POINT SESSION DOSAGE GIVEN: 2.1
RAD ONC ARIA REFERENCE POINT SESSION DOSAGE GIVEN: 2.17

## 2021-10-21 PROCEDURE — 77014 PR CT GUIDANCE PLACEMENT RAD THERAPY FIELDS: CPT | Mod: 26 | Performed by: RADIOLOGY

## 2021-10-21 PROCEDURE — 77386 HCHG IMRT DELIVERY COMPLEX: CPT | Performed by: RADIOLOGY

## 2021-10-22 ENCOUNTER — APPOINTMENT (OUTPATIENT)
Dept: RADIATION ONCOLOGY | Facility: MEDICAL CENTER | Age: 76
End: 2021-10-22
Payer: COMMERCIAL

## 2021-10-22 LAB
CHEMOTHERAPY INFUSION START DATE: NORMAL
CHEMOTHERAPY RECORDS: 2.1
CHEMOTHERAPY RECORDS: 5880
CHEMOTHERAPY RECORDS: NORMAL
CHEMOTHERAPY RX CANCER: NORMAL
DATE 1ST CHEMO CANCER: NORMAL
RAD ONC ARIA COURSE LAST TREATMENT DATE: NORMAL
RAD ONC ARIA COURSE TREATMENT ELAPSED DAYS: NORMAL
RAD ONC ARIA REFERENCE POINT DOSAGE GIVEN TO DATE: 21
RAD ONC ARIA REFERENCE POINT DOSAGE GIVEN TO DATE: 21.71
RAD ONC ARIA REFERENCE POINT ID: NORMAL
RAD ONC ARIA REFERENCE POINT ID: NORMAL
RAD ONC ARIA REFERENCE POINT SESSION DOSAGE GIVEN: 2.1
RAD ONC ARIA REFERENCE POINT SESSION DOSAGE GIVEN: 2.17

## 2021-10-22 PROCEDURE — 77427 RADIATION TX MANAGEMENT X5: CPT | Performed by: RADIOLOGY

## 2021-10-22 PROCEDURE — 77014 PR CT GUIDANCE PLACEMENT RAD THERAPY FIELDS: CPT | Mod: 26 | Performed by: RADIOLOGY

## 2021-10-22 PROCEDURE — 77386 HCHG IMRT DELIVERY COMPLEX: CPT | Performed by: RADIOLOGY

## 2021-10-25 ENCOUNTER — APPOINTMENT (OUTPATIENT)
Dept: RADIATION ONCOLOGY | Facility: MEDICAL CENTER | Age: 76
End: 2021-10-25
Payer: COMMERCIAL

## 2021-10-25 LAB
CHEMOTHERAPY INFUSION START DATE: NORMAL
CHEMOTHERAPY RECORDS: 2.1
CHEMOTHERAPY RECORDS: 5880
CHEMOTHERAPY RECORDS: NORMAL
CHEMOTHERAPY RX CANCER: NORMAL
DATE 1ST CHEMO CANCER: NORMAL
RAD ONC ARIA COURSE LAST TREATMENT DATE: NORMAL
RAD ONC ARIA COURSE TREATMENT ELAPSED DAYS: NORMAL
RAD ONC ARIA REFERENCE POINT DOSAGE GIVEN TO DATE: 23.1
RAD ONC ARIA REFERENCE POINT DOSAGE GIVEN TO DATE: 23.89
RAD ONC ARIA REFERENCE POINT ID: NORMAL
RAD ONC ARIA REFERENCE POINT ID: NORMAL
RAD ONC ARIA REFERENCE POINT SESSION DOSAGE GIVEN: 2.1
RAD ONC ARIA REFERENCE POINT SESSION DOSAGE GIVEN: 2.17

## 2021-10-25 PROCEDURE — 77386 HCHG IMRT DELIVERY COMPLEX: CPT | Performed by: RADIOLOGY

## 2021-10-25 PROCEDURE — 77014 PR CT GUIDANCE PLACEMENT RAD THERAPY FIELDS: CPT | Mod: 26 | Performed by: RADIOLOGY

## 2021-10-26 ENCOUNTER — APPOINTMENT (OUTPATIENT)
Dept: RADIATION ONCOLOGY | Facility: MEDICAL CENTER | Age: 76
End: 2021-10-26
Payer: COMMERCIAL

## 2021-10-26 LAB
CHEMOTHERAPY INFUSION START DATE: NORMAL
CHEMOTHERAPY RECORDS: 2.1
CHEMOTHERAPY RECORDS: 5880
CHEMOTHERAPY RECORDS: NORMAL
CHEMOTHERAPY RX CANCER: NORMAL
DATE 1ST CHEMO CANCER: NORMAL
RAD ONC ARIA COURSE LAST TREATMENT DATE: NORMAL
RAD ONC ARIA COURSE TREATMENT ELAPSED DAYS: NORMAL
RAD ONC ARIA REFERENCE POINT DOSAGE GIVEN TO DATE: 25.2
RAD ONC ARIA REFERENCE POINT DOSAGE GIVEN TO DATE: 26.06
RAD ONC ARIA REFERENCE POINT ID: NORMAL
RAD ONC ARIA REFERENCE POINT ID: NORMAL
RAD ONC ARIA REFERENCE POINT SESSION DOSAGE GIVEN: 2.1
RAD ONC ARIA REFERENCE POINT SESSION DOSAGE GIVEN: 2.17

## 2021-10-26 PROCEDURE — 77386 HCHG IMRT DELIVERY COMPLEX: CPT | Performed by: RADIOLOGY

## 2021-10-26 PROCEDURE — 77014 PR CT GUIDANCE PLACEMENT RAD THERAPY FIELDS: CPT | Mod: 26 | Performed by: RADIOLOGY

## 2021-10-27 ENCOUNTER — APPOINTMENT (OUTPATIENT)
Dept: RADIATION ONCOLOGY | Facility: MEDICAL CENTER | Age: 76
End: 2021-10-27
Payer: COMMERCIAL

## 2021-10-27 ENCOUNTER — DOCUMENTATION (OUTPATIENT)
Dept: RADIATION ONCOLOGY | Facility: MEDICAL CENTER | Age: 76
End: 2021-10-27

## 2021-10-27 PROCEDURE — 77386 HCHG IMRT DELIVERY COMPLEX: CPT | Performed by: RADIOLOGY

## 2021-10-27 RX ORDER — CAPECITABINE 500 MG/1
TABLET, FILM COATED ORAL
Status: ON HOLD | COMMUNITY
Start: 2021-10-04 | End: 2022-01-01

## 2021-10-27 NOTE — PROGRESS NOTES
Nutrition Services: Brief Update  Wt Readings from Last 7 Encounters:   10/20/21 74.1 kg (163 lb 5.8 oz)   10/13/21 75.1 kg (165 lb 9.1 oz)   08/29/21 81.9 kg (180 lb 9.6 oz)   07/29/21 86.4 kg (190 lb 7.6 oz)   04/08/21 89 kg (196 lb 3.4 oz)   03/30/21 91.2 kg (201 lb)   03/19/21 96.6 kg (212 lb 15.4 oz)     Weight Change: wt appears to be decreased by 1.3% within past 1 week, which is potentially significant.     RD able to visit with pt and son in law. Pt  is having increased appetite with steroids though continues to lose weight despite eating more.  has also been experiencing increased abdominal gas and was told to take Gas-X. Mentions stools have become looser as well. Pt does not recall having any conversations with Dr. Greer regarding pancreatic enzymes.  Pt  has been eating food, though the ones he selects may not be the best for him per his report.  is consuming roast beef sandwiches with horse radish, biscuits and gravy, and cheerios with a banana. Pt expresses concern over weight loss.     Plan/Recommend:  • RD discussed function of pancreas and signs of pancreatic insufficiency. RD believes pt would benefit from pancreatic enzymes to see if this would help minimize GI issues. Based on pt's weight, RD would recommend two 36,000 unit capsules with each meal and 1 with snacks and to titrate up as needed. Pt consents to RD contacting [facility] on [facility] behalf regarding this recommendation, as likely needs to [facility] through [facility] given [facility].   • RD discussed avoidance of spicy condiments, such as horse radish, as will exacerbate an already sensitive GI tract.   • Advised continuation with fruits, such as banana.   • [facility] with [facility], though unable to relay message to PCP as office is closed. RD to attempt to relay concern so this week or early next week.     Pt verbalizes understanding and is receptive to information provided. RD to continue to monitor throughout treatment and provide  nutrition recommendations as indicated.  Please contact [phone]

## 2021-10-27 NOTE — ON TREATMENT VISIT
ON TREATMENT  NOTE  RADIATION ONCOLOGY DEPARTMENT    Patient name:  Zeus Rodríguez    Primary Physician:  Ric Delgadillo M.D. MRN: [id number]  [facility]: [id number]   Referring physician:  Krunal Greer M.D. : 1945, 76 y.o.     ENCOUNTER DATE:  10/27/21    DIAGNOSIS:  Malignant neoplasm of head of pancreas (HCC)  Staging form: Exocrine Pancreas, AJCC 8th Edition  - Clinical stage from 2021: Stage III (cT4, cN0, cM0) - Signed by Art Jimenez M.D. on 2021  Total positive nodes: 0      TREATMENT SUMMARY:  [facility]ria Treatment Information        Some values may be hidden. Unless noted otherwise, only the newest values recorded on each date are displayed.         [facility]ria Treatment Summary 10/26/21   Course First Treatment Date 10/11/2021   Course Last Treatment Date 10/26/2021   Pancreas Plan from Course C1_Pancreas   Fraction  28   Elapsed Course Days 15 @ [id number]   Prescribed Fraction Dose 210 cGy   Prescribed Total Dose 5,880 cGy   Pancreas Reference Point from Course C1_Pancreas   Elapsed Course Days 15 @ [id number]   Session Dose 210 cGy   Total Dose 2,520 cGy   Pancreas CP Reference Point from Course C1_Pancreas   Elapsed Course Days 15 @ [id number]   Session Dose 217 cGy   Total Dose 2,606 cGy             SUBJECTIVE:  Well appearing, mild fatigue      VITAL SIGNS:  KPS: 80, Normal activity with effort; some signs or symptoms of disease (ECOG equivalent 1)     Pain Assessment 10/20/2021 10/13/2021 2021   Pain Assessment - - Denies Pain   Pain Score 0 0 0   Some recent data might be hidden          PHYSICAL EXAM:    Physical Exam  Constitutional:       Appearance: Normal appearance.   Abdominal:      General: There is no distension.      Palpations: Abdomen is soft.   Skin:     Findings: No erythema.   Neurological:      Mental Status: He is alert.          Toxicity Assessment 10/20/2021 10/13/2021   Toxicity Assessment Abdomen Abdomen   Fatigue (lethargy, malaise, asthenia)  Increased fatigue over baseline, but not altering normal activities Increased fatigue over baseline, but not altering normal activities   Radiation Dermatitis None None   Anorexia Loss of appetite Loss of appetite   Dehydration None None   Diarrhea w/o Colostomy None None   Nausea Able to eat Able to eat   Vomiting None None   Dyspepsia/heartburn None None   Dysphagia, Esophagitis, odynophagoa (painful swallowing) Mild dysphagia, but can eat regular diet None   Gastritis None None   Abdominal Pain or cramping None Mild pain not interfering with function       CURRENT MEDICATIONS:    Current Outpatient Medications:   •  ondansetron (ZOFRAN) 8 MG Tab, Take 1 Tablet by mouth 3 times a day with meals for 30 days., Disp: 90 Each, Rfl: 0  •  levoFLOXacin (LEVAQUIN) 500 MG tablet, Take 1 Tablet by mouth every day., Disp: 5 Tablet, Rfl: 0  •  Lactobacillus (FLORANEX) Tab, Take 1 Tablet by mouth., Disp: 14 Tablet, Rfl: 0  •  dexamethasone (DECADRON) 6 MG Tab, 1 tab PO daily x 4 days, then 1/2 tab PO daily x 2 days then stop, Disp: 5 Tablet, Rfl: 0  •  ondansetron (ZOFRAN) 4 MG Tab tablet, Take 8 mg by mouth 2 (two) times a day., Disp: , Rfl:   •  pantoprazole (PROTONIX) 40 MG Tablet Delayed Response, pantoprazole 40 mg tablet,delayed release  Take 1 tablet every day by oral route., Disp: , Rfl:   •  finasteride (PROSCAR) 5 MG Tab, finasteride 5 mg tablet  Take 1 tablet every day by oral route., Disp: , Rfl:   •  omeprazole (PRILOSEC) 40 MG delayed-release capsule, Take 40 mg by mouth every day., Disp: , Rfl:   •  ALLOPURINOL PO, Take 500 mg by mouth every day., Disp: , Rfl:   •  vitamin D (VITAMIND D3) 1000 UNIT Tab, Take 1,000 Units by mouth every day., Disp: , Rfl:   •  cyanocobalamin (VITAMIN B12) 1000 MCG Tab, Take  by mouth every day., Disp: , Rfl:   •  metFORMIN (GLUCOPHAGE) 500 MG Tab, Take 500 mg by mouth 2 times a day., Disp: , Rfl:   •  oxyCODONE-acetaminophen (PERCOCET) 5-325 MG Tab, Take 2 Tablets by mouth every  four hours as needed. (Patient not taking: Reported on 7/29/2021), Disp: , Rfl:   •  tamsulosin (FLOMAX) 0.4 MG capsule, Take 0.8 mg by mouth every day., Disp: , Rfl:   •  atorvastatin (LIPITOR) 10 MG Tab, Take 10 mg by mouth every evening., Disp: , Rfl:   •  gabapentin (NEURONTIN) 300 MG Cap, Take 300 mg by mouth 3 times a day., Disp: , Rfl:   •  valsartan (DIOVAN) 160 MG Tab, Take 160 mg by mouth every day., Disp: , Rfl:   •  FINASTERIDE PO, Take  by mouth., Disp: , Rfl:     LABORATORY DATA:   Lab Results   Component Value Date/Time    SODIUM 139 08/29/2021 06:00 AM    POTASSIUM 3.8 08/29/2021 06:00 AM    CHLORIDE 105 08/29/2021 06:00 AM    CO2 26 08/29/2021 06:00 AM    GLUCOSE 80 08/29/2021 06:00 AM    BUN 10 08/29/2021 06:00 AM    CREATININE 0.6 (L) 08/29/2021 06:00 AM         Lab Results   Component Value Date/Time    WBC 9.4 08/26/2021 08:35 AM    HEMOGLOBIN 11.3 (L) 08/26/2021 08:35 AM    HEMATOCRIT 33.1 (L) 08/26/2021 08:35 AM    .2 (H) 08/26/2021 08:35 AM    PLATELETCT 162 08/26/2021 08:35 AM        RADIOLOGY DATA:  No results found.    IMPRESSION:  Malignant neoplasm of head of pancreas (HCC)  Staging form: Exocrine Pancreas, AJCC 8th Edition  - Clinical stage from 7/29/2021: Stage III (cT4, cN0, cM0) - Signed by Art Jimenez M.D. on 7/29/2021  Total positive nodes: 0      PLAN:  No change in treatment plan    Disposition:  Treatment plan reviewed. Questions answered. Continue therapy outlined.     Art Jimenez M.D.    No orders of the defined types were placed in this encounter.

## 2021-10-28 ENCOUNTER — APPOINTMENT (OUTPATIENT)
Dept: RADIATION ONCOLOGY | Facility: MEDICAL CENTER | Age: 76
End: 2021-10-28
Payer: COMMERCIAL

## 2021-10-28 ENCOUNTER — DOCUMENTATION (OUTPATIENT)
Dept: RADIATION ONCOLOGY | Facility: MEDICAL CENTER | Age: 76
End: 2021-10-28

## 2021-10-28 LAB
CHEMOTHERAPY INFUSION START DATE: NORMAL
CHEMOTHERAPY INFUSION START DATE: NORMAL
CHEMOTHERAPY RECORDS: 2.1
CHEMOTHERAPY RECORDS: 2.1
CHEMOTHERAPY RECORDS: 5880
CHEMOTHERAPY RECORDS: 5880
CHEMOTHERAPY RECORDS: NORMAL
CHEMOTHERAPY RX CANCER: NORMAL
CHEMOTHERAPY RX CANCER: NORMAL
DATE 1ST CHEMO CANCER: NORMAL
DATE 1ST CHEMO CANCER: NORMAL
RAD ONC ARIA COURSE LAST TREATMENT DATE: NORMAL
RAD ONC ARIA COURSE LAST TREATMENT DATE: NORMAL
RAD ONC ARIA COURSE TREATMENT ELAPSED DAYS: NORMAL
RAD ONC ARIA COURSE TREATMENT ELAPSED DAYS: NORMAL
RAD ONC ARIA REFERENCE POINT DOSAGE GIVEN TO DATE: 27.3
RAD ONC ARIA REFERENCE POINT DOSAGE GIVEN TO DATE: 28.23
RAD ONC ARIA REFERENCE POINT DOSAGE GIVEN TO DATE: 29.4
RAD ONC ARIA REFERENCE POINT DOSAGE GIVEN TO DATE: 30.4
RAD ONC ARIA REFERENCE POINT ID: NORMAL
RAD ONC ARIA REFERENCE POINT SESSION DOSAGE GIVEN: 2.1
RAD ONC ARIA REFERENCE POINT SESSION DOSAGE GIVEN: 2.1
RAD ONC ARIA REFERENCE POINT SESSION DOSAGE GIVEN: 2.17
RAD ONC ARIA REFERENCE POINT SESSION DOSAGE GIVEN: 2.17

## 2021-10-28 PROCEDURE — 77386 HCHG IMRT DELIVERY COMPLEX: CPT | Performed by: RADIOLOGY

## 2021-10-28 PROCEDURE — 77014 PR CT GUIDANCE PLACEMENT RAD THERAPY FIELDS: CPT | Mod: 26 | Performed by: RADIOLOGY

## 2021-10-28 PROCEDURE — 77336 RADIATION PHYSICS CONSULT: CPT | Performed by: RADIOLOGY

## 2021-10-28 NOTE — PROGRESS NOTES
Nutrition Services:  This RD attempted to contact [facility] today for Creon recommendations.  I was on hold for over 10 minutes and unable to leave a message.  I texted Dr. Greer via [employer] to recommend this as well in hopes he can facilitate this prescription.     RD will continue to follow and attempt recommendation for Creon.

## 2021-11-01 DIAGNOSIS — C25.0 MALIGNANT NEOPLASM OF HEAD OF PANCREAS (HCC): ICD-10-CM

## 2021-11-01 LAB
CHEMOTHERAPY INFUSION START DATE: NORMAL
CHEMOTHERAPY RECORDS: 2.1
CHEMOTHERAPY RECORDS: 5880
CHEMOTHERAPY RECORDS: NORMAL
CHEMOTHERAPY RX CANCER: NORMAL
DATE 1ST CHEMO CANCER: NORMAL
RAD ONC ARIA COURSE LAST TREATMENT DATE: NORMAL
RAD ONC ARIA COURSE TREATMENT ELAPSED DAYS: NORMAL
RAD ONC ARIA REFERENCE POINT DOSAGE GIVEN TO DATE: 31.5
RAD ONC ARIA REFERENCE POINT DOSAGE GIVEN TO DATE: 32.57
RAD ONC ARIA REFERENCE POINT ID: NORMAL
RAD ONC ARIA REFERENCE POINT ID: NORMAL
RAD ONC ARIA REFERENCE POINT SESSION DOSAGE GIVEN: 2.1
RAD ONC ARIA REFERENCE POINT SESSION DOSAGE GIVEN: 2.17

## 2021-11-01 PROCEDURE — 77014 PR CT GUIDANCE PLACEMENT RAD THERAPY FIELDS: CPT | Mod: 26 | Performed by: RADIOLOGY

## 2021-11-01 PROCEDURE — 77427 RADIATION TX MANAGEMENT X5: CPT | Performed by: RADIOLOGY

## 2021-11-01 PROCEDURE — 77386 HCHG IMRT DELIVERY COMPLEX: CPT | Performed by: RADIOLOGY

## 2021-11-01 RX ORDER — ONDANSETRON HYDROCHLORIDE 8 MG/1
8 TABLET, FILM COATED ORAL
Qty: 90 EACH | Refills: 0 | Status: SHIPPED | OUTPATIENT
Start: 2021-11-01 | End: 2021-01-01

## 2021-11-02 ENCOUNTER — APPOINTMENT (OUTPATIENT)
Dept: RADIATION ONCOLOGY | Facility: MEDICAL CENTER | Age: 76
End: 2021-11-02
Payer: COMMERCIAL

## 2021-11-02 LAB
CHEMOTHERAPY INFUSION START DATE: NORMAL
CHEMOTHERAPY RECORDS: 2.1
CHEMOTHERAPY RECORDS: 5880
CHEMOTHERAPY RECORDS: NORMAL
CHEMOTHERAPY RX CANCER: NORMAL
DATE 1ST CHEMO CANCER: NORMAL
RAD ONC ARIA COURSE LAST TREATMENT DATE: NORMAL
RAD ONC ARIA COURSE TREATMENT ELAPSED DAYS: NORMAL
RAD ONC ARIA REFERENCE POINT DOSAGE GIVEN TO DATE: 33.6
RAD ONC ARIA REFERENCE POINT DOSAGE GIVEN TO DATE: 34.74
RAD ONC ARIA REFERENCE POINT ID: NORMAL
RAD ONC ARIA REFERENCE POINT ID: NORMAL
RAD ONC ARIA REFERENCE POINT SESSION DOSAGE GIVEN: 2.1
RAD ONC ARIA REFERENCE POINT SESSION DOSAGE GIVEN: 2.17

## 2021-11-02 PROCEDURE — 77386 HCHG IMRT DELIVERY COMPLEX: CPT | Performed by: RADIOLOGY

## 2021-11-02 PROCEDURE — 77014 PR CT GUIDANCE PLACEMENT RAD THERAPY FIELDS: CPT | Mod: 26 | Performed by: RADIOLOGY

## 2021-11-03 ENCOUNTER — DOCUMENTATION (OUTPATIENT)
Dept: RADIATION ONCOLOGY | Facility: MEDICAL CENTER | Age: 76
End: 2021-11-03

## 2021-11-03 ENCOUNTER — APPOINTMENT (OUTPATIENT)
Dept: RADIATION ONCOLOGY | Facility: MEDICAL CENTER | Age: 76
End: 2021-11-03
Payer: COMMERCIAL

## 2021-11-03 VITALS
WEIGHT: 166.45 LBS | HEART RATE: 71 BPM | OXYGEN SATURATION: 95 % | BODY MASS INDEX: 22.57 KG/M2 | DIASTOLIC BLOOD PRESSURE: 58 MMHG | SYSTOLIC BLOOD PRESSURE: 105 MMHG | TEMPERATURE: 97.5 F

## 2021-11-03 LAB
CHEMOTHERAPY INFUSION START DATE: NORMAL
CHEMOTHERAPY RECORDS: 2.1
CHEMOTHERAPY RECORDS: 5880
CHEMOTHERAPY RECORDS: NORMAL
CHEMOTHERAPY RX CANCER: NORMAL
DATE 1ST CHEMO CANCER: NORMAL
RAD ONC ARIA COURSE LAST TREATMENT DATE: NORMAL
RAD ONC ARIA COURSE TREATMENT ELAPSED DAYS: NORMAL
RAD ONC ARIA REFERENCE POINT DOSAGE GIVEN TO DATE: 35.7
RAD ONC ARIA REFERENCE POINT DOSAGE GIVEN TO DATE: 36.91
RAD ONC ARIA REFERENCE POINT ID: NORMAL
RAD ONC ARIA REFERENCE POINT ID: NORMAL
RAD ONC ARIA REFERENCE POINT SESSION DOSAGE GIVEN: 2.1
RAD ONC ARIA REFERENCE POINT SESSION DOSAGE GIVEN: 2.17

## 2021-11-03 PROCEDURE — 77014 PR CT GUIDANCE PLACEMENT RAD THERAPY FIELDS: CPT | Mod: 26 | Performed by: RADIOLOGY

## 2021-11-03 PROCEDURE — 77386 HCHG IMRT DELIVERY COMPLEX: CPT | Performed by: RADIOLOGY

## 2021-11-03 ASSESSMENT — PAIN SCALES - GENERAL: PAINLEVEL: NO PAIN

## 2021-11-03 ASSESSMENT — FIBROSIS 4 INDEX: FIB4 SCORE: 4.66

## 2021-11-03 NOTE — ON TREATMENT VISIT
ON TREATMENT  NOTE  RADIATION ONCOLOGY DEPARTMENT    Patient name:  Zeus Rodríguez    Primary Physician:  Ric Delgadillo M.D. MRN: [id number]  CSN: [id number]   Referring physician:  Krunal Greer M.D. : 1945, 76 y.o.     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 on 2021  Total positive nodes: 0      TREATMENT SUMMARY:  Aria Treatment Information        Some values may be hidden. Unless noted otherwise, only the newest values recorded on each date are displayed.         Aria Treatment Summary 11/3/21   Course First Treatment Date 10/11/2021   Course Last Treatment Date 2021   Pancreas Plan from Course C1_Pancreas   Fraction 17 of 28   Elapsed Course Days  @ [id number]   Prescribed Fraction Dose 210 cGy   Prescribed Total Dose 5,880 cGy   Pancreas Reference Point from Course C1_Pancreas   Elapsed Course Days  @ [id number]   Session Dose 210 cGy   Total Dose 3,570 cGy   Pancreas CP Reference Point from Course C1_Pancreas   Elapsed Course Days  @ [id number]   Session Dose 217 cGy   Total Dose 3,691 cGy             SUBJECTIVE:  Well appearing      VITAL SIGNS:  KPS: 100, Fully active, able to carry on all pre-disease performed without restriction (ECOG equivalent 0)  Encounter Vitals  Temperature: 36.4 °C (97.5 °F)  Blood Pressure : 105/58  Pulse: 71  Pulse Oximetry: 95 %  Weight: 75.5 kg (166 lb 7.2 oz)  Pain Score: No pain  Pain Assessment 11/3/2021 10/20/2021 10/13/2021 2021   Pain Assessment - - - Denies Pain   Pain Score 0 0 0 0   Some recent data might be hidden          PHYSICAL EXAM:    Physical Exam  Constitutional:       Appearance: Normal appearance.   Abdominal:      General: There is no distension.      Palpations: Abdomen is soft.   Skin:     Findings: No erythema.    Neurological:      Mental Status: He is alert.          Toxicity Assessment 11/3/2021 10/20/2021 10/13/2021   Toxicity Assessment Abdomen Abdomen Abdomen   Fatigue (lethargy, malaise, asthenia) Moderate (e.g., decrease in performance status by 1 ECOG level or 20% Karnofsky) or causing difficulty performing some activities Increased fatigue over baseline, but not altering normal activities Increased fatigue over baseline, but not altering normal activities   Radiation Dermatitis None None None   Anorexia None Loss of appetite Loss of appetite   Dehydration None None None   Diarrhea w/o Colostomy None None None   Nausea Able to eat Able to eat Able to eat   Vomiting None None None   Dyspepsia/heartburn None None None   Dysphagia, Esophagitis, odynophagoa (painful swallowing) None Mild dysphagia, but can eat regular diet None   Gastritis None None None   Abdominal Pain or cramping Mild pain not interfering with function None Mild pain not interfering with function       CURRENT MEDICATIONS:    Current Outpatient Medications:   •  ondansetron (ZOFRAN) 8 MG Tab, Take 1 Tablet by mouth 3 times a day with meals for 30 days., Disp: 90 Each, Rfl: 0  •  capecitabine (XELODA) 500 MG tablet, TAKE 3 TABLETS BY MOUTH TWICE DAILY MONDAY TO FRIDAY WITH RADIATION, Disp: , Rfl:   •  levoFLOXacin (LEVAQUIN) 500 MG tablet, Take 1 Tablet by mouth every day., Disp: 5 Tablet, Rfl: 0  •  Lactobacillus (FLORANEX) Tab, Take 1 Tablet by mouth., Disp: 14 Tablet, Rfl: 0  •  dexamethasone (DECADRON) 6 MG Tab, 1 tab PO daily x 4 days, then 1/2 tab PO daily x 2 days then stop, Disp: 5 Tablet, Rfl: 0  •  ondansetron (ZOFRAN) 4 MG Tab tablet, Take 8 mg by mouth 2 (two) times a day., Disp: , Rfl:   •  pantoprazole (PROTONIX) 40 MG Tablet Delayed Response, pantoprazole 40 mg tablet,delayed release  Take 1 tablet every day by oral route., Disp: , Rfl:   •  finasteride (PROSCAR) 5 MG Tab, finasteride 5 mg tablet  Take 1 tablet every day by oral  route., Disp: , Rfl:   •  omeprazole (PRILOSEC) 40 MG delayed-release capsule, Take 40 mg by mouth every day., Disp: , Rfl:   •  ALLOPURINOL PO, Take 500 mg by mouth every day., Disp: , Rfl:   •  vitamin D (VITAMIND D3) 1000 UNIT Tab, Take 1,000 Units by mouth every day., Disp: , Rfl:   •  cyanocobalamin (VITAMIN B12) 1000 MCG Tab, Take  by mouth every day., Disp: , Rfl:   •  metFORMIN (GLUCOPHAGE) 500 MG Tab, Take 500 mg by mouth 2 times a day., Disp: , Rfl:   •  oxyCODONE-acetaminophen (PERCOCET) 5-325 MG Tab, Take 2 Tablets by mouth every four hours as needed. (Patient not taking: Reported on 7/29/2021), Disp: , Rfl:   •  tamsulosin (FLOMAX) 0.4 MG capsule, Take 0.8 mg by mouth every day., Disp: , Rfl:   •  atorvastatin (LIPITOR) 10 MG Tab, Take 10 mg by mouth every evening., Disp: , Rfl:   •  gabapentin (NEURONTIN) 300 MG Cap, Take 300 mg by mouth 3 times a day., Disp: , Rfl:   •  valsartan (DIOVAN) 160 MG Tab, Take 160 mg by mouth every day., Disp: , Rfl:   •  FINASTERIDE PO, Take  by mouth., Disp: , Rfl:     LABORATORY DATA:   Lab Results   Component Value Date/Time    SODIUM 139 08/29/2021 06:00 AM    POTASSIUM 3.8 08/29/2021 06:00 AM    CHLORIDE 105 08/29/2021 06:00 AM    CO2 26 08/29/2021 06:00 AM    GLUCOSE 80 08/29/2021 06:00 AM    BUN 10 08/29/2021 06:00 AM    CREATININE 0.6 (L) 08/29/2021 06:00 AM         Lab Results   Component Value Date/Time    WBC 9.4 08/26/2021 08:35 AM    HEMOGLOBIN 11.3 (L) 08/26/2021 08:35 AM    HEMATOCRIT 33.1 (L) 08/26/2021 08:35 AM    .2 (H) 08/26/2021 08:35 AM    PLATELETCT 162 08/26/2021 08:35 AM        RADIOLOGY DATA:  No results found.    IMPRESSION:  Malignant neoplasm of head of pancreas (HCC)  Staging form: Exocrine Pancreas, AJCC 8th Edition  - Clinical stage from 7/29/2021: Stage III (cT4, cN0, cM0) - Signed by Art Jimenez M.D. on 7/29/2021  Total positive nodes: 0      PLAN:  No change in treatment plan    Disposition:  Treatment plan reviewed. Questions  answered. Continue therapy outlined. Receiving [facility] at [facility] and creon rx.     Art Jimenez M.D.    Orders Placed This Encounter   • Rad Onc Aria Session Summary

## 2021-11-04 ENCOUNTER — APPOINTMENT (OUTPATIENT)
Dept: RADIATION ONCOLOGY | Facility: MEDICAL CENTER | Age: 76
End: 2021-11-04
Payer: COMMERCIAL

## 2021-11-04 LAB
CHEMOTHERAPY INFUSION START DATE: NORMAL
CHEMOTHERAPY RECORDS: 2.1
CHEMOTHERAPY RECORDS: 5880
CHEMOTHERAPY RECORDS: NORMAL
CHEMOTHERAPY RX CANCER: NORMAL
DATE 1ST CHEMO CANCER: NORMAL
RAD ONC ARIA COURSE LAST TREATMENT DATE: NORMAL
RAD ONC ARIA COURSE TREATMENT ELAPSED DAYS: NORMAL
RAD ONC ARIA REFERENCE POINT DOSAGE GIVEN TO DATE: 37.8
RAD ONC ARIA REFERENCE POINT DOSAGE GIVEN TO DATE: 39.09
RAD ONC ARIA REFERENCE POINT ID: NORMAL
RAD ONC ARIA REFERENCE POINT ID: NORMAL
RAD ONC ARIA REFERENCE POINT SESSION DOSAGE GIVEN: 2.1
RAD ONC ARIA REFERENCE POINT SESSION DOSAGE GIVEN: 2.17

## 2021-11-04 PROCEDURE — 77014 PR CT GUIDANCE PLACEMENT RAD THERAPY FIELDS: CPT | Mod: 26 | Performed by: RADIOLOGY

## 2021-11-04 PROCEDURE — 77386 HCHG IMRT DELIVERY COMPLEX: CPT | Performed by: RADIOLOGY

## 2021-11-05 NOTE — TELEPHONE ENCOUNTER
Nutrition Services: Telephone Encounter     [facility]illo prefers prescription to be written by Dr. Greer as opposed to [facility] as is more likely to be received efficiently in pharmacy given is coming from speciality provider.     RD contacted Dr. Greer via voalte and provided [facility]. Per pt's weight, RD recommends 36,000 unit capsule, two per meal for a total of 6 capsules per day. Can titrate and adjust per pt symptoms. Notified Dr. Greer read message.     RD received voicemail from pt's daughter Goldie, requesting updates in regard to creon. RD called and reached voicemail. RD left message providing update.     RD to follow and assist with creon prescription as needed  [phone]

## 2021-11-06 NOTE — PROGRESS NOTES
Nutrition Services: Brief Update  Wt Readings from Last 7 Encounters:   11/03/21 75.5 kg (166 lb 7.2 oz)   10/20/21 74.1 kg (163 lb 5.8 oz)   10/13/21 75.1 kg (165 lb 9.1 oz)   08/29/21 81.9 kg (180 lb 9.6 oz)   07/29/21 86.4 kg (190 lb 7.6 oz)   04/08/21 89 kg (196 lb 3.4 oz)   03/30/21 91.2 kg (201 lb)     Weight Change: wt appears to remain stable at this time despite some fluctuations.    RD able to visit pt following OTV. RD has experienced difficulty relaying cre[facility] concerns to [facility]. Was able to get a hold of [facility] yesterday and recommended 36,000 units @ 2 capsules per meal. Was notified that [facility] has 3 business days to view and respond to message. Pt presents with daughter. Pt states Dr. Greer mentions will start the process, though they are unsure what this means. Pt mentions has continued with signs of pancreatic insufficiency that discussed with RD last week. Daughter is anticipating cost of creon.     Plan/Recommend:  • RD discussed will relay creon recommendations to Dr. Greer to ensure all parties are aware of dosing.   • Provided coupon for 56 free capsules in order to titrate or adjust as needed.   • Discussed cost of creon briefly with CARL Juarez. Larry states reduced pay is limited to private insurances, though creon is not likely to cost a significant amount with government insurance. RD to follow-up and assist as needed.     Pt verbalizes understanding and is receptive to information provided. RD to continue to monitor throughout treatment and provide nutrition recommendations as indicated.  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

## 2021-11-10 NOTE — ON TREATMENT VISIT
ON TREATMENT  NOTE  RADIATION ONCOLOGY DEPARTMENT    Patient name:  Zeus Rodríguez    Primary Physician:  Ric Delgadillo M.D. MRN: [id number]  CSN: [id number]   Referring physician:  Krunal Greer M.D. : 1945, 76 y.o.     ENCOUNTER DATE:  11/10/21    DIAGNOSIS:  Malignant neoplasm of head of pancreas (HCC)  Staging form: Exocrine Pancreas, AJCC 8th Edition  - Clinical stage from 2021: Stage III (cT4, cN0, cM0) - Signed by Art Jimenez M.D. on 2021  Total positive nodes: 0      TREATMENT SUMMARY:  Aria Treatment Information        Some values may be hidden. Unless noted otherwise, only the newest values recorded on each date are displayed.         Aria Treatment Summary 11/10/21   Course First Treatment Date 10/11/2021   Course Last Treatment Date 11/10/2021   Pancreas Plan from Course C1_Pancreas   Fraction  28   Elapsed Course Days  @ [id number]   Prescribed Fraction Dose 210 cGy   Prescribed Total Dose 5,880 cGy   Pancreas Reference Point from Course C1_Pancreas   Elapsed Course Days  @ [id number]   Session Dose 210 cGy   Total Dose 4,620 cGy   Pancreas CP Reference Point from Course C1_Pancreas   Elapsed Course Days  @ [id number]   Session Dose 217 cGy   Total Dose 4,777 cGy             SUBJECTIVE:  Increased wt, hydration 2x/ wk      VITAL SIGNS:  KPS: 90, Able to carry on normal activity; minor signs or symptoms of disease (ECOG equivalent 0)  Encounter Vitals  Temperature: 36.3 °C (97.4 °F)  Blood Pressure : 116/67  Pulse: 61  Pulse Oximetry: 99 %  Weight: 75.9 kg (167 lb 5.3 oz)  Pain Score: No pain  Pain Assessment 11/10/2021 11/3/2021 10/20/2021 10/13/2021 2021   Pain Assessment - - - - Denies Pain   Pain Score 0 0 0 0 0   Some recent data might be hidden          PHYSICAL EXAM:    Physical Exam  Constitutional:       Appearance: Normal appearance.   Abdominal:      General: There is no distension.      Palpations: Abdomen is soft.   Skin:      Findings: No erythema.   Neurological:      Mental Status: He is alert.          Toxicity Assessment 11/10/2021 11/3/2021 10/20/2021 10/13/2021   Toxicity Assessment Abdomen Abdomen Abdomen Abdomen   Fatigue (lethargy, malaise, asthenia) Moderate (e.g., decrease in performance status by 1 ECOG level or 20% Karnofsky) or causing difficulty performing some activities Moderate (e.g., decrease in performance status by 1 ECOG level or 20% Karnofsky) or causing difficulty performing some activities Increased fatigue over baseline, but not altering normal activities Increased fatigue over baseline, but not altering normal activities   Radiation Dermatitis None None None None   Anorexia None None Loss of appetite Loss of appetite   Dehydration None None None None   Diarrhea w/o Colostomy Increase of <4 stools/day over pre-treatment None None None   Nausea None Able to eat Able to eat Able to eat   Vomiting None None None None   Dyspepsia/heartburn None None None None   Dysphagia, Esophagitis, odynophagoa (painful swallowing) None None Mild dysphagia, but can eat regular diet None   Gastritis None None None None   Abdominal Pain or cramping Mild pain not interfering with function Mild pain not interfering with function None Mild pain not interfering with function       CURRENT MEDICATIONS:    Current Outpatient Medications:   •  ondansetron (ZOFRAN) 8 MG Tab, Take 1 Tablet by mouth 3 times a day with meals for 30 days., Disp: 90 Each, Rfl: 0  •  capecitabine (XELODA) 500 MG tablet, TAKE 3 TABLETS BY MOUTH TWICE DAILY MONDAY TO FRIDAY WITH RADIATION, Disp: , Rfl:   •  ondansetron (ZOFRAN) 4 MG Tab tablet, Take 8 mg by mouth 2 (two) times a day., Disp: , Rfl:   •  finasteride (PROSCAR) 5 MG Tab, finasteride 5 mg tablet  Take 1 tablet every day by oral route., Disp: , Rfl:   •  omeprazole (PRILOSEC) 40 MG delayed-release capsule, Take 40 mg by mouth every day., Disp: , Rfl:   •  ALLOPURINOL PO, Take 500 mg by mouth every day.,  Disp: , Rfl:   •  vitamin D (VITAMIND D3) 1000 UNIT Tab, Take 1,000 Units by mouth every day., Disp: , Rfl:   •  cyanocobalamin (VITAMIN B12) 1000 MCG Tab, Take  by mouth every day., Disp: , Rfl:   •  metFORMIN (GLUCOPHAGE) 500 MG Tab, Take 500 mg by mouth 2 times a day., Disp: , Rfl:   •  oxyCODONE-acetaminophen (PERCOCET) 5-325 MG Tab, Take 2 Tablets by mouth every four hours as needed. (Patient not taking: Reported on 7/29/2021), Disp: , Rfl:   •  tamsulosin (FLOMAX) 0.4 MG capsule, Take 0.8 mg by mouth every day., Disp: , Rfl:   •  atorvastatin (LIPITOR) 10 MG Tab, Take 10 mg by mouth every evening., Disp: , Rfl:   •  gabapentin (NEURONTIN) 300 MG Cap, Take 300 mg by mouth 3 times a day., Disp: , Rfl:   •  valsartan (DIOVAN) 160 MG Tab, Take 160 mg by mouth every day., Disp: , Rfl:   •  FINASTERIDE PO, Take  by mouth., Disp: , Rfl:     LABORATORY DATA:   Lab Results   Component Value Date/Time    SODIUM 139 08/29/2021 06:00 AM    POTASSIUM 3.8 08/29/2021 06:00 AM    CHLORIDE 105 08/29/2021 06:00 AM    CO2 26 08/29/2021 06:00 AM    GLUCOSE 80 08/29/2021 06:00 AM    BUN 10 08/29/2021 06:00 AM    CREATININE 0.6 (L) 08/29/2021 06:00 AM         Lab Results   Component Value Date/Time    WBC 9.4 08/26/2021 08:35 AM    HEMOGLOBIN 11.3 (L) 08/26/2021 08:35 AM    HEMATOCRIT 33.1 (L) 08/26/2021 08:35 AM    .2 (H) 08/26/2021 08:35 AM    PLATELETCT 162 08/26/2021 08:35 AM        RADIOLOGY DATA:  No results found.    IMPRESSION:  Malignant neoplasm of head of pancreas (HCC)  Staging form: Exocrine Pancreas, AJCC 8th Edition  - Clinical stage from 7/29/2021: Stage III (cT4, cN0, cM0) - Signed by Art Jimenez M.D. on 7/29/2021  Total positive nodes: 0      PLAN:  No change in treatment plan    Disposition:  Treatment plan reviewed. Questions answered. Continue therapy outlined.     Art Jimenez M.D.    Orders Placed This Encounter   • Rad Onc Aria Session Summary

## 2021-11-17 NOTE — TELEPHONE ENCOUNTER
Nutrition Services: Telephone Encounter  Wt Readings from Last 7 Encounters:   11/17/21 75.8 kg (167 lb)   11/10/21 75.9 kg (167 lb 5.3 oz)   11/03/21 75.5 kg (166 lb 7.2 oz)   10/20/21 74.1 kg (163 lb 5.8 oz)   10/13/21 75.1 kg (165 lb 9.1 oz)   08/29/21 81.9 kg (180 lb 9.6 oz)   07/29/21 86.4 kg (190 lb 7.6 oz)     Weight Change: wt stable since 10/13, with slight uptrend     RD unable to visit pt today following OTV, though pt is finishing treatment tomorrow 11/18, and RD wanted t ensure pt's needs have been addressed in regard to nutrition as well as creon.     RD called and able to speak with pt's daughter Goldie. States pt has been doing very well. States has been utilizing the creon and states it has helped tremendously. Mentions pt has a strong appetite and feels much better. Mentions Dr. Jimenez was pleased with pt's current state compared to initial visit. Denies additional concerns, voices appreciation for pancreatic enzymes.     Plan/Recommend:  • RD supported continuation of strong efforts, encouraged Goldie to call RD if any questions or concerns arise.     Pt verbalizes understanding and is  receptive to information provided. RD remains available for consult as needed  [phone]

## 2021-11-17 NOTE — ON TREATMENT VISIT
ON TREATMENT  NOTE  RADIATION ONCOLOGY DEPARTMENT    Patient name:  Zeus Rodríguez    Primary Physician:  Ric Delgadillo M.D. MRN: [id number]  CSN: [id number]   Referring physician:  Krunal Greer M.D. : 1945, 76 y.o.     ENCOUNTER DATE:  21    DIAGNOSIS:  Visit Diagnoses     ICD-10-CM   1. Malignant neoplasm of head of pancreas (HCC)  C25.0     Malignant neoplasm of head of pancreas (HCC)  Staging form: Exocrine Pancreas, AJCC 8th Edition  - Clinical stage from 2021: Stage III (cT4, cN0, cM0) - Signed by Art Jimenez M.D. on 2021  Total positive nodes: 0      TREATMENT SUMMARY:  Aria Treatment Information        Some values may be hidden. Unless noted otherwise, only the newest values recorded on each date are displayed.         Aria Treatment Summary 21   Course First Treatment Date 10/11/2021   Course Last Treatment Date 2021   Pancreas Plan from Course C1_Pancreas   Fraction  of 28   Elapsed Course Days 37 @ [id number]   Prescribed Fraction Dose 210 cGy   Prescribed Total Dose 5,880 cGy   Pancreas Reference Point from Course C1_Pancreas   Elapsed Course Days 37 @ [id number]   Session Dose 210 cGy   Total Dose 5,670 cGy   Pancreas CP Reference Point from Course C1_Pancreas   Elapsed Course Days  @ [id number]   Session Dose 217 cGy   Total Dose 5,863 cGy             SUBJECTIVE:  Mild fatigue, mild diarrhea      VITAL SIGNS:  KPS: 100, Fully active, able to carry on all pre-disease performed without restriction (ECOG equivalent 0)  Encounter Vitals  Temperature: 36.6 °C (97.8 °F)  Temp src: Temporal  Blood Pressure : 133/69  Pulse: (!) 55  Pulse Oximetry: 99 %  Weight: 75.8 kg (167 lb)  Pain Score: No pain  Pain Assessment 2021 11/10/2021 11/3/2021 10/20/2021 10/13/2021 2021   Pain Assessment - - - - - Denies Pain   Pain Score 0 0 0 0 0 0   Some recent data might be hidden          PHYSICAL EXAM:    Physical Exam  Constitutional:        Appearance: Normal appearance.   Abdominal:      General: There is no distension.      Palpations: Abdomen is soft.   Skin:     Findings: No erythema.   Neurological:      Mental Status: He is alert.          Toxicity Assessment 11/17/2021 11/10/2021 11/3/2021 10/20/2021 10/13/2021   Toxicity Assessment Abdomen Abdomen Abdomen Abdomen Abdomen   Fatigue (lethargy, malaise, asthenia) Moderate (e.g., decrease in performance status by 1 ECOG level or 20% Karnofsky) or causing difficulty performing some activities Moderate (e.g., decrease in performance status by 1 ECOG level or 20% Karnofsky) or causing difficulty performing some activities Moderate (e.g., decrease in performance status by 1 ECOG level or 20% Karnofsky) or causing difficulty performing some activities Increased fatigue over baseline, but not altering normal activities Increased fatigue over baseline, but not altering normal activities   Radiation Dermatitis None None None None None   Anorexia None None None Loss of appetite Loss of appetite   Dehydration None None None None None   Diarrhea w/o Colostomy Increase of <4 stools/day over pre-treatment Increase of <4 stools/day over pre-treatment None None None   Nausea None None Able to eat Able to eat Able to eat   Vomiting None None None None None   Dyspepsia/heartburn Mild None None None None   Dysphagia, Esophagitis, odynophagoa (painful swallowing) None None None Mild dysphagia, but can eat regular diet None   Gastritis None None None None None   Abdominal Pain or cramping None Mild pain not interfering with function Mild pain not interfering with function None Mild pain not interfering with function       CURRENT MEDICATIONS:    Current Outpatient Medications:   •  ondansetron (ZOFRAN) 8 MG Tab, Take 1 Tablet by mouth 3 times a day with meals for 30 days., Disp: 90 Each, Rfl: 0  •  capecitabine (XELODA) 500 MG tablet, TAKE 3 TABLETS BY MOUTH TWICE DAILY MONDAY TO FRIDAY WITH RADIATION, Disp: , Rfl:    •  ondansetron (ZOFRAN) 4 MG Tab tablet, Take 8 mg by mouth 2 (two) times a day., Disp: , Rfl:   •  finasteride (PROSCAR) 5 MG Tab, finasteride 5 mg tablet  Take 1 tablet every day by oral route., Disp: , Rfl:   •  omeprazole (PRILOSEC) 40 MG delayed-release capsule, Take 40 mg by mouth every day., Disp: , Rfl:   •  ALLOPURINOL PO, Take 500 mg by mouth every day., Disp: , Rfl:   •  vitamin D (VITAMIND D3) 1000 UNIT Tab, Take 1,000 Units by mouth every day., Disp: , Rfl:   •  cyanocobalamin (VITAMIN B12) 1000 MCG Tab, Take  by mouth every day., Disp: , Rfl:   •  metFORMIN (GLUCOPHAGE) 500 MG Tab, Take 500 mg by mouth 2 times a day., Disp: , Rfl:   •  oxyCODONE-acetaminophen (PERCOCET) 5-325 MG Tab, Take 2 Tablets by mouth every four hours as needed. (Patient not taking: Reported on 7/29/2021), Disp: , Rfl:   •  tamsulosin (FLOMAX) 0.4 MG capsule, Take 0.8 mg by mouth every day., Disp: , Rfl:   •  atorvastatin (LIPITOR) 10 MG Tab, Take 10 mg by mouth every evening., Disp: , Rfl:   •  gabapentin (NEURONTIN) 300 MG Cap, Take 300 mg by mouth 3 times a day., Disp: , Rfl:   •  valsartan (DIOVAN) 160 MG Tab, Take 160 mg by mouth every day., Disp: , Rfl:   •  FINASTERIDE PO, Take  by mouth., Disp: , Rfl:     LABORATORY DATA:   Lab Results   Component Value Date/Time    SODIUM 139 08/29/2021 06:00 AM    POTASSIUM 3.8 08/29/2021 06:00 AM    CHLORIDE 105 08/29/2021 06:00 AM    CO2 26 08/29/2021 06:00 AM    GLUCOSE 80 08/29/2021 06:00 AM    BUN 10 08/29/2021 06:00 AM    CREATININE 0.6 (L) 08/29/2021 06:00 AM         Lab Results   Component Value Date/Time    WBC 9.4 08/26/2021 08:35 AM    HEMOGLOBIN 11.3 (L) 08/26/2021 08:35 AM    HEMATOCRIT 33.1 (L) 08/26/2021 08:35 AM    .2 (H) 08/26/2021 08:35 AM    PLATELETCT 162 08/26/2021 08:35 AM        RADIOLOGY DATA:  No results found.    IMPRESSION:  Malignant neoplasm of head of pancreas (HCC)  Staging form: Exocrine Pancreas, AJCC 8th Edition  - Clinical stage from  7/29/2021: Stage III (cT4, cN0, cM0) - Signed by Art Jimenez M.D. on 7/29/2021  Total positive nodes: 0      PLAN:  No change in treatment plan    Disposition:  Treatment plan reviewed. Questions answered. Continue therapy outlined. Ordered repeat CT CAP (pancreas protocol), labs for 6 wks with follow up afterward    Art Jimenez M.D.    Orders Placed This Encounter   • Rad Onc Aria Session Summary   • CT-CHEST (THORAX) WITH   • CT-ABDOMEN-PELVIS WITH   • CA 19-9   • CBC WITH DIFFERENTIAL   • Comp Metabolic Panel

## 2021-12-29 PROBLEM — R07.9 CHEST PAIN: Status: ACTIVE | Noted: 2021-01-01

## 2021-12-30 PROBLEM — R07.9 CHEST PAIN: Status: RESOLVED | Noted: 2021-01-01 | Resolved: 2021-01-01

## 2021-12-30 NOTE — TELEPHONE ENCOUNTER
-Call received yesterday by ED provider requesting advice regarding pericardial effusion on CT and chest discomfort sounding like pericarditis.    -Recommended ESR and C-reactive protein.    -If patient clinically is behaving like pericarditis then colchicine 0.6 mg twice daily for 3 months, reduce to once daily if patient has significant diarrhea.    -If no significant bleeding and kidney function is okay, ibuprofen 600 mg 3 times daily for 2 weeks.    -Recommend echocardiogram in the near future and repeat echocardiogram in 3 months.    -If patient needs to be seen in cardiology clinic, call [phone] to schedule follow-up in cardiology clinic at the [facility] [place].    -For further inpatient instructions, please call [facility] at [phone].

## 2022-01-01 ENCOUNTER — HOSPITAL ENCOUNTER (OUTPATIENT)
Facility: MEDICAL CENTER | Age: 77
End: 2022-07-07
Attending: INTERNAL MEDICINE
Payer: COMMERCIAL

## 2022-01-01 ENCOUNTER — HOSPITAL ENCOUNTER (OUTPATIENT)
Dept: RADIOLOGY | Facility: MEDICAL CENTER | Age: 77
End: 2022-09-08
Attending: RADIOLOGY
Payer: COMMERCIAL

## 2022-01-01 ENCOUNTER — HOSPITAL ENCOUNTER (OUTPATIENT)
Dept: RADIATION ONCOLOGY | Facility: MEDICAL CENTER | Age: 77
End: 2022-01-31
Attending: RADIOLOGY
Payer: COMMERCIAL

## 2022-01-01 ENCOUNTER — HOSPITAL ENCOUNTER (OUTPATIENT)
Dept: RADIOLOGY | Facility: MEDICAL CENTER | Age: 77
End: 2022-03-31
Attending: RADIOLOGY
Payer: COMMERCIAL

## 2022-01-01 ENCOUNTER — HOSPITAL ENCOUNTER (OUTPATIENT)
Dept: RADIATION ONCOLOGY | Facility: MEDICAL CENTER | Age: 77
End: 2022-09-30
Attending: RADIOLOGY
Payer: COMMERCIAL

## 2022-01-01 ENCOUNTER — TELEPHONE (OUTPATIENT)
Dept: RADIATION ONCOLOGY | Facility: MEDICAL CENTER | Age: 77
End: 2022-01-01

## 2022-01-01 ENCOUNTER — HOSPITAL ENCOUNTER (OUTPATIENT)
Facility: MEDICAL CENTER | Age: 77
End: 2022-02-09
Attending: INTERNAL MEDICINE
Payer: COMMERCIAL

## 2022-01-01 ENCOUNTER — HOSPITAL ENCOUNTER (OUTPATIENT)
Dept: RADIOLOGY | Facility: MEDICAL CENTER | Age: 77
End: 2022-05-12
Attending: INTERNAL MEDICINE
Payer: COMMERCIAL

## 2022-01-01 ENCOUNTER — APPOINTMENT (OUTPATIENT)
Dept: RADIOLOGY | Facility: MEDICAL CENTER | Age: 77
End: 2022-01-01
Attending: RADIOLOGY
Payer: COMMERCIAL

## 2022-01-01 ENCOUNTER — HOSPITAL ENCOUNTER (OUTPATIENT)
Dept: RADIATION ONCOLOGY | Facility: MEDICAL CENTER | Age: 77
End: 2022-04-30
Attending: RADIOLOGY
Payer: COMMERCIAL

## 2022-01-01 ENCOUNTER — APPOINTMENT (OUTPATIENT)
Dept: ADMISSIONS | Facility: MEDICAL CENTER | Age: 77
End: 2022-01-01
Payer: COMMERCIAL

## 2022-01-01 ENCOUNTER — HOSPITAL ENCOUNTER (OUTPATIENT)
Dept: RADIATION ONCOLOGY | Facility: MEDICAL CENTER | Age: 77
End: 2022-06-30
Attending: RADIOLOGY
Payer: COMMERCIAL

## 2022-01-01 ENCOUNTER — HOSPITAL ENCOUNTER (OUTPATIENT)
Facility: MEDICAL CENTER | Age: 77
End: 2022-10-19
Attending: RADIOLOGY | Admitting: RADIOLOGY
Payer: COMMERCIAL

## 2022-01-01 ENCOUNTER — PRE-ADMISSION TESTING (OUTPATIENT)
Dept: ADMISSIONS | Facility: MEDICAL CENTER | Age: 77
End: 2022-01-01
Attending: RADIOLOGY
Payer: COMMERCIAL

## 2022-01-01 ENCOUNTER — HOSPITAL ENCOUNTER (OUTPATIENT)
Facility: MEDICAL CENTER | Age: 77
End: 2022-04-12
Attending: INTERNAL MEDICINE
Payer: COMMERCIAL

## 2022-01-01 VITALS
DIASTOLIC BLOOD PRESSURE: 82 MMHG | SYSTOLIC BLOOD PRESSURE: 126 MMHG | BODY MASS INDEX: 22.22 KG/M2 | OXYGEN SATURATION: 95 % | TEMPERATURE: 97.5 F | WEIGHT: 163.8 LBS | HEART RATE: 77 BPM

## 2022-01-01 VITALS
BODY MASS INDEX: 22.96 KG/M2 | DIASTOLIC BLOOD PRESSURE: 65 MMHG | HEART RATE: 87 BPM | TEMPERATURE: 98.7 F | SYSTOLIC BLOOD PRESSURE: 114 MMHG | OXYGEN SATURATION: 96 % | WEIGHT: 169.31 LBS

## 2022-01-01 VITALS
WEIGHT: 151.68 LBS | DIASTOLIC BLOOD PRESSURE: 90 MMHG | RESPIRATION RATE: 16 BRPM | BODY MASS INDEX: 21.23 KG/M2 | HEART RATE: 84 BPM | HEIGHT: 71 IN | TEMPERATURE: 96.6 F | OXYGEN SATURATION: 95 % | SYSTOLIC BLOOD PRESSURE: 143 MMHG

## 2022-01-01 VITALS
DIASTOLIC BLOOD PRESSURE: 76 MMHG | HEART RATE: 74 BPM | SYSTOLIC BLOOD PRESSURE: 113 MMHG | BODY MASS INDEX: 21 KG/M2 | HEIGHT: 71 IN | WEIGHT: 150 LBS

## 2022-01-01 DIAGNOSIS — C25.1 MALIGNANT NEOPLASM OF BODY OF PANCREAS (HCC): ICD-10-CM

## 2022-01-01 DIAGNOSIS — C25.0 MALIGNANT NEOPLASM OF HEAD OF PANCREAS (HCC): ICD-10-CM

## 2022-01-01 DIAGNOSIS — I31.9: ICD-10-CM

## 2022-01-01 DIAGNOSIS — C25.0 MALIGNANT NEOPLASM OF HEAD OF PANCREAS (HCC): Primary | ICD-10-CM

## 2022-01-01 LAB
ALBUMIN SERPL BCP-MCNC: 3.4 G/DL (ref 3.2–4.9)
ALBUMIN SERPL BCP-MCNC: 4 G/DL (ref 3.2–4.9)
ALBUMIN SERPL BCP-MCNC: 4.4 G/DL (ref 3.2–4.9)
ALBUMIN/GLOB SERPL: 1.7 G/DL
ALBUMIN/GLOB SERPL: 1.9 G/DL
ALBUMIN/GLOB SERPL: 2.8 G/DL
ALP SERPL-CCNC: 101 U/L (ref 30–99)
ALP SERPL-CCNC: 61 U/L (ref 30–99)
ALP SERPL-CCNC: 64 U/L (ref 30–99)
ALT SERPL-CCNC: 20 U/L (ref 2–50)
ALT SERPL-CCNC: 21 U/L (ref 2–50)
ALT SERPL-CCNC: 41 U/L (ref 2–50)
ANION GAP SERPL CALC-SCNC: 10 MMOL/L (ref 7–16)
ANION GAP SERPL CALC-SCNC: 13 MMOL/L (ref 7–16)
ANION GAP SERPL CALC-SCNC: 16 MMOL/L (ref 7–16)
AST SERPL-CCNC: 38 U/L (ref 12–45)
AST SERPL-CCNC: 40 U/L (ref 12–45)
AST SERPL-CCNC: 55 U/L (ref 12–45)
BASOPHILS # BLD AUTO: 0.2 % (ref 0–1.8)
BASOPHILS # BLD: 0.01 K/UL (ref 0–0.12)
BILIRUB SERPL-MCNC: 0.3 MG/DL (ref 0.1–1.5)
BILIRUB SERPL-MCNC: 0.4 MG/DL (ref 0.1–1.5)
BILIRUB SERPL-MCNC: 0.4 MG/DL (ref 0.1–1.5)
BUN SERPL-MCNC: 10 MG/DL (ref 8–22)
BUN SERPL-MCNC: 11 MG/DL (ref 8–22)
BUN SERPL-MCNC: 14 MG/DL (ref 8–22)
CALCIUM SERPL-MCNC: 7.7 MG/DL (ref 8.5–10.5)
CALCIUM SERPL-MCNC: 8.8 MG/DL (ref 8.5–10.5)
CALCIUM SERPL-MCNC: 8.9 MG/DL (ref 8.5–10.5)
CANCER AG19-9 SERPL-ACNC: 372 U/ML (ref 0–35)
CANCER AG19-9 SERPL-ACNC: 779 U/ML (ref 0–35)
CANCER AG19-9 SERPL-ACNC: 924 U/ML (ref 0–35)
CHLORIDE SERPL-SCNC: 100 MMOL/L (ref 96–112)
CHLORIDE SERPL-SCNC: 103 MMOL/L (ref 96–112)
CHLORIDE SERPL-SCNC: 97 MMOL/L (ref 96–112)
CO2 SERPL-SCNC: 20 MMOL/L (ref 20–33)
CO2 SERPL-SCNC: 23 MMOL/L (ref 20–33)
CO2 SERPL-SCNC: 25 MMOL/L (ref 20–33)
CREAT SERPL-MCNC: 0.48 MG/DL (ref 0.5–1.4)
CREAT SERPL-MCNC: 0.51 MG/DL (ref 0.5–1.4)
CREAT SERPL-MCNC: 0.59 MG/DL (ref 0.5–1.4)
EOSINOPHIL # BLD AUTO: 0 K/UL (ref 0–0.51)
EOSINOPHIL NFR BLD: 0 % (ref 0–6.9)
ERYTHROCYTE [DISTWIDTH] IN BLOOD BY AUTOMATED COUNT: 62.4 FL (ref 35.9–50)
ERYTHROCYTE [DISTWIDTH] IN BLOOD BY AUTOMATED COUNT: 63.7 FL (ref 35.9–50)
GFR SERPLBLD CREATININE-BSD FMLA CKD-EPI: 100 ML/MIN/1.73 M 2
GFR SERPLBLD CREATININE-BSD FMLA CKD-EPI: 107 ML/MIN/1.73 M 2
GLOBULIN SER CALC-MCNC: 1.6 G/DL (ref 1.9–3.5)
GLOBULIN SER CALC-MCNC: 2 G/DL (ref 1.9–3.5)
GLOBULIN SER CALC-MCNC: 2.1 G/DL (ref 1.9–3.5)
GLUCOSE BLD STRIP.AUTO-MCNC: 154 MG/DL (ref 65–99)
GLUCOSE SERPL-MCNC: 131 MG/DL (ref 65–99)
GLUCOSE SERPL-MCNC: 159 MG/DL (ref 65–99)
GLUCOSE SERPL-MCNC: 443 MG/DL (ref 65–99)
HCT VFR BLD AUTO: 31.8 % (ref 42–52)
HCT VFR BLD AUTO: 35.9 % (ref 42–52)
HGB BLD-MCNC: 11.2 G/DL (ref 14–18)
HGB BLD-MCNC: 11.7 G/DL (ref 14–18)
IMM GRANULOCYTES # BLD AUTO: 0.06 K/UL (ref 0–0.11)
IMM GRANULOCYTES NFR BLD AUTO: 1.1 % (ref 0–0.9)
INR PPP: 1.35 (ref 0.87–1.13)
LYMPHOCYTES # BLD AUTO: 0.21 K/UL (ref 1–4.8)
LYMPHOCYTES NFR BLD: 3.8 % (ref 22–41)
MCH RBC QN AUTO: 30.4 PG (ref 27–33)
MCH RBC QN AUTO: 34.9 PG (ref 27–33)
MCHC RBC AUTO-ENTMCNC: 31.2 G/DL (ref 33.7–35.3)
MCHC RBC AUTO-ENTMCNC: 36.8 G/DL (ref 33.7–35.3)
MCV RBC AUTO: 94.9 FL (ref 81.4–97.8)
MCV RBC AUTO: 97.6 FL (ref 81.4–97.8)
MONOCYTES # BLD AUTO: 0.27 K/UL (ref 0–0.85)
MONOCYTES NFR BLD AUTO: 4.9 % (ref 0–13.4)
NEUTROPHILS # BLD AUTO: 4.99 K/UL (ref 1.82–7.42)
NEUTROPHILS NFR BLD: 90 % (ref 44–72)
NRBC # BLD AUTO: 0 K/UL
NRBC BLD-RTO: 0 /100 WBC
PLATELET # BLD AUTO: 112 K/UL (ref 164–446)
PLATELET # BLD AUTO: 184 K/UL (ref 164–446)
PMV BLD AUTO: 11.6 FL (ref 9–12.9)
PMV BLD AUTO: 12.3 FL (ref 9–12.9)
POTASSIUM SERPL-SCNC: 3.6 MMOL/L (ref 3.6–5.5)
POTASSIUM SERPL-SCNC: 4.3 MMOL/L (ref 3.6–5.5)
POTASSIUM SERPL-SCNC: 4.4 MMOL/L (ref 3.6–5.5)
PROT SERPL-MCNC: 5.4 G/DL (ref 6–8.2)
PROT SERPL-MCNC: 6 G/DL (ref 6–8.2)
PROT SERPL-MCNC: 6.1 G/DL (ref 6–8.2)
PROTHROMBIN TIME: 16.5 SEC (ref 12–14.6)
RBC # BLD AUTO: 3.35 M/UL (ref 4.7–6.1)
RBC # BLD AUTO: 3.68 M/UL (ref 4.7–6.1)
SODIUM SERPL-SCNC: 133 MMOL/L (ref 135–145)
SODIUM SERPL-SCNC: 136 MMOL/L (ref 135–145)
SODIUM SERPL-SCNC: 138 MMOL/L (ref 135–145)
WBC # BLD AUTO: 5.5 K/UL (ref 4.8–10.8)
WBC # BLD AUTO: 7.6 K/UL (ref 4.8–10.8)

## 2022-01-01 PROCEDURE — 700111 HCHG RX REV CODE 636 W/ 250 OVERRIDE (IP)

## 2022-01-01 PROCEDURE — 99212 OFFICE O/P EST SF 10 MIN: CPT | Performed by: RADIOLOGY

## 2022-01-01 PROCEDURE — 86301 IMMUNOASSAY TUMOR CA 19-9: CPT

## 2022-01-01 PROCEDURE — 700105 HCHG RX REV CODE 258: Performed by: RADIOLOGY

## 2022-01-01 PROCEDURE — 36415 COLL VENOUS BLD VENIPUNCTURE: CPT

## 2022-01-01 PROCEDURE — 74178 CT ABD&PLV WO CNTR FLWD CNTR: CPT

## 2022-01-01 PROCEDURE — 71260 CT THORAX DX C+: CPT

## 2022-01-01 PROCEDURE — 700111 HCHG RX REV CODE 636 W/ 250 OVERRIDE (IP): Performed by: RADIOLOGY

## 2022-01-01 PROCEDURE — 82962 GLUCOSE BLOOD TEST: CPT

## 2022-01-01 PROCEDURE — 99213 OFFICE O/P EST LOW 20 MIN: CPT | Mod: 95 | Performed by: RADIOLOGY

## 2022-01-01 PROCEDURE — 85027 COMPLETE CBC AUTOMATED: CPT

## 2022-01-01 PROCEDURE — 80053 COMPREHEN METABOLIC PANEL: CPT

## 2022-01-01 PROCEDURE — 99213 OFFICE O/P EST LOW 20 MIN: CPT | Performed by: RADIOLOGY

## 2022-01-01 PROCEDURE — 99214 OFFICE O/P EST MOD 30 MIN: CPT | Mod: 95 | Performed by: RADIOLOGY

## 2022-01-01 PROCEDURE — 700117 HCHG RX CONTRAST REV CODE 255: Performed by: RADIOLOGY

## 2022-01-01 PROCEDURE — 85025 COMPLETE CBC W/AUTO DIFF WBC: CPT

## 2022-01-01 PROCEDURE — 85610 PROTHROMBIN TIME: CPT

## 2022-01-01 PROCEDURE — 302083 SET,INFUSION NEEDLE 20 X 3/4": Performed by: RADIOLOGY

## 2022-01-01 PROCEDURE — A9552 F18 FDG: HCPCS

## 2022-01-01 RX ORDER — DIPHENOXYLATE HYDROCHLORIDE AND ATROPINE SULFATE 2.5; .025 MG/1; MG/1
1 TABLET ORAL 4 TIMES DAILY PRN
Qty: 30 TABLET | Refills: 0 | Status: SHIPPED | OUTPATIENT
Start: 2022-01-01 | End: 2022-01-01

## 2022-01-01 RX ORDER — ONDANSETRON 4 MG/1
4 TABLET, FILM COATED ORAL EVERY 4 HOURS PRN
Qty: 60 TABLET | Refills: 3 | Status: SHIPPED | OUTPATIENT
Start: 2022-01-01 | End: 2022-01-01

## 2022-01-01 RX ORDER — PREDNISONE 10 MG/1
10 TABLET ORAL DAILY
Qty: 90 TABLET | Refills: 0 | Status: SHIPPED | OUTPATIENT
Start: 2022-01-01 | End: 2022-01-01 | Stop reason: SDUPTHER

## 2022-01-01 RX ORDER — ONDANSETRON HYDROCHLORIDE 8 MG/1
8 TABLET, FILM COATED ORAL 3 TIMES DAILY
Qty: 90 EACH | Refills: 2 | Status: SHIPPED | OUTPATIENT
Start: 2022-01-01 | End: 2022-12-18

## 2022-01-01 RX ORDER — LOPERAMIDE HYDROCHLORIDE 2 MG/1
2 CAPSULE ORAL 4 TIMES DAILY PRN
COMMUNITY

## 2022-01-01 RX ORDER — PREDNISONE 20 MG/1
20 TABLET ORAL DAILY
Qty: 90 TABLET | Refills: 0 | Status: SHIPPED | OUTPATIENT
Start: 2022-01-01 | End: 2022-01-01

## 2022-01-01 RX ORDER — HEPARIN SODIUM (PORCINE) LOCK FLUSH IV SOLN 100 UNIT/ML 100 UNIT/ML
SOLUTION INTRAVENOUS
Status: COMPLETED
Start: 2022-01-01 | End: 2022-01-01

## 2022-01-01 RX ORDER — MIRTAZAPINE 15 MG/1
TABLET, FILM COATED ORAL
COMMUNITY
Start: 2022-01-01

## 2022-01-01 RX ORDER — ONDANSETRON 4 MG/1
8 TABLET, FILM COATED ORAL
Qty: 120 EACH | Refills: 2 | Status: SHIPPED | OUTPATIENT
Start: 2022-01-01 | End: 2022-01-01

## 2022-01-01 RX ORDER — SODIUM CHLORIDE 9 MG/ML
1000 INJECTION, SOLUTION INTRAVENOUS ONCE
Status: COMPLETED | OUTPATIENT
Start: 2022-01-01 | End: 2022-01-01

## 2022-01-01 RX ORDER — HEPARIN SODIUM (PORCINE) LOCK FLUSH IV SOLN 100 UNIT/ML 100 UNIT/ML
500 SOLUTION INTRAVENOUS PRN
Status: DISCONTINUED | OUTPATIENT
Start: 2022-01-01 | End: 2022-01-01 | Stop reason: HOSPADM

## 2022-01-01 RX ORDER — MIRTAZAPINE 15 MG/1
15 TABLET, FILM COATED ORAL NIGHTLY
Qty: 30 TABLET | Refills: 2 | Status: SHIPPED | OUTPATIENT
Start: 2022-01-01 | End: 2022-01-01

## 2022-01-01 RX ORDER — ACETAMINOPHEN 500 MG
500-1000 TABLET ORAL 3 TIMES DAILY
COMMUNITY

## 2022-01-01 RX ORDER — HEPARIN SODIUM (PORCINE) LOCK FLUSH IV SOLN 100 UNIT/ML 100 UNIT/ML
300-500 SOLUTION INTRAVENOUS PRN
Status: DISCONTINUED | OUTPATIENT
Start: 2022-01-01 | End: 2022-01-01 | Stop reason: HOSPADM

## 2022-01-01 RX ADMIN — IOHEXOL 100 ML: 350 INJECTION, SOLUTION INTRAVENOUS at 14:15

## 2022-01-01 RX ADMIN — IOHEXOL 100 ML: 350 INJECTION, SOLUTION INTRAVENOUS at 11:20

## 2022-01-01 RX ADMIN — HEPARIN 500 UNITS: 100 SYRINGE at 11:37

## 2022-01-01 RX ADMIN — HEPARIN 500 UNITS: 100 SYRINGE at 13:49

## 2022-01-01 RX ADMIN — SODIUM CHLORIDE 1000 ML: 9 INJECTION, SOLUTION INTRAVENOUS at 13:15

## 2022-01-01 RX ADMIN — HEPARIN 500 UNITS: 100 SYRINGE at 12:28

## 2022-01-01 ASSESSMENT — FIBROSIS 4 INDEX
FIB4 SCORE: 5.92
FIB4 SCORE: 2.24
FIB4 SCORE: 4.12
FIB4 SCORE: 5.92

## 2022-01-01 ASSESSMENT — ENCOUNTER SYMPTOMS
PSYCHIATRIC NEGATIVE: 1
RESPIRATORY NEGATIVE: 1
CARDIOVASCULAR NEGATIVE: 1
CONSTITUTIONAL NEGATIVE: 1
NEUROLOGICAL NEGATIVE: 1
MUSCULOSKELETAL NEGATIVE: 1
EYES NEGATIVE: 1
GASTROINTESTINAL NEGATIVE: 1

## 2022-01-01 ASSESSMENT — PAIN DESCRIPTION - PAIN TYPE: TYPE: CHRONIC PAIN

## 2022-01-01 ASSESSMENT — PAIN SCALES - GENERAL
PAINLEVEL: NO PAIN
PAINLEVEL: NO PAIN
PAINLEVEL: 3=SLIGHT PAIN

## 2022-01-03 NOTE — NON-PROVIDER
Patient was seen today in clinic with Dr. Jimenez for Follow up.  Vitals signs and weight were obtained and pain assessment was completed.  Allergies and medications were reviewed with the patient.  Toxicities of treatment assessed.     Vitals/Pain:  Vitals:    01/03/22 1249   BP: 114/65   Pulse: 87   Temp: 37.1 °C (98.7 °F)   SpO2: 96%   Weight: 76.8 kg (169 lb 5 oz)   Pain Score: No pain        Allergies:   Morphine    Current Medications:  Current Outpatient Medications   Medication Sig Dispense Refill   • ibuprofen (MOTRIN) 600 MG Tab Take 1 Tablet by mouth in the morning, at noon, and at bedtime. 45 Tablet    • colchicine (COLCRYS) 0.6 MG Tab Take 1 Tablet by mouth every day. 30 Tablet 1   • amoxicillin-clavulanate (AUGMENTIN) 875-125 MG Tab Take 1 Tablet by mouth 2 times a day. 14 Tablet 0   • Lactobacillus (FLORANEX) Tab Take 1 Tablet by mouth 2 times a day. 14 Tablet 0   • capecitabine (XELODA) 500 MG tablet TAKE 3 TABLETS BY MOUTH TWICE DAILY MONDAY TO FRIDAY WITH RADIATION     • ondansetron (ZOFRAN) 4 MG Tab tablet Take 8 mg by mouth 2 (two) times a day.     • finasteride (PROSCAR) 5 MG Tab finasteride 5 mg tablet   Take 1 tablet every day by oral route.     • omeprazole (PRILOSEC) 40 MG delayed-release capsule Take 40 mg by mouth every day.     • ALLOPURINOL PO Take 500 mg by mouth every day.     • vitamin D (VITAMIND D3) 1000 UNIT Tab Take 1,000 Units by mouth every day.     • cyanocobalamin (VITAMIN B12) 1000 MCG Tab Take  by mouth every day.     • metFORMIN (GLUCOPHAGE) 500 MG Tab Take 500 mg by mouth 2 times a day.     • oxyCODONE-acetaminophen (PERCOCET) 5-325 MG Tab Take 2 Tablets by mouth every four hours as needed.     • tamsulosin (FLOMAX) 0.4 MG capsule Take 0.8 mg by mouth every day.     • atorvastatin (LIPITOR) 10 MG Tab Take 10 mg by mouth every evening.     • gabapentin (NEURONTIN) 300 MG Cap Take 300 mg by mouth 3 times a day.     • valsartan (DIOVAN) 160 MG Tab Take 160 mg by mouth every  day.     • FINASTERIDE PO Take  by mouth.       No current facility-administered medications for this encounter.         PCP:  Elie Roman, Med Ass't

## 2022-01-05 NOTE — PROGRESS NOTES
RADIATION ONCOLOGY FOLLOW-UP    DATE OF SERVICE: 1/3/2022    IDENTIFICATION:   A 76 y.o. 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 on 7/29/2021  Total positive nodes: 0      RADIATION SUMMARY:  [facility] Treatment Information        Some values may be hidden. Unless noted otherwise, only the newest values recorded on each date are displayed.         [facility] Treatment Summary 11/19/21   Course First Treatment Date 10/11/2021   Course Last Treatment Date 11/18/2021   Pancreas Plan from Course C1_Pancreas   Pancreas Reference Point from Course C1_Pancreas   Pancreas CP Reference Point from Course C1_Pancreas             HISTORY OF PRESENT ILLNESS:   Patient originally presented with fatigue abdominal pain and dark urine and pale stools with signs of early jaundice and was sent to the ER and had a CAT scan at [facility] in February which showed a pancreatic head mass extending up towards the neck with narrowing of the portal vein SMV confluence as well as abutment of the common hepatic artery with no signs of metastatic disease.  There was a PET scan February 22, 2021 which showed uptake within the head of the pancreas as well as a pituitary gland mass.  Patient had an ERCP with stent placement with bx showing adenocarcinoma. EUS 3/24/21 by Dr. Gaines showing 29mm abutting portal vein, but clear plane between tumor and celiac and SMA.  He was seen by originally seen by Dr. De and then switched to Dr. Greer and started chemotherapy with FOLFIRINOX.  Patient has completed 7 cycles and had an interval CT chest abdomen pelvis July 11, 2021 which shows mass in the pancreatic head and uncinate process not significant change from prior with encasement of SMV and SMA.  Of night though his CEA has dropped from 711 down to 375.  Patient is feeling well overall  but does have some neuropathy and some crampy abdominal pain.    Interval History:  Patient doing well after his chemoradiation therapy completed November 18, 2021.  Patient had restaging CT CAP and tumor still involves SMA and discussion with hepatobiliary surgery Dr. Hector is categorically unresectable at this time.  Patient is recovering from his recent hospitalization for urinary tract infection gaining his energy back.    PROBLEM LIST:  Patient Active Problem List   Diagnosis   • Malignant neoplasm of head of pancreas (HCC)   • Abnormal CT of the abdomen   • Abdominal pain   • Elevated CA 19-9 level   • Jaundice   • DM (diabetes mellitus) (HCC)   • BPH (benign prostatic hyperplasia)   • Hypertension   • Hemorrhagic disorder (HCC)       CURRENT MEDICATIONS:  Current Outpatient Medications   Medication Sig Dispense Refill   • ibuprofen (MOTRIN) 600 MG Tab Take 1 Tablet by mouth in the morning, at noon, and at bedtime. 45 Tablet    • colchicine (COLCRYS) 0.6 MG Tab Take 1 Tablet by mouth every day. 30 Tablet 1   • amoxicillin-clavulanate (AUGMENTIN) 875-125 MG Tab Take 1 Tablet by mouth 2 times a day. 14 Tablet 0   • Lactobacillus (FLORANEX) Tab Take 1 Tablet by mouth 2 times a day. 14 Tablet 0   • capecitabine (XELODA) 500 MG tablet TAKE 3 TABLETS BY MOUTH TWICE DAILY MONDAY TO FRIDAY WITH RADIATION     • ondansetron (ZOFRAN) 4 MG Tab tablet Take 8 mg by mouth 2 (two) times a day.     • finasteride (PROSCAR) 5 MG Tab finasteride 5 mg tablet   Take 1 tablet every day by oral route.     • omeprazole (PRILOSEC) 40 MG delayed-release capsule Take 40 mg by mouth every day.     • ALLOPURINOL PO Take 500 mg by mouth every day.     • vitamin D (VITAMIND D3) 1000 UNIT Tab Take 1,000 Units by mouth every day.     • cyanocobalamin (VITAMIN B12) 1000 MCG Tab Take  by mouth every day.     • metFORMIN (GLUCOPHAGE) 500 MG Tab Take 500 mg by mouth 2 times a day.     • oxyCODONE-acetaminophen (PERCOCET) 5-325 MG Tab Take 2  Tablets by mouth every four hours as needed.     • tamsulosin (FLOMAX) 0.4 MG capsule Take 0.8 mg by mouth every day.     • atorvastatin (LIPITOR) 10 MG Tab Take 10 mg by mouth every evening.     • gabapentin (NEURONTIN) 300 MG Cap Take 300 mg by mouth 3 times a day.     • valsartan (DIOVAN) 160 MG Tab Take 160 mg by mouth every day.     • FINASTERIDE PO Take  by mouth.       No current facility-administered medications for this encounter.       ALLERGIES:  Morphine    REVIEW OF SYSTEMS:  A review of systems for today's date of service was reviewed and uploaded into the electronic medical record.    PHYSICAL EXAM:  PERFORMANCE STATUS:  ECOG Performance Review 7/29/2021   ECOG Performance Status Restricted in physically strenuous activity but ambulatory and able to carry out work of a light or sedentary nature, e.g., light house work, office work   Some recent data might be hidden     Karnofsky Score 7/29/2021   Karnofsky Score 70   Some recent data might be hidden     /65   Pulse 87   Temp 37.1 °C (98.7 °F)   Wt 76.8 kg (169 lb 5 oz)   SpO2 96%   BMI 22.96 kg/m²   Physical Exam  Constitutional:       Appearance: Normal appearance.   HENT:      Head: Normocephalic.      Mouth/Throat:      Mouth: Mucous membranes are moist.   Eyes:      Pupils: Pupils are equal, round, and reactive to light.   Cardiovascular:      Rate and Rhythm: Normal rate.   Abdominal:      General: There is no distension.      Palpations: Abdomen is soft.   Skin:     Findings: No erythema.   Neurological:      General: No focal deficit present.      Mental Status: He is alert.   Psychiatric:         Mood and Affect: Mood normal.         LABORATORY DATA:   Lab Results   Component Value Date/Time    WBC 4.7 (L) 12/30/2021 0550    WBC 4.9 12/29/2021 1330    WBC 4.4 (L) 12/20/2021 1030    HEMOGLOBIN 9.7 (L) 12/30/2021 0550    HEMOGLOBIN 10.1 (L) 12/29/2021 1330    HEMOGLOBIN 12.6 (L) 12/20/2021 1030    HEMATOCRIT 28.3 (L) 12/30/2021 0550     HEMATOCRIT 30.2 (L) 12/29/2021 1330    HEMATOCRIT 37.8 (L) 12/20/2021 1030    MCV 99.6 (H) 12/30/2021 0550    .7 (H) 12/29/2021 1330    .3 (H) 12/20/2021 1030    PLATELETCT 150 12/30/2021 0550    PLATELETCT 138 12/29/2021 1330    PLATELETCT 155 12/20/2021 1030    NEUTS 3.58 04/07/2021 1740      Lab Results   Component Value Date/Time    SODIUM 135 (L) 12/30/2021 0550    SODIUM 136 12/29/2021 1330    SODIUM 140 12/20/2021 1030    POTASSIUM 3.3 (L) 12/30/2021 0550    POTASSIUM 3.6 12/29/2021 1330    POTASSIUM 3.6 12/20/2021 1030    BUN 6 (L) 12/30/2021 0550    BUN 7 12/29/2021 1330    BUN 8 12/20/2021 1030    CREATININE 0.5 (L) 12/30/2021 0550    CREATININE 0.6 (L) 12/29/2021 1330    CREATININE 0.6 (L) 12/20/2021 1030    CALCIUM 7.6 (L) 12/30/2021 0550    CALCIUM 8.2 (L) 12/29/2021 1330    CALCIUM 9.4 12/20/2021 1030    ALBUMIN 2.7 (L) 12/29/2021 1330    ALBUMIN 3.4 12/20/2021 1030    ALBUMIN 2.3 (L) 08/29/2021 0600    ASTSGOT 35 12/29/2021 1330    ASTSGOT 38 (H) 12/20/2021 1030    ASTSGOT 45 (H) 08/29/2021 0600    ALKPHOSPHAT 76 12/29/2021 1330    ALKPHOSPHAT 93 12/20/2021 1030    ALKPHOSPHAT 111 08/29/2021 0600    IFNOTAFR >60 12/30/2021 0550    IFNOTAFR >60 12/29/2021 1330    IFNOTAFR >60 12/20/2021 1030       RADIOLOGY DATA:  CT-ABDOMEN WITH & W/O, PELVIS WITH    Result Date: 12/27/2021 12/27/2021 9:09 AM HISTORY/REASON FOR EXAM:  Pancreatic adenocarcinoma, monitor; pancreas protocol. TECHNIQUE/EXAM DESCRIPTION:  CT scan of the abdomen without and with contrast. Initial precontrast images were obtained from the diaphragmatic domes through the iliac crests using helical technique.  Following nonionic contrast administration in an intravenous bolus fashion, and postcontrast, thin-section helical scanning obtained from the diaphragmatic domes through the iliac crests. Additional contrast enhanced helical scanning of the pelvis was obtained from the iliac crest through the pubic symphysis. 100 mL of  Omnipaque 350 nonionic contrast was administered without complication. Low dose optimization technique was utilized for this CT exam including automated exposure control and adjustment of the mA and/or kV according to patient size. COMPARISON:  Same day CT chest, CT Chest Abdomen Pelvis with 7/10/2021 FINDINGS: CT Abdomen and Pelvis: Liver: Again noted atrophy of the left hepatic lobe. No focal liver mass. The hepatic and portal veins are patent. Gallbladder: There is air within the gallbladder. Biliary: There is a biliary stent with pneumobilia.. Spleen: Spleen is not enlarged. . Pancreas: There is diffuse pancreatic ductal dilation. Pancreatic mass involving the pancreatic head and uncinate process is not significantly changed in size. The SMV is completely encased and is narrowed, unchanged. There is ill-defined soft tissue surrounding celiac axis. Superior mesenteric artery is encased by the pancreatic mass. The pancreatic mass abuts the third portion the duodenum and wall excluded. Portal and splenic veins are patent. Adrenal glands: No adrenal mass is seen. . Kidneys: There is no evidence of hydronephrosis. Small hypodense renal lesions bilaterally are unchanged Vasculature: Diffuse atherosclerosis. No aneurysm.. Lymph nodes: No inguinal, retroperitoneal or mesenteric lymphadenopathy is seen. . Bowel: No obstruction or acute inflammation. Appendix is normal. Peritoneum: No free intraperitoneal fluid or air is seen. . Pelvis: There is diffuse bladder wall thickening. Unremarkable appearance of the prostate. Musculoskeletal: Multilevel degenerative changes are noted. There are densely sclerotic lesions in the pelvis that are likely bone islands. These were present on the prior study.     1.  Pancreatic head and uncinate process mass is not significantly changed in size. Again noted is encasement of the SMV and SMA. 2.  Unchanged position of the biliary stent with expected pneumobilia and air within the  gallbladder. 3.   There is diffuse bladder wall thickening. Recommend correlation with urinalysis.     CT-CHEST (THORAX) WITH    Result Date: 12/27/2021 12/27/2021 9:08 AM HISTORY/REASON FOR EXAM:  Pancreatic adenocarcinoma, staging; pancreas cancer restaging. TECHNIQUE/EXAM DESCRIPTION: CT scan of the chest with contrast. Thin-section helical images were obtained from the lung apices through the adrenal glands following the bolus administration of contrast. 100 mL of Omnipaque 350 nonionic contrast was utilized. Low dose optimization technique was utilized for this CT exam including automated exposure control and adjustment of the mA and/or kV according to patient size. COMPARISON 11/10/2021 FINDINGS: Lungs: Emphysematous changes are again noted. Small, ill-defined areas of groundglass attenuation, for example in the right upper lobe on series 5 image 53 and right middle lobe on series 5 image 73, may be infectious or inflammatory. There is subsegmental bibasilar atelectasis. The central airways are patent and there is no bronchiectasis.. Mediastinum/Jolly: There is a new superior mediastinal lymph node that is rounded and measuring 0.80 centimeters on series 3 image 113.. Pleura: No pleural effusion. Cardiac: Cardiac chambers are normal in size. There is new, small pericardial effusion that appears to have an enhancing rim.. Vascular: Moderate atherosclerotic disease and coronary artery calcifications. No proximal pulmonary embolism. The aorta is normal in caliber.. Soft tissues: Bilateral gynecomastia is noted. Normal appearance of the thyroid. A port is present in the right chest the tip terminating in the superior vena cava.. Bones: No suspicious osseous abnormality.. Findings in the abdomen and pelvis are reported separately.     1.  Small, ill-defined areas of groundglass attenuation may be infectious or inflammatory. Attention on follow-up imaging is recommended. 2.  There is a new, small superior single  lymph node that is rounded measuring 0.8 cm. 3.  There is a new, small pericardial effusion appears enhancing rim. 4.  Emphysematous changes are again noted. Fleischner Society pulmonary nodule recommendations: Not Applicable     CT-HEAD W/O    Result Date: 12/29/2021  HISTORY/REASON FOR EXAM:  Dizziness, non-specific. TECHNIQUE/EXAM DESCRIPTION: CT scan of the brain without contrast. 12/29/2021 4:03 PM. CT scan of the brain was carried out without contrast in the normal manner. Both automated exposure control and Automated adjustment of tube current based on patient size are utilized. Total DLP: 787 mGy*cm COMPARISON: None. FINDINGS: There is no evidence of mass, mass effect, hemorrhage, or extraaxial fluid collection.  There is no midline shift. Age-related atrophy Diffuse lucencies in the periventricular and some cortical white matter There is no fracture or other acute bony abnormality seen. Visualized paranasal sinuses: Clear.     No acute intracranial process Chronic small vessel ischemic changes DLP Reporting Thresholds for Incorrect/Repeated Exams - DLP in mGy*cm Head/Neck:  0-year-old 3840, 1-year-old 5880, 5-year-old 8770, 10-year-old 84732 and adult 18805 Head:  0-year-old 4540, 1-year-old 7460, 5-year-old 78262, 10-year-old 11916 and adult 55020 Neck:  0-year-old 2940, 1-year-old 4160, 5-year-old 4550, 10-year-old 6320 and adult 8470 Chest:  0-year-old 550, 1-year-old 830, 5-year-old 1200, 10-year-old 3840 and adult 3570 Abd/pelvis:  0-year-old 440, 1-year-old 720, 5-year-old 1080, 10-year-old 3330 and adult 3330 Trunk(C/A/P):  0-year-old 490, 1-year-old 770, 5-year-old 1140, 10-year-old 3570 and adult 3330    DX-CHEST-PORTABLE (1 VIEW)    Result Date: 12/29/2021  HISTORY/REASON FOR EXAM:  CHEST PAIN; SHORTNESS OF BREATH. TECHNIQUE/EXAM DESCRIPTION AND NUMBER OF VIEWS: Chest x-ray (one view), 12/29/2021 1:09 PM. COMPARISON: 8/29/2021 FINDINGS: Improved aeration at the left lung base. The remaining lungs  are stable The cardiac and mediastinal silhouette are unremarkable No large effusions     Improved aeration at the left lung base Lazarus Alvarez MD 12/29/2021 1:20 PM     NM-HEART MUSCLE IMAGE,SPECT MULT    Result Date: 12/30/2021  HISTORY/REASON FOR EXAM:  Chest pain, ACS suspected; chest pain; lexiscan. lexiscan TECHNIQUE/EXAM DESCRIPTION AND NUMBER OF VIEWS: Nuclear medicine myocardial perfusion scan - SPECT - multiple, with wall motion and calculation of ejection fraction.   12/30/2021 7:05 AM. One day protocol was performed with Lexiscan The rest portion was carried out with 11.4 mCi Technitium-99m Cardiolite administered by the nuclear medicine technologist at 7:00 AM hours on December 30, 2021 into the port IV. The stress portion was carried out with 0.4mg IV Lexiscan.  .  Followed by 31.4 mCi Technitium-99m Cardiolite  administered by the nuclear medicine technologist at 8:30 AM hours on December 30, 2021 into the port IV. SPECT, stress, and rest imaging was performed.  Gated wall motion was performed.  Ejection fraction was calculated. COMPARISON: None. FINDINGS: Myocardial perfusion: There is normal myocardial distribution of tracer. TID: 1.02 which is normal. Wall motion:  Gated wall motion is normal. End diastolic volume:  60 mL. End systolic volume:  24 mL. Ejection fraction:   60%%.     1.  No evidence for Lexiscan induced ischemia or infarction. 2.  Normal wall motion. 3.  Ejection fraction 60%%. Massimo Sylvester MD 12/30/2021 10:42 AM    CT-CTA CHEST PULMONARY ARTERY W/ RECONS    Result Date: 12/29/2021  HISTORY/REASON FOR EXAM:  PE suspected, high prob; Pleuritic chest pain, dyspnea; History of pancreatic cancer History of pancreatic cancer TECHNIQUE/EXAM DESCRIPTION: CTA of the chest with contrast for pulmonary arteries. MIP reformatted images were performed. Both automated exposure control and Automated adjustment of tube current based on patient size are utilized.  12/29/2021 4:02 PM Total DLP: 291  mGy*cm COMPARISON: None. FINDINGS: LUNGS: Dependent atelectasis with trace bilateral pleural effusions PULMONARY ARTERIES: There is no evidence for pulmonary embolism. HEART: Normal in size. Pericardial effusion measuring 18 mm in maximal thickness. Coronary artery calcifications AORTA: Atherosclerosis. No dilatation MEDIASTINUM: No pathologically enlarged adenopathy. Views the upper abdomen show partially visualized biliary stent Degenerative changes of the spine     Pericardial effusion Lazarus Alvarez MD 12/29/2021 4:33 PM DLP Reporting Thresholds for Incorrect/Repeated Exams - DLP in mGy*cm Head/Neck:  0-year-old 3840, 1-year-old 5880, 5-year-old 8770, 10-year-old 90611 and adult 57037 Head:  0-year-old 4540, 1-year-old 7460, 5-year-old 65663, 10-year-old 84848 and adult 41829 Neck:  0-year-old 2940, 1-year-old 4160, 5-year-old 4550, 10-year-old 6320 and adult 8470 Chest:  0-year-old 550, 1-year-old 830, 5-year-old 1200, 10-year-old 3840 and adult 3570 Abd/pelvis:  0-year-old 440, 1-year-old 720, 5-year-old 1080, 10-year-old 3330 and adult 3330 Trunk(C/A/P):  0-year-old 490, 1-year-old 770, 5-year-old 1140, 10-year-old 3570 and adult 3330 HISTORY/REASON FOR EXAM:  PE suspected, high prob; Pleuritic chest pain, dyspnea; History of pancreatic cancer. History of pancreatic cancer TECHNIQUE/EXAM DESCRIPTION: ,  12/29/2021 4:02 PM. COMPARISON: None. FINDINGS:     EC-ECHOCARDIOGRAM COMPLETE W/O CONT    Result Date: 12/30/2021  Transthoracic Echo Report Echocardiography Laboratory CONCLUSIONS Fair quality study. The left ventricular ejection fraction is visually estimated to be 55%. Estimated right ventricular systolic pressure is 25 mmHg. Trivial pericardial effusion, probably fat pad also. No previous Echo for comparison. PHOEBE ELMORE Exam Date:         12/30/2021                    11:12 Exam Location:     Echo Lab Priority:          Routine Ordering Physician:        ODETTE MUELLER Referring Physician: Sonographer:                MS Age:    76     Gender:    M MRN:    [id number] :    1945 BSA:    2.01   Ht (in):    71.7   Wt (lb):    176 Exam Type:     Complete Indications:     Shortness of breath ICD Codes:       R06.02 CPT Codes:       38327 BP:   119    /   74     HR: Technical Quality:       Fair MEASUREMENTS  (Male / Female) Normal Values 2D ECHO LV Diastolic Diameter PLAX        4.8 cm                4.2 - 5.9 / 3.9 - 5.3 cm LV Systolic Diameter PLAX         3.5 cm                2.1 - 4.0 cm IVS Diastolic Thickness           1 cm                  LVPW Diastolic Thickness          1.3 cm                LVOT Diameter                     2 cm                  Aortic Root Diameter              3.3 cm                Estimated LV Ejection Fraction    55 %                  DOPPLER AV Peak Velocity                  0.71 m/s              AV Peak Gradient                  2 mmHg                AV Mean Gradient                  2.8 mmHg              LVOT Peak Velocity                0.57 m/s              AV Area Cont Eq vti               3.1 cm2               Mitral E Point Velocity           0.55 m/s              Mitral E to A Ratio               0.81                  MV Pressure Half Time             76.7 ms               MV Area PHT                       2.9 cm2               MV Deceleration Time              264 ms                MR ERO PISA                       1.3 cm2               MR Regurgitant Volume PISA        65.9 cm3              TR Peak Velocity                  232 cm/s              Right Atrial Pressure             3 mmHg                Right Ventricular Systolic Press  24.6 mmHg             PV Peak Velocity                  0.53 m/s              PV Peak Gradient                  1.1 mmHg              Pulmonary Artery Diastolic Press  3.5 mmHg              RVOT Peak Velocity                0.58 m/s              * Indicates values subject to auto-interpretation LV EF:  55    % FINDINGS Left Ventricle The left  ventricle is normal in size and thickness.  The left ventricular ejection fraction is visually estimated to be 55%. Normal left ventricular systolic function. Normal regional wall motion. Normal diastolic function. Right Ventricle The right ventricle is normal in size and systolic function. Right Atrium The right atrium is normal in size. Normal inferior vena cava size and inspiratory collapse. Left Atrium The left atrium is normal in size. Left atrial volume index is 18 mL/sq m. Mitral Valve Structurally normal mitral valve without significant stenosis. Moderate mitral regurgitation. Aortic Valve Structurally normal aortic valve without significant stenosis. Trace aortic insufficiency. Tricuspid Valve Structurally normal tricuspid valve without significant stenosis. Right atrial pressure is estimated to be 3 mmHg. Estimated right ventricular systolic pressure is 25 mmHg. Trace to Mild tricuspid regurgitation. Pulmonic Valve Structurally normal pulmonic valve without significant stenosis. Trace pulmonic insufficiency. Pericardium Trivial pericardial effusion, probably fat pad also. Aorta Normal aortic root for body surface area. The ascending aorta diameter is 3.1 cm. Maria Esther Viveros MD (Electronically Signed) Final Date:     30 December 2021                 13:49      IMPRESSION:    A 76 y.o. with   Malignant neoplasm of head of pancreas (HCC)  Staging form: Exocrine Pancreas, AJCC 8th Edition  - Clinical stage from 7/29/2021: Stage III (cT4, cN0, cM0) - Signed by Art Jimenez M.D. on 7/29/2021  Total positive nodes: 0      CANCER STATUS:  Disease Stable    RECOMMENDATIONS:   At this point patient has categorically unresectable pancreatic cancer at this time per Dr. Hector.  Recommend repeat CT chest abdomen pelvis in 3 months and follow-up with Dr. Greer with CA 19-9 as well.  I will see her back in 3 months.    Thank you for the opportunity to participate in his care.  If any questions or comments, please do  not hesitate in calling.    CC: Dr. Greer

## 2022-01-05 NOTE — TELEPHONE ENCOUNTER
GI Tumor Board 1/5/22  -Discussed with Dr. Hector not a surgical candidate due to vessel involvment  -will plan on 3 month restaging scan  -Ca199  -follow up with Dr. Greer and Dr. Tony main 3months

## 2022-01-19 PROBLEM — I31.9: Status: ACTIVE | Noted: 2022-01-01

## 2022-01-20 PROBLEM — C25.9 PANCREATIC CANCER (HCC): Status: ACTIVE | Noted: 2021-03-30

## 2022-01-20 PROBLEM — N39.0 UTI (URINARY TRACT INFECTION): Status: ACTIVE | Noted: 2022-01-01

## 2022-01-24 NOTE — TELEPHONE ENCOUNTER
Received request via: Patient    Was the patient seen in the last year in this department? Yes    Does the patient have an active prescription (recently filled or refills available) for medication(s) requested? daughter called stating that her father is running low on his med

## 2022-03-23 NOTE — ED NOTES
IV started, blood sent to lab.   Fluconazole Counseling:  Patient counseled regarding adverse effects of fluconazole including but not limited to headache, diarrhea, nausea, upset stomach, liver function test abnormalities, taste disturbance, and stomach pain.  There is a rare possibility of liver failure that can occur when taking fluconazole.  The patient understands that monitoring of LFTs and kidney function test may be required, especially at baseline. The patient verbalized understanding of the proper use and possible adverse effects of fluconazole.  All of the patient's questions and concerns were addressed.

## 2022-03-31 NOTE — PROGRESS NOTES
Port to RCW accessed per protocol with Mcgregor needle. Port flushes easily with brisk blood return. Port deaccessed per protocol flushing with 20ml normal saline and 500 units heparin. Blood return verified prior to deaccess. Patient tolerated well with minor discomfort.

## 2022-04-01 NOTE — NON-PROVIDER
Patient was seen today in clinic with Dr. Jimenez for Follow Up.  Vitals signs and weight were obtained and pain assessment was completed.  Allergies and medications were reviewed with the patient.  Toxicities of treatment assessed.     Vitals/Pain:  Vitals:    04/01/22 1306   BP: 126/82   Pulse: 77   Temp: 36.4 °C (97.5 °F)   SpO2: 95%   Weight: 74.3 kg (163 lb 12.8 oz)   Pain Score: No pain        Allergies:   Colchicine and Morphine    Current Medications:  Current Outpatient Medications   Medication Sig Dispense Refill   • diphenoxylate-atropine (LOMOTIL) 2.5-0.025 MG Tab Take 1 Tablet by mouth 4 times a day as needed for Diarrhea for up to 14 days. 30 Tablet 0   • ondansetron (ZOFRAN) 4 MG Tab tablet Take 1 Tablet by mouth every four hours as needed for Nausea/Vomiting for up to 90 days. 60 Tablet 3   • pancrelipase, Lip-Prot-Amyl, (CREON 6000) 6000-79290 units Cap DR Particles Take 2 Capsules by mouth 3 times a day with meals. Home Med 450 Capsule 0   • predniSONE (DELTASONE) 20 MG Tab 3 po daily x 3 days, then 2 po daily 30 Tablet 0   • ondansetron (ZOFRAN) 4 MG Tab tablet Take 8 mg by mouth 2 (two) times a day.     • ALLOPURINOL PO Take 500 mg by mouth every day.     • vitamin D (VITAMIND D3) 1000 UNIT Tab Take 1,000 Units by mouth every day.     • cyanocobalamin (VITAMIN B12) 1000 MCG Tab Take  by mouth every day.     • metFORMIN (GLUCOPHAGE) 500 MG Tab Take 500 mg by mouth 2 times a day.     • tamsulosin (FLOMAX) 0.4 MG capsule Take 0.8 mg by mouth every day.     • atorvastatin (LIPITOR) 10 MG Tab Take 10 mg by mouth every evening.     • gabapentin (NEURONTIN) 300 MG Cap Take 300 mg by mouth 3 times a day.     • valsartan (DIOVAN) 160 MG Tab Take 160 mg by mouth every day.     • FINASTERIDE PO Take  by mouth.       No current facility-administered medications for this encounter.         PCP:  [facility]

## 2022-04-01 NOTE — PROGRESS NOTES
RADIATION ONCOLOGY FOLLOW-UP    DATE OF SERVICE: 4/1/2022    IDENTIFICATION:   A 76 y.o. male with .    Visit Diagnoses     ICD-10-CM   1. Malignant neoplasm of head of pancreas (HCC)  C25.0     Pancreatic cancer (HCC)  Staging form: Exocrine Pancreas, AJCC 8th Edition  - Clinical stage from 7/29/2021: Stage III (cT4, cN0, cM0) - Signed by Art Jimenez M.D. on 7/29/2021  Total positive nodes: 0      RADIATION SUMMARY:  [facility] Treatment Information        Some values may be hidden. Unless noted otherwise, only the newest values recorded on each date are displayed.         [facility] Treatment Summary 11/19/21   Course First Treatment Date 10/11/2021   Course Last Treatment Date 11/18/2021   Pancreas Plan from Course C1_Pancreas   Pancreas Reference Point from Course C1_Pancreas   Pancreas CP Reference Point from Course C1_Pancreas             HISTORY OF PRESENT ILLNESS:   Patient originally presented with fatigue abdominal pain and dark urine and pale stools with signs of early jaundice and was sent to the ER and had a CAT scan at [facility] in February which showed a pancreatic head mass extending up towards the neck with narrowing of the portal vein SMV confluence as well as abutment of the common hepatic artery with no signs of metastatic disease.  There was a PET scan February 22, 2021 which showed uptake within the head of the pancreas as well as a pituitary gland mass.  Patient had an ERCP with stent placement with bx showing adenocarcinoma. EUS 3/24/21 by Dr. Gaines showing 29mm abutting portal vein, but clear plane between tumor and celiac and SMA.  He was seen by originally seen by Dr. De and then switched to Dr. Greer and started chemotherapy with FOLFIRINOX.  Patient has completed 7 cycles and had an interval CT chest abdomen pelvis July 11, 2021 which shows mass in the pancreatic head and uncinate process not significant change from prior with encasement of SMV and SMA.  Of night though his  CEA has dropped from 711 down to 375.  Patient is feeling well overall but does have some neuropathy and some crampy abdominal pain.     01/03/22  Patient doing well after his chemoradiation therapy completed November 18, 2021.  Patient had restaging CT CAP and tumor still involves SMA and discussion with hepatobiliary surgery Dr. Hector is categorically unresectable at this time.  Patient is recovering from his recent hospitalization for urinary tract infection gaining his energy back    Interval History:  Patient doing well with increase in his energy levels he does have a slight increase in the CA 19-9 level however his CT chest abdomen pelvis from March 31, 2022 shows pancreatic mass unchanged at this point and no evidence of metastatic disease.      PROBLEM LIST:  Patient Active Problem List   Diagnosis   • Pancreatic cancer (HCC)   • Abnormal CT of the abdomen   • Abdominal pain   • Elevated CA 19-9 level   • Jaundice   • DM (diabetes mellitus) (HCC)   • BPH (benign prostatic hyperplasia)   • Hypertension   • Hemorrhagic disorder (HCC)   • Pericarditis, non-rheumatic   • UTI (urinary tract infection)       CURRENT MEDICATIONS:  Current Outpatient Medications   Medication Sig Dispense Refill   • diphenoxylate-atropine (LOMOTIL) 2.5-0.025 MG Tab Take 1 Tablet by mouth 4 times a day as needed for Diarrhea for up to 14 days. 30 Tablet 0   • ondansetron (ZOFRAN) 4 MG Tab tablet Take 1 Tablet by mouth every four hours as needed for Nausea/Vomiting for up to 90 days. 60 Tablet 3   • pancrelipase, Lip-Prot-Amyl, (CREON 6000) 6000-96611 units Cap DR Particles Take 2 Capsules by mouth 3 times a day with meals. Home Med 450 Capsule 0   • predniSONE (DELTASONE) 20 MG Tab 3 po daily x 3 days, then 2 po daily 30 Tablet 0   • ondansetron (ZOFRAN) 4 MG Tab tablet Take 8 mg by mouth 2 (two) times a day.     • ALLOPURINOL PO Take 500 mg by mouth every day.     • vitamin D (VITAMIND D3) 1000 UNIT Tab Take 1,000 Units by mouth  every day.     • cyanocobalamin (VITAMIN B12) 1000 MCG Tab Take  by mouth every day.     • metFORMIN (GLUCOPHAGE) 500 MG Tab Take 500 mg by mouth 2 times a day.     • tamsulosin (FLOMAX) 0.4 MG capsule Take 0.8 mg by mouth every day.     • atorvastatin (LIPITOR) 10 MG Tab Take 10 mg by mouth every evening.     • gabapentin (NEURONTIN) 300 MG Cap Take 300 mg by mouth 3 times a day.     • valsartan (DIOVAN) 160 MG Tab Take 160 mg by mouth every day.     • FINASTERIDE PO Take  by mouth.       No current facility-administered medications for this encounter.       ALLERGIES:  Colchicine and Morphine    REVIEW OF SYSTEMS:  A review of systems for today's date of service was reviewed and uploaded into the electronic medical record.    PHYSICAL EXAM:  PERFORMANCE STATUS: 1  /82   Pulse 77   Temp 36.4 °C (97.5 °F)   Wt 74.3 kg (163 lb 12.8 oz)   SpO2 95%   BMI 22.22 kg/m²   Physical Exam  Vitals reviewed.   HENT:      Head: Normocephalic.   Eyes:      Pupils: Pupils are equal, round, and reactive to light.   Cardiovascular:      Rate and Rhythm: Normal rate.   Abdominal:      General: Abdomen is flat.   Musculoskeletal:         General: Normal range of motion.   Neurological:      General: No focal deficit present.      Mental Status: He is alert.   Psychiatric:         Mood and Affect: Mood normal.         LABORATORY DATA:   Lab Results   Component Value Date/Time    WBC 2.8 (L) 01/20/2022 0620    WBC 4.7 (L) 01/19/2022 1500    WBC 4.7 (L) 12/30/2021 0550    HEMOGLOBIN 10.9 (L) 01/20/2022 0620    HEMOGLOBIN 11.0 (L) 01/19/2022 1500    HEMOGLOBIN 9.7 (L) 12/30/2021 0550    HEMATOCRIT 31.9 (L) 01/20/2022 0620    HEMATOCRIT 31.9 (L) 01/19/2022 1500    HEMATOCRIT 28.3 (L) 12/30/2021 0550    MCV 97.9 (H) 01/20/2022 0620    MCV 95.8 (H) 01/19/2022 1500    MCV 99.6 (H) 12/30/2021 0550    PLATELETCT 287 01/20/2022 0620    PLATELETCT 291 01/19/2022 1500    PLATELETCT 150 12/30/2021 0550    NEUTS 3.58 04/07/2021 1748       Lab Results   Component Value Date/Time    SODIUM 136 02/09/2022 1230    SODIUM 134 (L) 01/20/2022 0620    SODIUM 129 (L) 01/19/2022 1500    POTASSIUM 4.4 02/09/2022 1230    POTASSIUM 4.4 01/20/2022 0620    POTASSIUM 4.1 01/19/2022 1500    BUN 14 02/09/2022 1230    BUN 7 01/20/2022 0620    BUN 8 01/19/2022 1500    CREATININE 0.51 02/09/2022 1230    CREATININE 0.7 (L) 01/20/2022 0620    CREATININE 0.6 (L) 01/19/2022 1500    CALCIUM 8.8 02/09/2022 1230    CALCIUM 9.0 01/20/2022 0620    CALCIUM 9.0 01/19/2022 1500    ALBUMIN 4.0 02/09/2022 1230    ALBUMIN 2.9 (L) 01/20/2022 0620    ALBUMIN 2.9 (L) 01/19/2022 1500    ASTSGOT 55 (H) 02/09/2022 1230    ASTSGOT 33 01/20/2022 0620    ASTSGOT 37 01/19/2022 1500    ALKPHOSPHAT 101 (H) 02/09/2022 1230    ALKPHOSPHAT 86 01/20/2022 0620    ALKPHOSPHAT 85 01/19/2022 1500    IFNOTAFR >60 02/09/2022 1230    IFNOTAFR >60 01/20/2022 0620    IFNOTAFR >60 01/19/2022 1500       RADIOLOGY DATA:  CT-ABDOMEN WITH & W/O, PELVIS WITH    Result Date: 3/31/2022  3/31/2022 11:36 AM HISTORY/REASON FOR EXAM:  Pancreatic adenocarcinoma, surveillance. TECHNIQUE/EXAM DESCRIPTION:  CT scan of the abdomen without and with contrast. Initial precontrast images were obtained from the diaphragmatic domes through the iliac crests using helical technique.  Following nonionic contrast administration in an intravenous bolus fashion, and postcontrast, thin-section helical scanning obtained from the diaphragmatic domes through the iliac crests. Additional contrast enhanced helical scanning of the pelvis was obtained from the iliac crest through the pubic symphysis. 100 mL of Omnipaque 350 nonionic contrast was administered without complication. Low dose optimization technique was utilized for this CT exam including automated exposure control and adjustment of the mA and/or kV according to patient size. COMPARISON:  7/10/2021 FINDINGS: Findings in the lung bases are reported separately. There is atrophy of the  left hepatic lobe. There is ill-defined hypodensity in the left hepatic lobe and caudate which were not seen previously. The hepatic and portal veins are patent. Unchanged pneumobilia secondary to a common bile duct stent. Spleen is unremarkable. There is dilation of the main pancreatic duct with atrophy of the pancreatic tail. The pancreatic mass is unchanged in size when measured in similar fashion. There is encasement of the portal vein and superior mesenteric vein as well as the superior mesenteric artery. There is increased density in the mesentery surrounding the celiac artery which is similar from prior. Adrenal glands are normal. The kidneys and unremarkable appearance with symmetric nephrograms and no hydronephrosis. Prostate and seminal vesicles are unremarkable. Normal appearance the urinary bladder. There is a large colonic stool burden. There are no dilated or inflamed loops of small or large bowel. The appendix is seen and is normal. A duodenal diverticulum is seen. There is no ascites. Unchanged enlarged aortocaval node on series 5 image 93. There is diffuse, heavy atherosclerotic burden with no aortic aneurysm. There is a fat-containing left inguinal hernia. There is multifocal degenerative change and prior L5 laminotomy defects. There is no suspicious osseous abnormality.     1.  Pancreatic mass is unchanged in size when measured in a similar fashion. Unchanged duct dilation and atrophy of the body and tail. 2.  Unchanged encasement of the portal vein, superior mesenteric vein and superior mesenteric artery. There is also ill-defined soft tissue surrounding the celiac artery which is similar from prior. 3.  Unchanged enlarged aortocaval node on series 5 image 93. 4.  Moderate colonic stool burden. 5.  Chronic atrophy of the left hepatic lobe with new ill-defined hypodensity in the left lobe and caudate.    CT-CHEST (THORAX) WITH    Result Date: 3/31/2022  3/31/2022 11:18 AM HISTORY/REASON FOR EXAM:   Pancreatic adenocarcinoma, surveillance TECHNIQUE/EXAM DESCRIPTION: CT scan of the chest with contrast. Thin-section helical images were obtained from the lung apices through the adrenal glands following the bolus administration of contrast. 100 mL of Omnipaque 350 nonionic contrast was utilized. Low dose optimization technique was utilized for this CT exam including automated exposure control and adjustment of the mA and/or kV according to patient size. COMPARISON:  12/27/2021, 7/10/2021 FINDINGS: Lungs: Mild emphysematous changes are noted. There are scattered pulmonary nodules which are similar in size from prior. A nodule in the right lung base is increased in size measuring 0.9 cm, previously 0.7 cm. There is multifocal peripheral reticulation  and linear subsegmental opacities which is likely atelectasis or scarring. Subtle groundglass opacity is noted in the right lower lobe which is nonspecific. Mediastinum/Jolly: The previously seen rounded minimally enlarged superior paratracheal lymph node has decreased in size now measuring 0.5 cm, previously 0.8 cm. There is no new lymphadenopathy. Pleura: Resolution of the trace left pleural effusion. No right pleural effusion. Cardiac: Cardiac ureters are normal in size. There is no pericardial effusion. Previously there was a small pericardial effusion. Coronary artery calcifications are present. Vascular: No proximal pulmonary embolism. Atherosclerotic disease is noted in the aorta. Soft tissues: Bilateral gynecomastia is present. There is a right chest port with the tip terminating in the mid SVC. No axillary adenopathy. Thyroid is normal. Bones: Multilevel spondylosis with no suspicious osseous abnormality. Findings in the abdomen and pelvis are reported separately.     1.  No definite findings of metastatic disease in the chest. 2.  Increased size of a pulmonary nodule in the right lower lobe now measuring 0.9 cm, previously 0.7 cm. 3.  There are scattered  pulmonary micronodules. Recommend follow-up per oncologic guidelines. 4.  Subtle groundglass opacity is noted in the right lower lobe, this is nonspecific. 5.  The previously seen rounded superior paratracheal lymph node has decreased in size. 6.  Resolution of the small left effusion and pericardial effusion. Fleischner Society pulmonary nodule recommendations: Not Applicable       IMPRESSION:    A 76 y.o. with   Visit Diagnoses     ICD-10-CM   1. Malignant neoplasm of head of pancreas (HCC)  C25.0     Pancreatic cancer (HCC)  Staging form: Exocrine Pancreas, AJCC 8th Edition  - Clinical stage from 7/29/2021: Stage III (cT4, cN0, cM0) - Signed by Art Jimenez M.D. on 7/29/2021  Total positive nodes: 0      CANCER STATUS:  Disease Stable    RECOMMENDATIONS:   I reviewed with patient his most recent imaging which shows stable disease I am slightly worried that his CA 19-9 is increasing however at this point I would repeat CT chest abdomen pelvis and CA 19-9 3 months and will follow up with him afterwards.    Thank you for the opportunity to participate in his care.  If any questions or comments, please do not hesitate in calling.    Orders Placed This Encounter   • CT-CHEST (THORAX) WITH   • CT-ABDOMEN-PELVIS WITH & W/O   • CA 19-9   • CBC WITH DIFFERENTIAL   • Comp Metabolic Panel   • diphenoxylate-atropine (LOMOTIL) 2.5-0.025 MG Tab   • ondansetron (ZOFRAN) 4 MG Tab tablet

## 2022-06-20 NOTE — PROGRESS NOTES
Telemedicine: Established Patient   This evaluation was conducted via Zoom using secure and encrypted videoconferencing technology. The patient was in their home in the [facility].    The patient's identity was confirmed and verbal consent was obtained for this virtual visit.    Subjective:   CC: pancreas cancer rising Ca199    Zeus Rodríguez is a 77 y.o. male presenting for evaluation and management of:    Patient originally presented with fatigue abdominal pain and dark urine and pale stools with signs of early jaundice and was sent to the ER and had a CAT scan at [facility] in February which showed a pancreatic head mass extending up towards the neck with narrowing of the portal vein SMV confluence as well as abutment of the common hepatic artery with no signs of metastatic disease.  There was a PET scan February 22, 2021 which showed uptake within the head of the pancreas as well as a pituitary gland mass.  Patient had an ERCP with stent placement with bx showing adenocarcinoma. EUS 3/24/21 by Dr. Gaines showing 29mm abutting portal vein, but clear plane between tumor and celiac and SMA.  He was seen by originally seen by Dr. De and then switched to Dr. Greer and started chemotherapy with FOLFIRINOX.  Patient has completed 7 cycles and had an interval CT chest abdomen pelvis July 11, 2021 which shows mass in the pancreatic head and uncinate process not significant change from prior with encasement of SMV and SMA.  Of night though his CEA has dropped from 711 down to 375.  Patient is feeling well overall but does have some neuropathy and some crampy abdominal pain.     01/03/22  Patient doing well after his chemoradiation therapy completed November 18, 2021.  Patient had restaging CT CAP and tumor still involves SMA and discussion with hepatobiliary surgery Dr. Hector is categorically unresectable at this time.  Patient is recovering from his recent hospitalization for urinary tract  infection gaining his energy back     4/1/22  Patient doing well with increase in his energy levels he does have a slight increase in the CA 19-9 level however his CT chest abdomen pelvis from March 31, 2022 shows pancreatic mass unchanged at this point and no evidence of metastatic disease.    Interval History:  Patient returns after further CA 19-9 elevation greater than 900 at this point.  He had PET CT scan which shows no evidence of distant metastatic disease faint activity within the pancreatic head.  Denies any pain but does have general fatigue.  He does have some occasional nausea symptoms.      Review of Systems   Constitutional: Negative.    HENT: Negative.    Eyes: Negative.    Respiratory: Negative.    Cardiovascular: Negative.    Gastrointestinal: Negative.    Genitourinary: Negative.    Musculoskeletal: Negative.    Skin: Negative.    Neurological: Negative.    Endo/Heme/Allergies: Negative.    Psychiatric/Behavioral: Negative.    All other systems reviewed and are negative.        Allergies   Allergen Reactions   • Colchicine Itching   • Morphine Nausea       Current medicines (including changes today)  Current Outpatient Medications   Medication Sig Dispense Refill   • predniSONE (DELTASONE) 10 MG Tab Take 1 Tablet by mouth every day for 90 days. 90 Tablet 0   • pancrelipase, Lip-Prot-Amyl, (CREON 6000) 6000-62286 units Cap DR Particles Take 2 Capsules by mouth 3 times a day with meals. Home Med 450 Capsule 0   • ondansetron (ZOFRAN) 4 MG Tab tablet Take 2 Tablets by mouth 2 (two) times a day for 90 days. 120 Each 2   • ondansetron (ZOFRAN) 4 MG Tab tablet Take 1 Tablet by mouth every four hours as needed for Nausea/Vomiting for up to 90 days. 60 Tablet 3   • ALLOPURINOL PO Take 500 mg by mouth every day.     • vitamin D (VITAMIND D3) 1000 UNIT Tab Take 1,000 Units by mouth every day.     • cyanocobalamin (VITAMIN B12) 1000 MCG Tab Take  by mouth every day.     • metFORMIN (GLUCOPHAGE) 500 MG Tab  Take 500 mg by mouth 2 times a day.     • tamsulosin (FLOMAX) 0.4 MG capsule Take 0.8 mg by mouth every day.     • atorvastatin (LIPITOR) 10 MG Tab Take 10 mg by mouth every evening.     • gabapentin (NEURONTIN) 300 MG Cap Take 300 mg by mouth 3 times a day.     • valsartan (DIOVAN) 160 MG Tab Take 160 mg by mouth every day.     • FINASTERIDE PO Take  by mouth.       No current facility-administered medications for this encounter.       Patient Active Problem List    Diagnosis Date Noted   • UTI (urinary tract infection) 01/20/2022   • Pericarditis, non-rheumatic 01/19/2022   • BPH (benign prostatic hyperplasia) 04/08/2021   • Hypertension 04/08/2021   • Hemorrhagic disorder (HCC) 04/08/2021   • Jaundice 04/07/2021   • DM (diabetes mellitus) (HCC) 04/07/2021   • Pancreatic cancer (HCC) 03/30/2021   • Abnormal CT of the abdomen 03/30/2021   • Abdominal pain 03/30/2021   • Elevated CA 19-9 level 03/30/2021       Family History   Problem Relation Age of Onset   • Cancer Mother         Leukemia   • Cancer Father         Unknown   • Cancer Sister         Breast, Uterine       He  has a past medical history of Cataract, Dental disorder, Diabetes (HCC), Heart burn, Hemorrhagic disorder (HCC), High cholesterol, Hypertension, Indigestion, Pancreas cancer (HCC), Snoring, and Urinary bladder disorder.  He  has a past surgical history that includes knee arthroplasty total (Right, 2018); cataract phaco with iol (Bilateral, 2017); other (2016); other (2017); carpal tunnel release (Bilateral, 2017); pr upper gi endoscopy,diagnosis (3/24/2021); egd w/endoscopic ultrasound (3/24/2021); egd with asp/bx (3/24/2021); pr ercp,diagnostic (N/A, 4/9/2021); pr ercp,w/removal stone,frannie/pancr ducts (N/A, 4/9/2021); and cath placement (Right, 4/9/2021).       Objective:   There were no vitals taken for this visit.    Physical Exam  HENT:      Head: Normocephalic.   Eyes:      Pupils: Pupils are equal, round, and reactive to light.   Pulmonary:       Effort: Pulmonary effort is normal.   Musculoskeletal:         General: Normal range of motion.   Neurological:      General: No focal deficit present.      Mental Status: He is alert.   Psychiatric:         Mood and Affect: Mood normal.         Assessment and Plan:   The following treatment plan was discussed:   Patient was discussed in multidisciplinary tumor board and options were consideration of EUS with biopsy but patient does not want to pursue any invasive procedures at this time.  We also discussed hospice but he is not ready for hospice quite yet.  I have recommended genetics referral for work-up.  We will order restaging scans in 3 months with labs at that time.  Additionally he would like a refill of his prednisone for his pericarditis which I said I would be willing to do.    1. Malignant neoplasm of head of pancreas (HCC)  - Referral to Genetics  - CT-CHEST,ABDOMEN,PELVIS WITH; Future  - CBC WITH DIFFERENTIAL; Future  - Comp Metabolic Panel; Future  - CA 19-9; Future    2. Pericarditis, non-rheumatic  - predniSONE (DELTASONE) 10 MG Tab; Take 1 Tablet by mouth every day for 90 days.  Dispense: 90 Tablet; Refill: 0        Follow-up: 3 month virtual visit

## 2022-09-21 NOTE — PROGRESS NOTES
Telemedicine: Established Patient   This evaluation was conducted via Zoom using secure and encrypted videoconferencing technology. The patient was in their home in the [facility].    The patient's identity was confirmed and verbal consent was obtained for this virtual visit.    Subjective:   CC: pancreas cancer rising Ca199     Zeus Rodríguez is a 77 y.o. male presenting for evaluation and management of:     Patient originally presented with fatigue abdominal pain and dark urine and pale stools with signs of early jaundice and was sent to the ER and had a CAT scan at [facility] in February which showed a pancreatic head mass extending up towards the neck with narrowing of the portal vein SMV confluence as well as abutment of the common hepatic artery with no signs of metastatic disease.  There was a PET scan February 22, 2021 which showed uptake within the head of the pancreas as well as a pituitary gland mass.  Patient had an ERCP with stent placement with bx showing adenocarcinoma. EUS 3/24/21 by Dr. Gaines showing 29mm abutting portal vein, but clear plane between tumor and celiac and SMA.  He was seen by originally seen by Dr. De and then switched to Dr. Greer and started chemotherapy with FOLFIRINOX.  Patient has completed 7 cycles and had an interval CT chest abdomen pelvis July 11, 2021 which shows mass in the pancreatic head and uncinate process not significant change from prior with encasement of SMV and SMA.  Of night though his CEA has dropped from 711 down to 375.  Patient is feeling well overall but does have some neuropathy and some crampy abdominal pain.     01/03/22  Patient doing well after his chemoradiation therapy completed November 18, 2021.  Patient had restaging CT CAP and tumor still involves SMA and discussion with hepatobiliary surgery Dr. Hector is categorically unresectable at this time.  Patient is recovering from his recent hospitalization for urinary  tract infection gaining his energy back     4/1/22  Patient doing well with increase in his energy levels he does have a slight increase in the CA 19-9 level however his CT chest abdomen pelvis from March 31, 2022 shows pancreatic mass unchanged at this point and no evidence of metastatic disease.     6/17/22  Patient returns after further CA 19-9 elevation greater than 900 at this point.  He had PET CT scan which shows no evidence of distant metastatic disease faint activity within the pancreatic head.  Denies any pain but does have general fatigue.  He does have some occasional nausea symptoms.    Interval History:  Patient had CT chest abdomen with pancreatic protocol which shows decreased size of pancreatic head mass now 3.7 x 2.5 cm consistent with a response to therapy however new retropancreatic 2.2 x 1.4 cm mass most likely a amanda metastasis.  Unchanged encasement of the celiac axis SMA and SMV, portal vein and left renal vein.  CT chest shows abnormal lung parenchyma with diffuse bilateral ill-defined airspace process.  Atypical for metastatic disease and consistent with inflammatory process.  Still having significant pain in abdomen.    Review of Systems   All other systems reviewed and are negative.      Allergies   Allergen Reactions    Colchicine Itching    Morphine Nausea       Current medicines (including changes today)  Current Outpatient Medications   Medication Sig Dispense Refill    ondansetron (ZOFRAN) 8 MG Tab Take 1 Tablet by mouth in the morning, at noon, and at bedtime for 90 days. 90 Each 2    mirtazapine (REMERON) 15 MG Tab Take 1 Tablet by mouth every evening for 90 days. 30 Tablet 2    predniSONE (DELTASONE) 20 MG Tab Take 1 Tablet by mouth every day for 90 days. 90 Tablet 0    pancrelipase, Lip-Prot-Amyl, (CREON 6000) 6000-17381 units Cap DR Particles Take 2 Capsules by mouth 3 times a day with meals. Home Med 450 Capsule 0    mirtazapine (REMERON) 15 MG Tab TAKE 1 TABLET BY MOUTH EVERY  DAY AT BEDTIME FOR APPETITE      ALLOPURINOL PO Take 500 mg by mouth every day.      vitamin D (VITAMIND D3) 1000 UNIT Tab Take 1,000 Units by mouth every day.      cyanocobalamin (VITAMIN B12) 1000 MCG Tab Take  by mouth every day.      metFORMIN (GLUCOPHAGE) 500 MG Tab Take 500 mg by mouth 2 times a day.      tamsulosin (FLOMAX) 0.4 MG capsule Take 0.8 mg by mouth every day.      atorvastatin (LIPITOR) 10 MG Tab Take 10 mg by mouth every evening.      gabapentin (NEURONTIN) 300 MG Cap Take 300 mg by mouth 3 times a day.      valsartan (DIOVAN) 160 MG Tab Take 160 mg by mouth every day.      FINASTERIDE PO Take  by mouth.       No current facility-administered medications for this encounter.       Patient Active Problem List    Diagnosis Date Noted    UTI (urinary tract infection) 01/20/2022    Pericarditis, non-rheumatic 01/19/2022    BPH (benign prostatic hyperplasia) 04/08/2021    Hypertension 04/08/2021    Hemorrhagic disorder (HCC) 04/08/2021    Jaundice 04/07/2021    DM (diabetes mellitus) (HCC) 04/07/2021    Pancreatic cancer (HCC) 03/30/2021    Abnormal CT of the abdomen 03/30/2021    Abdominal pain 03/30/2021    Elevated CA 19-9 level 03/30/2021       Family History   Problem Relation Age of Onset    Cancer Mother         Leukemia    Cancer Father         Unknown    Cancer Sister         Breast, Uterine       He  has a past medical history of Cataract, Dental disorder, Diabetes (HCC), Heart burn, Hemorrhagic disorder (HCC), High cholesterol, Hypertension, Indigestion, Pancreas cancer (HCC), Snoring, and Urinary bladder disorder.  He  has a past surgical history that includes knee arthroplasty total (Right, 2018); cataract phaco with iol (Bilateral, 2017); other (2016); other (2017); carpal tunnel release (Bilateral, 2017); pr upper gi endoscopy,diagnosis (3/24/2021); egd w/endoscopic ultrasound (3/24/2021); egd with asp/bx (3/24/2021); pr ercp,diagnostic (N/A, 4/9/2021); pr ercp,w/removal stone,frannie/pancr  "ducts (N/A, 4/9/2021); and cath placement (Right, 4/9/2021).       Objective:   /76   Pulse 74   Ht 1.803 m (5' 11\")   Wt 68 kg (150 lb)   BMI 20.92 kg/m²     Physical Exam  Neurological:      Mental Status: He is alert. Mental status is at baseline.       Assessment and Plan:   The following treatment plan was discussed:     1. Malignant neoplasm of body of pancreas (HCC)  - ondansetron (ZOFRAN) 8 MG Tab; Take 1 Tablet by mouth in the morning, at noon, and at bedtime for 90 days.  Dispense: 90 Each; Refill: 2  - Referral to Interventional Radiology  - mirtazapine (REMERON) 15 MG Tab; Take 1 Tablet by mouth every evening for 90 days.  Dispense: 30 Tablet; Refill: 2        Follow-up: 3 months recommend remeron for appetite and depression start 15mg can increase to 30mg. Referral to IR for consideration of celiac block for pain relief.           "

## 2022-10-12 NOTE — OR NURSING
COVID-19 Pre-surgery screenin. Do you have an undiagnosed respiratory illness or symptoms such as coughing or sneezing?No  a. Onset of Sx No  b. Acute vs. chronic respiratory illness No    2. Do you have an unexplained fever greater than 100.4 degrees Fahrenheit or 38 degrees Celsius?     No    3. Have you had direct exposure to a patient who tested positive for Covid-19?    No    4. Have you had any loss of your sense of taste or smell? Have you had N/V or sore throat? No    Patient has been informed of visitor policy and asked to wear a mask upon entering the hospital   Yes       Nsaids Pregnancy And Lactation Text: These medications are considered safe up to 30 weeks gestation. It is excreted in breast milk.

## 2022-10-18 NOTE — OR NURSING
Pt has port that he would like accessed for IV, etc.  Fall risk. Uses Wheelchair, and is very unsteady on his feet.  Pt with jaundice skin, and coffee colored urine when he self caths.

## 2022-10-19 NOTE — OR NURSING
Discharge information reviewed with patient and responsible adult. No questions or concerns at this time.     IV discontinued.     See vital sign flowsheets  for discharge details.     Port de-accessed, flushed per protocol.

## 2022-10-20 PROBLEM — R32 URINARY INCONTINENCE: Status: ACTIVE | Noted: 2022-01-01

## 2022-10-20 PROBLEM — E87.20 LACTIC ACIDOSIS: Status: ACTIVE | Noted: 2022-01-01

## 2022-10-20 PROBLEM — A41.9 SEPSIS (HCC): Status: ACTIVE | Noted: 2022-01-01

## 2022-10-20 PROBLEM — Z98.890 HISTORY OF BILIARY STENT INSERTION: Status: ACTIVE | Noted: 2022-01-01

## 2022-10-20 PROBLEM — R19.7 DIARRHEA: Status: ACTIVE | Noted: 2022-01-01

## 2022-10-20 PROBLEM — F41.9 ANXIETY AND DEPRESSION: Status: ACTIVE | Noted: 2022-01-01

## 2022-10-20 PROBLEM — E43 SEVERE PROTEIN-CALORIE MALNUTRITION (HCC): Status: ACTIVE | Noted: 2022-01-01

## 2022-10-20 PROBLEM — E78.5 HYPERLIPIDEMIA: Status: ACTIVE | Noted: 2022-01-01

## 2022-10-20 PROBLEM — F32.A ANXIETY AND DEPRESSION: Status: ACTIVE | Noted: 2022-01-01

## 2022-10-20 PROBLEM — D53.9 MACROCYTIC ANEMIA: Status: ACTIVE | Noted: 2022-01-01

## 2022-10-20 PROBLEM — R17 ELEVATED BILIRUBIN: Status: ACTIVE | Noted: 2022-01-01

## 2022-10-20 PROBLEM — J96.01 ACUTE RESPIRATORY FAILURE WITH HYPOXIA (HCC): Status: ACTIVE | Noted: 2022-01-01

## 2022-10-20 PROBLEM — R74.01 TRANSAMINITIS: Status: ACTIVE | Noted: 2022-01-01

## 2022-10-20 PROBLEM — R74.8 ELEVATED ALKALINE PHOSPHATASE LEVEL: Status: ACTIVE | Noted: 2022-01-01

## (undated) DEVICE — FILM CASSETTE ENDO

## (undated) DEVICE — WATER IRRIGATION STERILE 1000ML (12EA/CA)

## (undated) DEVICE — SYRINGE 10 ML CONTROL LL (25EA/BX 4BX/CA)

## (undated) DEVICE — SYRINGE SAFETY 10 ML 18 GA X 1 1/2 BLUNT LL (100/BX 4BX/CA)

## (undated) DEVICE — DRESSING TRANSPARENT FILM TEGADERM 4 X 4.75" (50EA/BX)"

## (undated) DEVICE — EXTRACTOR PRO XL 9-12 MM ABOVE

## (undated) DEVICE — SENSOR SPO2 ADULT LNCS ADTX (20/BX) ORDER ITEM #19593

## (undated) DEVICE — SLEEVE, VASO, THIGH, MED

## (undated) DEVICE — SENSOR SPO2 NEO LNCS ADHESIVE (20/BX) SEE USER NOTES

## (undated) DEVICE — DRAPE LAPAROTOMY T SHEET - (12EA/CA)

## (undated) DEVICE — CANISTER SUCTION 3000ML MECHANICAL FILTER AUTO SHUTOFF MEDI-VAC NONSTERILE LF DISP  (40EA/CA)

## (undated) DEVICE — KIT CUSTOM PROCEDURE SINGLE FOR ENDO  (15/CA)

## (undated) DEVICE — SET EXTENSION WITH 2 PORTS (48EA/CA) ***PART #2C8610 IS A SUBSTITUTE*****

## (undated) DEVICE — CANNULA W/ SUPPLY TUBING O2 - (50/CA)

## (undated) DEVICE — SODIUM CHL. INJ. 0.9% 500ML (24EA/CA 50CA/PF)

## (undated) DEVICE — ELECTRODE DUAL RETURN W/ CORD - (50/PK)

## (undated) DEVICE — SYRINGE DISP. 60 CC LL - (30/BX, 12BX/CA)**WHEN THESE ARE GONE ORDER #500206**

## (undated) DEVICE — KIT ANESTHESIA W/CIRCUIT & 3/LT BAG W/FILTER (20EA/CA)

## (undated) DEVICE — CATHETER IV SAFETY 20 GA X 1-1/4 (50/BX)

## (undated) DEVICE — CAPTIVATOR II-15MM ROUND STIFF (40/BX)

## (undated) DEVICE — SYRINGE 30 ML LL (56/BX)

## (undated) DEVICE — TUBE CONNECTING SUCTION - CLEAR PLASTIC STERILE 72 IN (50EA/CA)

## (undated) DEVICE — CLOSURE SKIN STRIP 1/2 X 4 IN - (STERI STRIP) (50/BX 4BX/CA)

## (undated) DEVICE — ELECTRODE 850 FOAM ADHESIVE - HYDROGEL RADIOTRNSPRNT (50/PK)

## (undated) DEVICE — GOWN SURGEONS LARGE - (32/CA)

## (undated) DEVICE — HEAD HOLDER JUNIOR/ADULT

## (undated) DEVICE — CANISTER SUCTION RIGID RED 1500CC (40EA/CA)

## (undated) DEVICE — LACTATED RINGERS INJ 1000 ML - (14EA/CA 60CA/PF)

## (undated) DEVICE — SUTURE 3-0 VICRYL PLUS SH - 8X 18 INCH (12/BX)

## (undated) DEVICE — SYRINGE SAFETY 3 ML 18 GA X 1 1/2 BLUNT LL (100/BX 8BX/CA)

## (undated) DEVICE — GLOVE BIOGEL ECLIPSE PF LATEX SIZE 7.5

## (undated) DEVICE — NEPTUNE 4 PORT MANIFOLD - (20/PK)

## (undated) DEVICE — TUBING O2 7FT TIP SMTH BORE - (50/CA)

## (undated) DEVICE — SYRINGE SAFETY 5 ML 18 GA X 1-1/2 BLUNT LL (100/BX 4BX/CA)

## (undated) DEVICE — DRAPE C-ARM LARGE 41IN X 74 IN - (10/BX 2BX/CA)

## (undated) DEVICE — BITE BLOCK ADULT 60FR (100EA/CA)

## (undated) DEVICE — TUBE SUCTION YANKAUER  1/4 X 6FT (20EA/CA)"

## (undated) DEVICE — SET LEADWIRE 5 LEAD BEDSIDE DISPOSABLE ECG (1SET OF 5/EA)

## (undated) DEVICE — KIT  I.V. START (100EA/CA)

## (undated) DEVICE — SUTURE GENERAL

## (undated) DEVICE — SPONGE GAUZE NON-STERILE 4X4 - (2000/CA 10PK/CA)

## (undated) DEVICE — SPONGE GAUZESTER. 2X2 4-PL - (2/PK 50PK/BX 30BX/CS)

## (undated) DEVICE — SODIUM CHL IRRIGATION 0.9% 1000ML (12EA/CA)

## (undated) DEVICE — PACK MINOR BASIN - (2EA/CA)

## (undated) DEVICE — GRASPER

## (undated) DEVICE — GUIDE JAGWIRE 035 STRAIGHT (2EA/BX)

## (undated) DEVICE — GOWN WARMING STANDARD FLEX - (30/CA)

## (undated) DEVICE — TUBING CLEARLINK DUO-VENT - C-FLO (48EA/CA)

## (undated) DEVICE — GLOVE BIOGEL SZ 8 SURGICAL PF LTX - (50PR/BX 4BX/CA)

## (undated) DEVICE — GOWN SURGICAL X-LARGE ULTRA - FILM-REINFORCED (20/CA)

## (undated) DEVICE — BLADE SURGICAL #11 - (50/BX)

## (undated) DEVICE — DRAPE IOBAN II INCISE 23X17 - (10EA/BX 4BX/CA)

## (undated) DEVICE — CHLORAPREP 26 ML APPLICATOR - ORANGE TINT(25/CA)

## (undated) DEVICE — FORCEP RADIAL JAW 4 STANDARD CAPACITY W/NEEDLE 240CM (40EA/BX)

## (undated) DEVICE — DECANTER FLD BLS - (50/CA)

## (undated) DEVICE — MASK WITH FACE SHIELD (25/BX 4BX/CA)

## (undated) DEVICE — MASK ANESTHESIA ADULT  - (100/CA)

## (undated) DEVICE — PROTECTOR ULNA NERVE - (36PR/CA)

## (undated) DEVICE — GLOVE, LITE (PAIR)

## (undated) DEVICE — SUCTION INSTRUMENT YANKAUER BULBOUS TIP W/O VENT (50EA/CA)